# Patient Record
Sex: FEMALE | Race: WHITE | NOT HISPANIC OR LATINO | Employment: OTHER | URBAN - NONMETROPOLITAN AREA
[De-identification: names, ages, dates, MRNs, and addresses within clinical notes are randomized per-mention and may not be internally consistent; named-entity substitution may affect disease eponyms.]

---

## 2017-02-20 DIAGNOSIS — E03.9 HYPOTHYROIDISM: ICD-10-CM

## 2017-02-20 RX ORDER — LEVOTHYROXINE SODIUM 100 UG/1
TABLET ORAL
Qty: 90 TABLET | Refills: 1 | Status: SHIPPED | OUTPATIENT
Start: 2017-02-20 | End: 2017-08-22

## 2017-06-22 DIAGNOSIS — Z12.31 VISIT FOR SCREENING MAMMOGRAM: Primary | ICD-10-CM

## 2017-07-11 ENCOUNTER — TRANSFERRED RECORDS (OUTPATIENT)
Dept: HEALTH INFORMATION MANAGEMENT | Facility: HOSPITAL | Age: 67
End: 2017-07-11

## 2017-08-01 ENCOUNTER — OFFICE VISIT (OUTPATIENT)
Dept: FAMILY MEDICINE | Facility: OTHER | Age: 67
End: 2017-08-01
Attending: FAMILY MEDICINE
Payer: MEDICARE

## 2017-08-01 VITALS
RESPIRATION RATE: 16 BRPM | DIASTOLIC BLOOD PRESSURE: 80 MMHG | WEIGHT: 200 LBS | SYSTOLIC BLOOD PRESSURE: 128 MMHG | HEIGHT: 65 IN | OXYGEN SATURATION: 98 % | HEART RATE: 64 BPM | TEMPERATURE: 97.2 F | BODY MASS INDEX: 33.32 KG/M2

## 2017-08-01 DIAGNOSIS — E11.9 TYPE 2 DIABETES MELLITUS WITHOUT COMPLICATION, WITHOUT LONG-TERM CURRENT USE OF INSULIN (H): Primary | ICD-10-CM

## 2017-08-01 DIAGNOSIS — Z11.59 NEED FOR HEPATITIS C SCREENING TEST: ICD-10-CM

## 2017-08-01 DIAGNOSIS — I10 ESSENTIAL HYPERTENSION: ICD-10-CM

## 2017-08-01 DIAGNOSIS — Z78.0 POSTMENOPAUSAL STATUS: ICD-10-CM

## 2017-08-01 DIAGNOSIS — K59.00 CONSTIPATION, UNSPECIFIED CONSTIPATION TYPE: ICD-10-CM

## 2017-08-01 DIAGNOSIS — E03.9 HYPOTHYROIDISM, UNSPECIFIED TYPE: ICD-10-CM

## 2017-08-01 LAB
ANION GAP SERPL CALCULATED.3IONS-SCNC: 5 MMOL/L (ref 3–14)
BUN SERPL-MCNC: 17 MG/DL (ref 7–30)
CALCIUM SERPL-MCNC: 9.7 MG/DL (ref 8.5–10.1)
CHLORIDE SERPL-SCNC: 104 MMOL/L (ref 94–109)
CHOLEST SERPL-MCNC: 188 MG/DL
CO2 SERPL-SCNC: 29 MMOL/L (ref 20–32)
CREAT SERPL-MCNC: 0.76 MG/DL (ref 0.52–1.04)
CREAT UR-MCNC: 110 MG/DL
EST. AVERAGE GLUCOSE BLD GHB EST-MCNC: 148 MG/DL
GFR SERPL CREATININE-BSD FRML MDRD: 76 ML/MIN/1.7M2
GLUCOSE SERPL-MCNC: 127 MG/DL (ref 70–99)
HBA1C MFR BLD: 6.8 % (ref 4.3–6)
HDLC SERPL-MCNC: 33 MG/DL
LDLC SERPL CALC-MCNC: 108 MG/DL
MICROALBUMIN UR-MCNC: 7 MG/L
MICROALBUMIN/CREAT UR: 6.36 MG/G CR (ref 0–25)
NONHDLC SERPL-MCNC: 155 MG/DL
POTASSIUM SERPL-SCNC: 4.1 MMOL/L (ref 3.4–5.3)
SODIUM SERPL-SCNC: 138 MMOL/L (ref 133–144)
TRIGL SERPL-MCNC: 234 MG/DL
TSH SERPL DL<=0.05 MIU/L-ACNC: 0.3 MU/L (ref 0.4–4)

## 2017-08-01 PROCEDURE — 82043 UR ALBUMIN QUANTITATIVE: CPT | Mod: ZL | Performed by: FAMILY MEDICINE

## 2017-08-01 PROCEDURE — 36415 COLL VENOUS BLD VENIPUNCTURE: CPT | Mod: ZL | Performed by: FAMILY MEDICINE

## 2017-08-01 PROCEDURE — 80048 BASIC METABOLIC PNL TOTAL CA: CPT | Mod: ZL | Performed by: FAMILY MEDICINE

## 2017-08-01 PROCEDURE — G0009 ADMIN PNEUMOCOCCAL VACCINE: HCPCS | Performed by: FAMILY MEDICINE

## 2017-08-01 PROCEDURE — G0472 HEP C SCREEN HIGH RISK/OTHER: HCPCS | Mod: ZL | Performed by: FAMILY MEDICINE

## 2017-08-01 PROCEDURE — 40000788 ZZHCL STATISTIC ESTIMATED AVERAGE GLUCOSE: Mod: ZL | Performed by: FAMILY MEDICINE

## 2017-08-01 PROCEDURE — 80061 LIPID PANEL: CPT | Mod: ZL | Performed by: FAMILY MEDICINE

## 2017-08-01 PROCEDURE — 83036 HEMOGLOBIN GLYCOSYLATED A1C: CPT | Mod: ZL | Performed by: FAMILY MEDICINE

## 2017-08-01 PROCEDURE — 90732 PPSV23 VACC 2 YRS+ SUBQ/IM: CPT | Performed by: FAMILY MEDICINE

## 2017-08-01 PROCEDURE — 99214 OFFICE O/P EST MOD 30 MIN: CPT | Performed by: FAMILY MEDICINE

## 2017-08-01 PROCEDURE — 84443 ASSAY THYROID STIM HORMONE: CPT | Mod: ZL | Performed by: FAMILY MEDICINE

## 2017-08-01 PROCEDURE — 99212 OFFICE O/P EST SF 10 MIN: CPT | Mod: 25

## 2017-08-01 RX ORDER — POLYETHYLENE GLYCOL 3350 17 G/17G
1 POWDER, FOR SOLUTION ORAL DAILY
Qty: 510 G | Refills: 3 | Status: SHIPPED | OUTPATIENT
Start: 2017-08-01 | End: 2018-02-19

## 2017-08-01 ASSESSMENT — ANXIETY QUESTIONNAIRES
6. BECOMING EASILY ANNOYED OR IRRITABLE: MORE THAN HALF THE DAYS
5. BEING SO RESTLESS THAT IT IS HARD TO SIT STILL: NOT AT ALL
3. WORRYING TOO MUCH ABOUT DIFFERENT THINGS: NOT AT ALL
1. FEELING NERVOUS, ANXIOUS, OR ON EDGE: SEVERAL DAYS
4. TROUBLE RELAXING: NOT AT ALL
7. FEELING AFRAID AS IF SOMETHING AWFUL MIGHT HAPPEN: NOT AT ALL
IF YOU CHECKED OFF ANY PROBLEMS ON THIS QUESTIONNAIRE, HOW DIFFICULT HAVE THESE PROBLEMS MADE IT FOR YOU TO DO YOUR WORK, TAKE CARE OF THINGS AT HOME, OR GET ALONG WITH OTHER PEOPLE: NOT DIFFICULT AT ALL
GAD7 TOTAL SCORE: 3
2. NOT BEING ABLE TO STOP OR CONTROL WORRYING: NOT AT ALL

## 2017-08-01 NOTE — MR AVS SNAPSHOT
After Visit Summary   8/1/2017    Kayla Lugo    MRN: [id number]           Patient Information     Date Of Birth          1950        Visit Information        Provider Department      8/1/2017 9:00 AM Natalia Starr MD [facility]        Today's Diagnoses     Type 2 diabetes mellitus without complication, without long-term current use of insulin (H)    -  1    Essential hypertension        Hypothyroidism, unspecified type        Need for hepatitis C screening test        Postmenopausal status        Constipation, unspecified constipation type           Follow-ups after your visit        Follow-up notes from your care team     Return in about 6 months (around 2/1/2018).      Who to contact     If you have questions or need follow up information about today's clinic visit or your schedule please contact [facility] directly at [phone].  Normal or non-critical lab and imaging results will be communicated to you by MyChart, letter or phone within 4 business days after the clinic has received the results. If you do not hear from us within 7 days, please contact the clinic through [employer] or phone. If you have a critical or abnormal lab result, we will notify you by phone as soon as possible.  Submit refill requests through [employer] or call your pharmacy and they will forward the refill request to us. Please allow 3 business days for your refill to be completed.          Additional Information About Your Visit        MyChart Information     [employer] gives you secure access to your electronic health record. If you see a primary care provider, you can also send messages to your care team and make appointments. If you have questions, please call your primary care clinic.  If you do not have a primary care provider, please call [phone] and they will assist you.        Care EveryWhere ID     This is your Care EveryWhere ID. This could be used by other organizations  "to access your [facility] medical records  [id number]        Your Vitals Were     Pulse Temperature Respirations Height Pulse Oximetry BMI (Body Mass Index)    64 97.2  F (36.2  C) (Tympanic) 16 5' 4.75\" (1.645 m) 98% 33.54 kg/m2       Blood Pressure from Last 3 Encounters:   08/01/17 128/80   07/29/16 134/78   12/01/15 135/80    Weight from Last 3 Encounters:   08/01/17 200 lb (90.7 kg)   07/29/16 200 lb (90.7 kg)   12/01/15 211 lb 1.6 oz (95.8 kg)              We Performed the Following     Albumin Random Urine Quantitative     Basic metabolic panel     C FOOT EXAM  NO CHARGE     HEMOGLOBIN A1C     Hepatitis C Screen Reflex to HCV RNA Quant and Genotype     Lipid Profile     TSH     VACCINE ADMINISTRATION, INITIAL          Today's Medication Changes          These changes are accurate as of: 8/1/17 10:24 AM.  If you have any questions, ask your nurse or doctor.               Start taking these medicines.        Dose/Directions    polyethylene glycol powder   Commonly known as:  MIRALAX   Used for:  Constipation, unspecified constipation type   Started by:  Natalia Starr MD        Dose:  1 capful   Take 17 g (1 capful) by mouth daily   Quantity:  510 g   Refills:  3            Where to get your medicines      These medications were sent to [facility] [address] [place] [address]  AT [facility] [address]  [address] [place] [address]     Phone:  [phone]     polyethylene glycol powder                Primary Care Provider Office Phone # Fax #    Natalia Starr [phone]       [facility] [address] [place] [address]        Equal Access to Services     HUMPHREY ANTUNEZ AH: Hadii aad ku hadasho Soomaali, waaxda luqadaha, qaybta kaalmada adeegyada, susana kennedyn trevor renner. So [facility] [phone].    ATENCIÓN: Si habla español, tiene a cordero disposición servicios gratuitos de asistencia lingüística. Llame al " [phone].    We comply with applicable federal civil rights laws and [place] laws. We do not discriminate on the basis of race, color, national origin, age, disability sex, sexual orientation or gender identity.            Thank you!     Thank you for choosing [facility]  for your care. Our goal is always to provide you with excellent care. Hearing back from our patients is one way we can continue to improve our services. Please take a few minutes to complete the written survey that you may receive in the mail after your visit with us. Thank you!             Your Updated Medication List - Protect others around you: Learn how to safely use, store and throw away your medicines at www.disposemymeds.org.          This list is accurate as of: 8/1/17 10:24 AM.  Always use your most recent med list.                   Brand Name Dispense Instructions for use Diagnosis    ACCU-CHEK SMARTVIEW test strip   Generic drug:  blood glucose monitoring     100 each    use to test 3 times daily or as directed    Diabetes mellitus, type 2 (H)       blood glucose monitoring lancets     102 Box    use to test  blood sugar 3 times daily or as directed    Diabetes mellitus, type 2 (H)       cholecalciferol 1000 UNIT tablet    vitamin D     Take 4 tablets by mouth daily.        fish oil-omega-3 fatty acids 1000 MG capsule      Take 3 capsules by mouth daily.        ibuprofen 600 MG tablet    ADVIL/MOTRIN    90 tablet    Take 1 tablet (600 mg) by mouth every 6 hours as needed for moderate pain    Back pain       levothyroxine 100 MCG tablet    SYNTHROID/LEVOTHROID    90 tablet    TAKE 1 TABLET BY MOUTH EVERY DAY    Hypothyroidism       polyethylene glycol powder    MIRALAX    510 g    Take 17 g (1 capful) by mouth daily    Constipation, unspecified constipation type

## 2017-08-01 NOTE — PROGRESS NOTES
SUBJECTIVE:                                                    Kayla Lugo is a 67 year old female who presents to clinic today for the following health issues:    Diabetes Follow-up      Patient is checking blood sugars: not at all    Diabetic concerns: None     Symptoms of hypoglycemia (low blood sugar): no lows     Paresthesias (numbness or burning in feet) or sores: No     Date of last diabetic eye exam: 11/2016    Hypertension Follow-up      Outpatient blood pressures are not being checked.    Low Salt Diet: no added salt    Hypothyroidism Follow-up      Since last visit, patient describes the following symptoms: Weight stable, no hair loss, no skin changes, no constipation, no loose stools    Patient is due for Hepatitis C screening.  She is also due for DEXA scan.  She would like refill of Miralax, originally prescribed by GI.      Problem list and histories reviewed & adjusted, as indicated.  Additional history: as documented    Patient Active Problem List   Diagnosis     Essential hypertension     Hypothyroidism     Esophageal reflux     Osteoarthrosis, unspecified whether generalized or localized, involving lower leg     Eczema     Hearing loss     Advanced care planning/counseling discussion     Skin lesion of face     Basal cell carcinoma of upper lip     S/P excision of skin lesion, follow-up exam     Diabetes mellitus, type 2 (H)     History of labyrinthitis     Past Surgical History:   Procedure Laterality Date     ARTHROSCOPY KNEE  2001    RT      COLONOSCOPY  2004     COLONOSCOPY  6/13/12    Dr. Gorman; 5 year recall given     COLONOSCOPY  07/11/2017     colonoscopy with polypectomy  2007    repeat in five      DILATION AND CURETTAGE  1996     EXCISE LESION LIP Right 9/16/2014    Procedure: EXCISE LESION LIP;  Surgeon: Bri Flores MD;  Location: [facility]     ORTHOPEDIC SURGERY Right 10-5-2015    right shoulder arthoscopy [facility]     NADIRA with BSO  1997       Social History   Substance Use  "Topics     Smoking status: Never Smoker     Smokeless tobacco: Never Used     Alcohol use No     Family History   Problem Relation Age of Onset     Alcohol/Drug Father      alcoholism     C.A.D. Father      (cause of death)      CANCER Mother      basal cell     Ovarian Cancer Mother      Alcohol/Drug Son      alcoholism      C.A.D. Son      DIABETES Brother      DIABETES Maternal Grandmother          Current Outpatient Prescriptions   Medication Sig Dispense Refill     polyethylene glycol (MIRALAX) powder Take 17 g (1 capful) by mouth daily 510 g 3     levothyroxine (SYNTHROID/LEVOTHROID) 100 MCG tablet TAKE 1 TABLET BY MOUTH EVERY DAY 90 tablet 1     ACCU-CHEK SMARTVIEW test strip use to test 3 times daily or as directed (Patient not taking: Reported on 8/1/2017) 100 each 10     blood glucose monitoring (ACCU-CHEK FASTCLIX) lancets use to test  blood sugar 3 times daily or as directed (Patient not taking: Reported on 8/1/2017) 102 Box 10     ibuprofen (ADVIL,MOTRIN) 600 MG tablet Take 1 tablet (600 mg) by mouth every 6 hours as needed for moderate pain (Patient not taking: Reported on 8/1/2017) 90 tablet 5     cholecalciferol (VITAMIN D3) 1000 UNIT tablet Take 4 tablets by mouth daily.       fish oil-omega-3 fatty acids (FISH OIL) 1000 MG capsule Take 3 capsules by mouth daily.       Allergies   Allergen Reactions     Hydrochlorothiazide Rash     Atenolol Other (See Comments)     Bradycardia      Lisinopril Cough         Reviewed and updated as needed this visit by clinical staffTobacco  Allergies  Meds  Problems  Med Hx  Surg Hx  Fam Hx  Soc Hx        Reviewed and updated as needed this visit by Provider         ROS:  Constitutional, HEENT, cardiovascular, pulmonary, gi and gu systems are negative, except as otherwise noted.      OBJECTIVE:   /80 (BP Location: Left arm, Patient Position: Sitting, Cuff Size: Adult Regular)  Pulse 64  Temp 97.2  F (36.2  C) (Tympanic)  Resp 16  Ht 5' 4.75\" (1.645 " m)  Wt 200 lb (90.7 kg)  SpO2 98%  BMI 33.54 kg/m2  Body mass index is 33.54 kg/(m^2).  GENERAL: healthy, alert and no distress  EYES: Eyes grossly normal to inspection, PERRL and conjunctivae and sclerae normal  HENT: ear canals and TM's normal, nose and mouth without ulcers or lesions  NECK: no adenopathy  RESP: lungs clear to auscultation - no rales, rhonchi or wheezes  BREAST: normal without masses, tenderness or nipple discharge and no palpable axillary masses or adenopathy  CV: regular rate and rhythm, normal S1 S2, no S3 or S4, no murmur, click or rub, no peripheral edema and peripheral pulses strong  ABDOMEN: soft, nontender, no hepatosplenomegaly, no masses and bowel sounds normal  MS: no gross musculoskeletal defects noted, no edema  SKIN: no suspicious lesions or rashes  NEURO: Normal strength and tone, mentation intact and speech normal  PSYCH: mentation appears normal, affect normal/bright    Diagnostic Test Results:  none     ASSESSMENT/PLAN:     Diabetes Type II, A1c Controlled, non-insulin dependent   Associated with the following complications    Renal Complications:  None    Ophthalmologic Complications: None    Neurologic Complications: None    Peripheral Vascular Complications:  None    Other: None   Plan:  Labs:  See orders    Hypertension; controlled   Associated with the following complications:    Diabetes   Plan:  Labs:   See orders    Hypothyroidism; controlled/euthyroid   Plan:  Labs:  See orders              ICD-10-CM    1. Type 2 diabetes mellitus without complication, without long-term current use of insulin (H) E11.9 C FOOT EXAM  NO CHARGE     VACCINE ADMINISTRATION, INITIAL     HEMOGLOBIN A1C     Lipid Profile     Albumin Random Urine Quantitative   2. Essential hypertension I10 Basic metabolic panel   3. Hypothyroidism, unspecified type E03.9 TSH   4. Need for hepatitis C screening test Z11.59 Hepatitis C Screen Reflex to HCV RNA Quant and Genotype   5. Postmenopausal status Z78.0  DX Hip/Pelvis/Spine     DX Hip/Pelvis/Spine   6. Constipation, unspecified constipation type K59.00 polyethylene glycol (MIRALAX) powder       FUTURE APPOINTMENTS:       - Follow-up visit in 6 months, sooner as needed.      Natalia Starr MD  [facility]

## 2017-08-01 NOTE — NURSING NOTE
"Chief Complaint   Patient presents with     Physical      Due for breast exam, and questions on her recent colonoscopy     Thyroid Problem     lab results and medication refills     Diabetes     borderline diabetic, due for labwork and foot exam       Initial /80 (BP Location: Left arm, Patient Position: Sitting, Cuff Size: Adult Regular)  Pulse 64  Temp 97.2  F (36.2  C) (Tympanic)  Resp 16  Ht 5' 4.75\" (1.645 m)  Wt 200 lb (90.7 kg)  SpO2 98%  BMI 33.54 kg/m2 Estimated body mass index is 33.54 kg/(m^2) as calculated from the following:    Height as of this encounter: 5' 4.75\" (1.645 m).    Weight as of this encounter: 200 lb (90.7 kg).  Medication Reconciliation: complete     Anamaria Grover      "

## 2017-08-02 LAB — HCV AB SERPL QL IA: NORMAL

## 2017-08-02 ASSESSMENT — PATIENT HEALTH QUESTIONNAIRE - PHQ9: SUM OF ALL RESPONSES TO PHQ QUESTIONS 1-9: 0

## 2017-08-02 ASSESSMENT — ANXIETY QUESTIONNAIRES: GAD7 TOTAL SCORE: 3

## 2017-08-04 DIAGNOSIS — E78.5 HYPERLIPIDEMIA, UNSPECIFIED HYPERLIPIDEMIA TYPE: ICD-10-CM

## 2017-08-04 DIAGNOSIS — E11.9 TYPE 2 DIABETES MELLITUS WITHOUT COMPLICATION, WITHOUT LONG-TERM CURRENT USE OF INSULIN (H): Primary | ICD-10-CM

## 2017-08-04 RX ORDER — ATORVASTATIN CALCIUM 10 MG/1
10 TABLET, FILM COATED ORAL DAILY
Qty: 30 TABLET | Refills: 2 | Status: SHIPPED | OUTPATIENT
Start: 2017-08-04 | End: 2017-11-09

## 2017-08-04 RX ORDER — METFORMIN HCL 500 MG
500 TABLET, EXTENDED RELEASE 24 HR ORAL
Qty: 30 TABLET | Refills: 2 | Status: SHIPPED | OUTPATIENT
Start: 2017-08-04 | End: 2017-11-09

## 2017-08-10 ENCOUNTER — HOSPITAL ENCOUNTER (OUTPATIENT)
Dept: GENERAL RADIOLOGY | Facility: HOSPITAL | Age: 67
Discharge: HOME | End: 2017-08-10
Attending: FAMILY MEDICINE | Admitting: FAMILY MEDICINE
Payer: MEDICARE

## 2017-08-10 PROCEDURE — 77080 DXA BONE DENSITY AXIAL: CPT | Mod: TC

## 2017-08-22 ENCOUNTER — MYC MEDICAL ADVICE (OUTPATIENT)
Dept: FAMILY MEDICINE | Facility: OTHER | Age: 67
End: 2017-08-22

## 2017-08-22 ENCOUNTER — TELEPHONE (OUTPATIENT)
Dept: FAMILY MEDICINE | Facility: OTHER | Age: 67
End: 2017-08-22

## 2017-08-22 DIAGNOSIS — E03.9 HYPOTHYROIDISM, UNSPECIFIED TYPE: ICD-10-CM

## 2017-08-22 RX ORDER — LEVOTHYROXINE SODIUM 100 UG/1
100 TABLET ORAL DAILY
Qty: 90 TABLET | Refills: 0 | Status: SHIPPED | OUTPATIENT
Start: 2017-08-22 | End: 2017-11-17

## 2017-08-22 NOTE — TELEPHONE ENCOUNTER
Reason for call:  Medication    1. Medication Name? Synthroid  2. Is this request for a refill?  Yes  3. What Pharmacy do you use?  [facility]  4. Have you contacted your pharmacy?  Yes  5. If yes, when?  (Please note that the turn-around-time for prescriptions is 72 business hours; I am sending your request at this time. SEND TO  [facility]  )  Description:    Kayla is completely out of this medication  Was an appointment offered for this a call?  No  Preferred method for responding to this message   [phone]  If we cannot reach you directly, may we leave a detailed response at the number you provided? Yes

## 2017-10-18 ENCOUNTER — ALLIED HEALTH/NURSE VISIT (OUTPATIENT)
Dept: FAMILY MEDICINE | Facility: OTHER | Age: 67
End: 2017-10-18
Attending: FAMILY MEDICINE
Payer: MEDICARE

## 2017-10-18 DIAGNOSIS — Z23 NEED FOR PROPHYLACTIC VACCINATION AND INOCULATION AGAINST INFLUENZA: Primary | ICD-10-CM

## 2017-10-18 PROCEDURE — 90662 IIV NO PRSV INCREASED AG IM: CPT

## 2017-10-18 PROCEDURE — G0008 ADMIN INFLUENZA VIRUS VAC: HCPCS

## 2017-10-18 NOTE — PROGRESS NOTES
Injectable Influenza Immunization Documentation    1.  Is the person to be vaccinated sick today?   No    2. Does the person to be vaccinated have an allergy to a component   of the vaccine?   No    3. Has the person to be vaccinated ever had a serious reaction   to influenza vaccine in the past?   No    4. Has the person to be vaccinated ever had Guillain-Barré syndrome?   No    Form completed by Kavya Dyson

## 2017-11-07 ENCOUNTER — TRANSFERRED RECORDS (OUTPATIENT)
Dept: HEALTH INFORMATION MANAGEMENT | Facility: HOSPITAL | Age: 67
End: 2017-11-07

## 2017-11-09 DIAGNOSIS — E11.9 TYPE 2 DIABETES MELLITUS WITHOUT COMPLICATION, WITHOUT LONG-TERM CURRENT USE OF INSULIN (H): ICD-10-CM

## 2017-11-09 DIAGNOSIS — E78.5 HYPERLIPIDEMIA, UNSPECIFIED HYPERLIPIDEMIA TYPE: ICD-10-CM

## 2017-11-10 DIAGNOSIS — E11.9 TYPE 2 DIABETES MELLITUS WITHOUT COMPLICATION, WITHOUT LONG-TERM CURRENT USE OF INSULIN (H): ICD-10-CM

## 2017-11-10 RX ORDER — ATORVASTATIN CALCIUM 10 MG/1
TABLET, FILM COATED ORAL
Qty: 30 TABLET | Refills: 0 | Status: SHIPPED | OUTPATIENT
Start: 2017-11-10 | End: 2017-11-20

## 2017-11-10 RX ORDER — METFORMIN HCL 500 MG
TABLET, EXTENDED RELEASE 24 HR ORAL
Qty: 30 TABLET | Refills: 0 | Status: SHIPPED | OUTPATIENT
Start: 2017-11-10 | End: 2017-11-10

## 2017-11-10 RX ORDER — METFORMIN HCL 500 MG
TABLET, EXTENDED RELEASE 24 HR ORAL
Qty: 30 TABLET | Refills: 0 | Status: SHIPPED | OUTPATIENT
Start: 2017-11-10 | End: 2017-11-20

## 2017-11-13 NOTE — TELEPHONE ENCOUNTER
Metformin      Last Written Prescription Date: 11/10/17  Last Fill Quantity: 30,  # refills: 0   Last Office Visit with G, P or [facility] prescribing provider: 8/1/17

## 2017-11-14 RX ORDER — METFORMIN HCL 500 MG
TABLET, EXTENDED RELEASE 24 HR ORAL
Qty: 90 TABLET | Refills: 1 | Status: SHIPPED | OUTPATIENT
Start: 2017-11-14 | End: 2018-05-21

## 2017-11-17 DIAGNOSIS — E03.9 HYPOTHYROIDISM, UNSPECIFIED TYPE: ICD-10-CM

## 2017-11-17 RX ORDER — LEVOTHYROXINE SODIUM 100 UG/1
TABLET ORAL
Qty: 90 TABLET | Refills: 0 | Status: SHIPPED | OUTPATIENT
Start: 2017-11-17 | End: 2018-02-19

## 2017-11-17 NOTE — TELEPHONE ENCOUNTER
LOV and lab was done 8/2017.    TSH was 0.3 with no recommended change to his dose.  She was to fu in 3 months, and this has been scheduled by the patient.  Synthroid was therefore refilled.  Angle Paiz

## 2017-11-20 ENCOUNTER — OFFICE VISIT (OUTPATIENT)
Dept: FAMILY MEDICINE | Facility: OTHER | Age: 67
End: 2017-11-20
Attending: FAMILY MEDICINE
Payer: MEDICARE

## 2017-11-20 VITALS
TEMPERATURE: 97.9 F | HEART RATE: 70 BPM | HEIGHT: 65 IN | SYSTOLIC BLOOD PRESSURE: 140 MMHG | OXYGEN SATURATION: 98 % | BODY MASS INDEX: 31.99 KG/M2 | DIASTOLIC BLOOD PRESSURE: 86 MMHG | WEIGHT: 192 LBS | RESPIRATION RATE: 15 BRPM

## 2017-11-20 DIAGNOSIS — E11.9 TYPE 2 DIABETES MELLITUS WITHOUT COMPLICATION, WITHOUT LONG-TERM CURRENT USE OF INSULIN (H): Primary | ICD-10-CM

## 2017-11-20 DIAGNOSIS — I10 ESSENTIAL HYPERTENSION: ICD-10-CM

## 2017-11-20 DIAGNOSIS — E78.5 HYPERLIPIDEMIA, UNSPECIFIED HYPERLIPIDEMIA TYPE: ICD-10-CM

## 2017-11-20 LAB
ALT SERPL W P-5'-P-CCNC: 26 U/L (ref 0–50)
ANION GAP SERPL CALCULATED.3IONS-SCNC: 10 MMOL/L (ref 3–14)
BUN SERPL-MCNC: 12 MG/DL (ref 7–30)
CALCIUM SERPL-MCNC: 9.4 MG/DL (ref 8.5–10.1)
CHLORIDE SERPL-SCNC: 103 MMOL/L (ref 94–109)
CHOLEST SERPL-MCNC: 135 MG/DL
CO2 SERPL-SCNC: 26 MMOL/L (ref 20–32)
CREAT SERPL-MCNC: 0.77 MG/DL (ref 0.52–1.04)
EST. AVERAGE GLUCOSE BLD GHB EST-MCNC: 134 MG/DL
GFR SERPL CREATININE-BSD FRML MDRD: 75 ML/MIN/1.7M2
GLUCOSE SERPL-MCNC: 99 MG/DL (ref 70–99)
HBA1C MFR BLD: 6.3 % (ref 4.3–6)
HDLC SERPL-MCNC: 35 MG/DL
LDLC SERPL CALC-MCNC: 56 MG/DL
NONHDLC SERPL-MCNC: 100 MG/DL
POTASSIUM SERPL-SCNC: 4.4 MMOL/L (ref 3.4–5.3)
SODIUM SERPL-SCNC: 139 MMOL/L (ref 133–144)
TRIGL SERPL-MCNC: 220 MG/DL

## 2017-11-20 PROCEDURE — 80048 BASIC METABOLIC PNL TOTAL CA: CPT | Mod: ZL | Performed by: FAMILY MEDICINE

## 2017-11-20 PROCEDURE — 80061 LIPID PANEL: CPT | Mod: ZL | Performed by: FAMILY MEDICINE

## 2017-11-20 PROCEDURE — 99212 OFFICE O/P EST SF 10 MIN: CPT

## 2017-11-20 PROCEDURE — 99214 OFFICE O/P EST MOD 30 MIN: CPT | Performed by: FAMILY MEDICINE

## 2017-11-20 PROCEDURE — 83036 HEMOGLOBIN GLYCOSYLATED A1C: CPT | Mod: ZL | Performed by: FAMILY MEDICINE

## 2017-11-20 PROCEDURE — 40000788 ZZHCL STATISTIC ESTIMATED AVERAGE GLUCOSE: Mod: ZL | Performed by: FAMILY MEDICINE

## 2017-11-20 PROCEDURE — 84460 ALANINE AMINO (ALT) (SGPT): CPT | Mod: ZL | Performed by: FAMILY MEDICINE

## 2017-11-20 PROCEDURE — 36415 COLL VENOUS BLD VENIPUNCTURE: CPT | Mod: ZL | Performed by: FAMILY MEDICINE

## 2017-11-20 RX ORDER — ATORVASTATIN CALCIUM 10 MG/1
10 TABLET, FILM COATED ORAL DAILY
Qty: 90 TABLET | Refills: 3 | Status: SHIPPED | OUTPATIENT
Start: 2017-11-20 | End: 2018-11-12

## 2017-11-20 ASSESSMENT — PATIENT HEALTH QUESTIONNAIRE - PHQ9: SUM OF ALL RESPONSES TO PHQ QUESTIONS 1-9: 1

## 2017-11-20 ASSESSMENT — ANXIETY QUESTIONNAIRES
3. WORRYING TOO MUCH ABOUT DIFFERENT THINGS: NOT AT ALL
5. BEING SO RESTLESS THAT IT IS HARD TO SIT STILL: NOT AT ALL
7. FEELING AFRAID AS IF SOMETHING AWFUL MIGHT HAPPEN: NOT AT ALL
IF YOU CHECKED OFF ANY PROBLEMS ON THIS QUESTIONNAIRE, HOW DIFFICULT HAVE THESE PROBLEMS MADE IT FOR YOU TO DO YOUR WORK, TAKE CARE OF THINGS AT HOME, OR GET ALONG WITH OTHER PEOPLE: NOT DIFFICULT AT ALL
1. FEELING NERVOUS, ANXIOUS, OR ON EDGE: NOT AT ALL
4. TROUBLE RELAXING: NOT AT ALL
GAD7 TOTAL SCORE: 1
6. BECOMING EASILY ANNOYED OR IRRITABLE: SEVERAL DAYS
2. NOT BEING ABLE TO STOP OR CONTROL WORRYING: NOT AT ALL

## 2017-11-20 NOTE — NURSING NOTE
"Chief Complaint   Patient presents with     Recheck Medication     Lipids     Diabetes       Initial /86 (BP Location: Left arm, Patient Position: Sitting, Cuff Size: Adult Regular)  Pulse 70  Temp 97.9  F (36.6  C) (Tympanic)  Resp 15  Ht 5' 5\" (1.651 m)  Wt 192 lb (87.1 kg)  SpO2 98%  BMI 31.95 kg/m2 Estimated body mass index is 31.95 kg/(m^2) as calculated from the following:    Height as of this encounter: 5' 5\" (1.651 m).    Weight as of this encounter: 192 lb (87.1 kg).  Medication Reconciliation: complete    "

## 2017-11-20 NOTE — MR AVS SNAPSHOT
After Visit Summary   11/20/2017    Kayla Lugo    MRN: [id number]           Patient Information     Date Of Birth          1950        Visit Information        Provider Department      11/20/2017 10:15 AM Natalia Starr MD [facility]        Today's Diagnoses     Type 2 diabetes mellitus without complication, without long-term current use of insulin (H)    -  1    Hyperlipidemia, unspecified hyperlipidemia type        Essential hypertension           Follow-ups after your visit        Follow-up notes from your care team     Return in about 6 months (around 5/20/2018).      Who to contact     If you have questions or need follow up information about today's clinic visit or your schedule please contact [facility] directly at [phone].  Normal or non-critical lab and imaging results will be communicated to you by [employer] [employer], letter or phone within 4 business days after the clinic has received the results. If you do not hear from us within 7 days, please contact the clinic through [employer] or phone. If you have a critical or abnormal lab result, we will notify you by phone as soon as possible.  Submit refill requests through [employer] or call your pharmacy and they will forward the refill request to us. Please allow 3 business days for your refill to be completed.          Additional Information About Your Visit        MyChart Information     [employer] gives you secure access to your electronic health record. If you see a primary care provider, you can also send messages to your care team and make appointments. If you have questions, please call your primary care clinic.  If you do not have a primary care provider, please call [phone] and they will assist you.        Care EveryWhere ID     This is your Care EveryWhere ID. This could be used by other organizations to access your [facility] medical records  [id number]        Your Vitals Were     Pulse Temperature  "Respirations Height Pulse Oximetry BMI (Body Mass Index)    70 97.9  F (36.6  C) (Tympanic) 15 5' 5\" (1.651 m) 98% 31.95 kg/m2       Blood Pressure from Last 3 Encounters:   11/20/17 140/86   08/01/17 128/80   07/29/16 134/78    Weight from Last 3 Encounters:   11/20/17 192 lb (87.1 kg)   08/01/17 200 lb (90.7 kg)   07/29/16 200 lb (90.7 kg)              We Performed the Following     ALT     Basic metabolic panel     Hemoglobin A1c     Lipid Profile          Today's Medication Changes          These changes are accurate as of: 11/20/17  1:01 PM.  If you have any questions, ask your nurse or doctor.               These medicines have changed or have updated prescriptions.        Dose/Directions    atorvastatin 10 MG tablet   Commonly known as:  LIPITOR   This may have changed:  See the new instructions.   Used for:  Hyperlipidemia, unspecified hyperlipidemia type   Changed by:  Natalia Starr MD        Dose:  10 mg   Take 1 tablet (10 mg) by mouth daily   Quantity:  90 tablet   Refills:  3       metFORMIN 500 MG 24 hr tablet   Commonly known as:  GLUCOPHAGE-XR   This may have changed:  Another medication with the same name was removed. Continue taking this medication, and follow the directions you see here.   Used for:  Type 2 diabetes mellitus without complication, without long-term current use of insulin (H)   Changed by:  Natalia Starr MD        TAKE 1 TABLET(500 MG) BY MOUTH DAILY WITH DINNER   Quantity:  90 tablet   Refills:  1            Where to get your medicines      These medications were sent to [facility] [place], [place] [address] AT [facility] [address] [address]     Phone:  [phone]     atorvastatin 10 MG tablet                Primary Care Provider Office Phone # Fax #    Natalia Starr [phone] [address] [facility] [address]        Equal Access to Services     ALYSSA ANTUNEZ " AH: Megan mosesnickayde Maryellen, waaxda luqadaha, qaybta kakojo weiss, susana aria marciasowmya fournieryobalwinder renner. So [facility] [phone].    ATENCIÓN: Si habla español, tiene a cordero disposición servicios gratuitos de asistencia lingüística. Llame al [phone].    We comply with applicable federal civil rights laws and [place] laws. We do not discriminate on the basis of race, color, national origin, age, disability, sex, sexual orientation, or gender identity.            Thank you!     Thank you for choosing [facility]  for your care. Our goal is always to provide you with excellent care. Hearing back from our patients is one way we can continue to improve our services. Please take a few minutes to complete the written survey that you may receive in the mail after your visit with us. Thank you!             Your Updated Medication List - Protect others around you: Learn how to safely use, store and throw away your medicines at www.disposemymeds.org.          This list is accurate as of: 11/20/17  1:01 PM.  Always use your most recent med list.                   Brand Name Dispense Instructions for use Diagnosis    ACCU-CHEK SMARTVIEW test strip   Generic drug:  blood glucose monitoring     100 each    use to test 3 times daily or as directed    Diabetes mellitus, type 2 (H)       atorvastatin 10 MG tablet    LIPITOR    90 tablet    Take 1 tablet (10 mg) by mouth daily    Hyperlipidemia, unspecified hyperlipidemia type       blood glucose monitoring lancets     102 Box    use to test  blood sugar 3 times daily or as directed    Diabetes mellitus, type 2 (H)       cholecalciferol 1000 UNIT tablet    vitamin D3     Take 4 tablets by mouth daily.        fish oil-omega-3 fatty acids 1000 MG capsule      Take 3 capsules by mouth daily.        ibuprofen 600 MG tablet    ADVIL/MOTRIN    90 tablet    Take 1 tablet (600 mg) by mouth every 6 hours as needed for moderate pain    Back pain        levothyroxine 100 MCG tablet    SYNTHROID/LEVOTHROID    90 tablet    TAKE 1 TABLET(100 MCG) BY MOUTH DAILY    Hypothyroidism, unspecified type       metFORMIN 500 MG 24 hr tablet    GLUCOPHAGE-XR    90 tablet    TAKE 1 TABLET(500 MG) BY MOUTH DAILY WITH DINNER    Type 2 diabetes mellitus without complication, without long-term current use of insulin (H)       polyethylene glycol powder    MIRALAX    510 g    Take 17 g (1 capful) by mouth daily    Constipation, unspecified constipation type

## 2017-11-20 NOTE — PROGRESS NOTES
SUBJECTIVE:   Kayla Lugo is a 67 year old female who presents to clinic today for the following health issues:      Diabetes Follow-up      Patient is checking blood sugars: rarely.  Results range from 85 to 129  Morning readings    Diabetic concerns: None     Symptoms of hypoglycemia (low blood sugar): none     Paresthesias (numbness or burning in feet) or sores: No     Date of last diabetic eye exam: November 2017    Hyperlipidemia Follow-Up      Rate your low fat/cholesterol diet?: poor    Taking statin?  Yes, no muscle aches from statin    Other lipid medications/supplements?:  Fish oil/Omega 3, dose 1000 without side effects    Hypertension Follow-up      Outpatient blood pressures are not being checked.    Low Salt Diet: no added salt        Amount of exercise or physical activity: None    Problems taking medications regularly: No    Medication side effects: none  Diet: regular (no restrictions)        Problem list and histories reviewed & adjusted, as indicated.  Additional history: as documented    Patient Active Problem List   Diagnosis     Essential hypertension     Hypothyroidism     Esophageal reflux     Osteoarthrosis, unspecified whether generalized or localized, involving lower leg     Eczema     Hearing loss     Advanced care planning/counseling discussion     Skin lesion of face     Basal cell carcinoma of upper lip     S/P excision of skin lesion, follow-up exam     Diabetes mellitus, type 2 (H)     History of labyrinthitis     Hyperlipidemia, unspecified hyperlipidemia type     Past Surgical History:   Procedure Laterality Date     ARTHROSCOPY KNEE  2001    RT      COLONOSCOPY  2004     COLONOSCOPY  6/13/12    Dr. Gorman; 5 year recall given     COLONOSCOPY  07/11/2017     colonoscopy with polypectomy  2007    repeat in five      DILATION AND CURETTAGE  1996     EXCISE LESION LIP Right 9/16/2014    Procedure: EXCISE LESION LIP;  Surgeon: Bri Flores MD;  Location: [facility]      ORTHOPEDIC SURGERY Right 10-5-2015    right shoulder arthoscopy R[facility]     [facility] with BSO  1997       Social History   Substance Use Topics     Smoking status: Never Smoker     Smokeless tobacco: Never Used     Alcohol use No     Family History   Problem Relation Age of Onset     Alcohol/Drug Father      alcoholism     C.A.D. Father      (cause of death)      CANCER Mother      basal cell     Ovarian Cancer Mother      Alcohol/Drug Son      alcoholism      C.A.D. Son      DIABETES Brother      DIABETES Maternal Grandmother          Current Outpatient Prescriptions   Medication Sig Dispense Refill     atorvastatin (LIPITOR) 10 MG tablet Take 1 tablet (10 mg) by mouth daily 90 tablet 3     levothyroxine (SYNTHROID/LEVOTHROID) 100 MCG tablet TAKE 1 TABLET(100 MCG) BY MOUTH DAILY 90 tablet 0     metFORMIN (GLUCOPHAGE-XR) 500 MG 24 hr tablet TAKE 1 TABLET(500 MG) BY MOUTH DAILY WITH DINNER 90 tablet 1     polyethylene glycol (MIRALAX) powder Take 17 g (1 capful) by mouth daily 510 g 3     ACCU-CHEK SMARTVIEW test strip use to test 3 times daily or as directed 100 each 10     blood glucose monitoring (ACCU-CHEK FASTCLIX) lancets use to test  blood sugar 3 times daily or as directed 102 Box 10     ibuprofen (ADVIL,MOTRIN) 600 MG tablet Take 1 tablet (600 mg) by mouth every 6 hours as needed for moderate pain 90 tablet 5     cholecalciferol (VITAMIN D3) 1000 UNIT tablet Take 4 tablets by mouth daily.       fish oil-omega-3 fatty acids (FISH OIL) 1000 MG capsule Take 3 capsules by mouth daily.       [DISCONTINUED] atorvastatin (LIPITOR) 10 MG tablet TAKE 1 TABLET(10 MG) BY MOUTH DAILY 30 tablet 0     [DISCONTINUED] metFORMIN (GLUCOPHAGE-XR) 500 MG 24 hr tablet TAKE 1 TABLET(500 MG) BY MOUTH DAILY WITH DINNER 30 tablet 0     Allergies   Allergen Reactions     Hydrochlorothiazide Rash     Atenolol Other (See Comments)     Bradycardia      Lisinopril Cough         Reviewed and updated as needed this visit by clinical staffTobacco   "Allergies  Meds  Problems  Med Hx  Surg Hx  Fam Hx  Soc Hx        Reviewed and updated as needed this visit by Provider         ROS:  Constitutional, HEENT, cardiovascular, pulmonary, gi and gu systems are negative, except as otherwise noted.      OBJECTIVE:   /86 (BP Location: Left arm, Patient Position: Sitting, Cuff Size: Adult Regular)  Pulse 70  Temp 97.9  F (36.6  C) (Tympanic)  Resp 15  Ht 5' 5\" (1.651 m)  Wt 192 lb (87.1 kg)  SpO2 98%  BMI 31.95 kg/m2  Body mass index is 31.95 kg/(m^2).  GENERAL: healthy, alert and no distress  EYES: Eyes grossly normal to inspection, PERRL and conjunctivae and sclerae normal  HENT: ear canals and TM's normal, nose and mouth without ulcers or lesions  RESP: lungs clear to auscultation - no rales, rhonchi or wheezes  CV: regular rate and rhythm, normal S1 S2, no S3 or S4, no murmur, click or rub, no peripheral edema and peripheral pulses strong  PSYCH: mentation appears normal, affect normal/bright    Diagnostic Test Results:  none     ASSESSMENT/PLAN:     Diabetes Type II, A1c Controlled, non-insulin dependent   Associated with the following complications    Renal Complications:  None    Ophthalmologic Complications: None    Neurologic Complications: None    Peripheral Vascular Complications:  None    Other: None   Plan:  Labs:  See orders    Hyperlipidemia; controlled   Plan:  Labs:   See orders    Hypertension; controlled   Associated with the following complications:    Diabetes   Plan:    Labs:   See orders        ICD-10-CM    1. Type 2 diabetes mellitus without complication, without long-term current use of insulin (H) E11.9 Hemoglobin A1c   2. Hyperlipidemia, unspecified hyperlipidemia type E78.5 ALT     Lipid Profile     atorvastatin (LIPITOR) 10 MG tablet   3. Essential hypertension I10 Basic metabolic panel       FUTURE APPOINTMENTS:       - Follow-up visit in 3-6 months, depending on lab results.      Natalia Starr MD  [facility]" [facility]

## 2017-11-21 ASSESSMENT — ANXIETY QUESTIONNAIRES: GAD7 TOTAL SCORE: 1

## 2017-12-07 ENCOUNTER — MEDICAL CORRESPONDENCE (OUTPATIENT)
Dept: HEALTH INFORMATION MANAGEMENT | Facility: HOSPITAL | Age: 67
End: 2017-12-07

## 2018-02-19 DIAGNOSIS — E03.9 HYPOTHYROIDISM, UNSPECIFIED TYPE: ICD-10-CM

## 2018-02-19 DIAGNOSIS — K59.00 CONSTIPATION, UNSPECIFIED CONSTIPATION TYPE: ICD-10-CM

## 2018-02-19 NOTE — TELEPHONE ENCOUNTER
Miralax  Last Written Prescription Date: 8/1/17  Last Fill Quantity: 510g # of Refills: 3  Last Office Visit: 11/20/17    Levothyroxine  Last Written Prescription Date: 11/17/17  Last Fill Quantity: 90 # of Refills: 0  Last Office Visit: 11/2017

## 2018-02-21 ENCOUNTER — TELEPHONE (OUTPATIENT)
Dept: FAMILY MEDICINE | Facility: OTHER | Age: 68
End: 2018-02-21

## 2018-02-21 RX ORDER — LEVOTHYROXINE SODIUM 100 UG/1
TABLET ORAL
Qty: 90 TABLET | Refills: 0 | Status: SHIPPED | OUTPATIENT
Start: 2018-02-21 | End: 2018-05-21

## 2018-02-21 RX ORDER — POLYETHYLENE GLYCOL 3350 17 G/17G
POWDER, FOR SOLUTION ORAL
Qty: 510 G | Refills: 0 | Status: SHIPPED | OUTPATIENT
Start: 2018-02-21 | End: 2018-08-17

## 2018-05-21 ENCOUNTER — OFFICE VISIT (OUTPATIENT)
Dept: FAMILY MEDICINE | Facility: OTHER | Age: 68
End: 2018-05-21
Attending: FAMILY MEDICINE
Payer: MEDICARE

## 2018-05-21 VITALS
RESPIRATION RATE: 16 BRPM | HEIGHT: 65 IN | BODY MASS INDEX: 33.99 KG/M2 | DIASTOLIC BLOOD PRESSURE: 84 MMHG | TEMPERATURE: 98.5 F | HEART RATE: 66 BPM | WEIGHT: 204 LBS | OXYGEN SATURATION: 97 % | SYSTOLIC BLOOD PRESSURE: 146 MMHG

## 2018-05-21 DIAGNOSIS — Z12.31 VISIT FOR SCREENING MAMMOGRAM: Primary | ICD-10-CM

## 2018-05-21 DIAGNOSIS — E03.9 HYPOTHYROIDISM, UNSPECIFIED TYPE: ICD-10-CM

## 2018-05-21 DIAGNOSIS — E11.9 TYPE 2 DIABETES MELLITUS WITHOUT COMPLICATION, WITHOUT LONG-TERM CURRENT USE OF INSULIN (H): Primary | ICD-10-CM

## 2018-05-21 DIAGNOSIS — E78.5 HYPERLIPIDEMIA, UNSPECIFIED HYPERLIPIDEMIA TYPE: ICD-10-CM

## 2018-05-21 DIAGNOSIS — E11.9 TYPE 2 DIABETES MELLITUS WITHOUT COMPLICATION, WITHOUT LONG-TERM CURRENT USE OF INSULIN (H): ICD-10-CM

## 2018-05-21 LAB
ALT SERPL W P-5'-P-CCNC: 29 U/L (ref 0–50)
ANION GAP SERPL CALCULATED.3IONS-SCNC: 10 MMOL/L (ref 3–14)
BUN SERPL-MCNC: 15 MG/DL (ref 7–30)
CALCIUM SERPL-MCNC: 9.4 MG/DL (ref 8.5–10.1)
CHLORIDE SERPL-SCNC: 103 MMOL/L (ref 94–109)
CHOLEST SERPL-MCNC: 134 MG/DL
CO2 SERPL-SCNC: 27 MMOL/L (ref 20–32)
CREAT SERPL-MCNC: 0.86 MG/DL (ref 0.52–1.04)
EST. AVERAGE GLUCOSE BLD GHB EST-MCNC: 157 MG/DL
GFR SERPL CREATININE-BSD FRML MDRD: 66 ML/MIN/1.7M2
GLUCOSE SERPL-MCNC: 126 MG/DL (ref 70–99)
HBA1C MFR BLD: 7.1 % (ref 0–5.6)
HDLC SERPL-MCNC: 34 MG/DL
LDLC SERPL CALC-MCNC: 61 MG/DL
NONHDLC SERPL-MCNC: 100 MG/DL
POTASSIUM SERPL-SCNC: 4.3 MMOL/L (ref 3.4–5.3)
SODIUM SERPL-SCNC: 140 MMOL/L (ref 133–144)
TRIGL SERPL-MCNC: 196 MG/DL
TSH SERPL DL<=0.005 MIU/L-ACNC: 0.56 MU/L (ref 0.4–4)

## 2018-05-21 PROCEDURE — 99214 OFFICE O/P EST MOD 30 MIN: CPT | Performed by: FAMILY MEDICINE

## 2018-05-21 PROCEDURE — 83036 HEMOGLOBIN GLYCOSYLATED A1C: CPT | Mod: ZL | Performed by: FAMILY MEDICINE

## 2018-05-21 PROCEDURE — 36415 COLL VENOUS BLD VENIPUNCTURE: CPT | Mod: ZL | Performed by: FAMILY MEDICINE

## 2018-05-21 PROCEDURE — 80048 BASIC METABOLIC PNL TOTAL CA: CPT | Mod: ZL | Performed by: FAMILY MEDICINE

## 2018-05-21 PROCEDURE — 80061 LIPID PANEL: CPT | Mod: ZL | Performed by: FAMILY MEDICINE

## 2018-05-21 PROCEDURE — 40000788 ZZHCL STATISTIC ESTIMATED AVERAGE GLUCOSE: Mod: ZL | Performed by: FAMILY MEDICINE

## 2018-05-21 PROCEDURE — G0463 HOSPITAL OUTPT CLINIC VISIT: HCPCS

## 2018-05-21 PROCEDURE — 84443 ASSAY THYROID STIM HORMONE: CPT | Mod: ZL | Performed by: FAMILY MEDICINE

## 2018-05-21 PROCEDURE — 84460 ALANINE AMINO (ALT) (SGPT): CPT | Mod: ZL | Performed by: FAMILY MEDICINE

## 2018-05-21 RX ORDER — METFORMIN HCL 500 MG
500 TABLET, EXTENDED RELEASE 24 HR ORAL 2 TIMES DAILY WITH MEALS
Qty: 180 TABLET | Refills: 3 | Status: SHIPPED | OUTPATIENT
Start: 2018-05-21 | End: 2018-08-13

## 2018-05-21 RX ORDER — METFORMIN HCL 500 MG
500 TABLET, EXTENDED RELEASE 24 HR ORAL
Qty: 90 TABLET | Refills: 3 | Status: SHIPPED | OUTPATIENT
Start: 2018-05-21 | End: 2018-05-21

## 2018-05-21 RX ORDER — LEVOTHYROXINE SODIUM 100 UG/1
100 TABLET ORAL DAILY
Qty: 90 TABLET | Refills: 3 | Status: SHIPPED | OUTPATIENT
Start: 2018-05-21 | End: 2019-05-13

## 2018-05-21 ASSESSMENT — ANXIETY QUESTIONNAIRES
3. WORRYING TOO MUCH ABOUT DIFFERENT THINGS: SEVERAL DAYS
1. FEELING NERVOUS, ANXIOUS, OR ON EDGE: SEVERAL DAYS
5. BEING SO RESTLESS THAT IT IS HARD TO SIT STILL: NOT AT ALL
6. BECOMING EASILY ANNOYED OR IRRITABLE: SEVERAL DAYS
7. FEELING AFRAID AS IF SOMETHING AWFUL MIGHT HAPPEN: NOT AT ALL
GAD7 TOTAL SCORE: 5
2. NOT BEING ABLE TO STOP OR CONTROL WORRYING: SEVERAL DAYS
4. TROUBLE RELAXING: SEVERAL DAYS

## 2018-05-21 ASSESSMENT — PAIN SCALES - GENERAL: PAINLEVEL: NO PAIN (0)

## 2018-05-21 NOTE — MR AVS SNAPSHOT
After Visit Summary   5/21/2018    Kayla Lugo    MRN: [id number]           Patient Information     Date Of Birth          1950        Visit Information        Provider Department      5/21/2018 9:45 AM Natalia Starr MD [facility]        Today's Diagnoses     Type 2 diabetes mellitus without complication, without long-term current use of insulin (H)    -  1    Hyperlipidemia, unspecified hyperlipidemia type        Hypothyroidism, unspecified type           Follow-ups after your visit        Follow-up notes from your care team     Return in about 6 months (around 11/21/2018).      Your next 10 appointments already scheduled     Aug 08, 2018  9:00 AM CDT   (Arrive by 8:45 AM)   MA SCREENING DIGITAL BILATERAL with [facility]   [facility] ([facility] )    [address] [address]   [phone]           Do not use any powder, lotion or deodorant under your arms or on your breast. If you do, we will ask you to remove it before your exam.  Wear comfortable, two-piece clothing.  If you have any allergies, tell your care team.  Bring any previous mammograms from other facilities or have them mailed to the breast center.            Aug 10, 2018  9:00 AM CDT   (Arrive by 8:45 AM)   Office Visit with Natalia Starr MD   [facility] ([facility] )    [address] [address]   [phone]           Bring a current list of meds and any records pertaining to this visit.  For Physicals, please bring immunization records and any forms needing to be filled out.  Please arrive 15 minutes early to complete paperwork and register.              Future tests that were ordered for you today     Open Future Orders        Priority Expected Expires Ordered    MA Screening Digital Bilateral ([facility]) Routine  5/21/2019 5/21/2018            Who to contact     If  "you have questions or need follow up information about today's clinic visit or your schedule please contact [facility] directly at [phone].  Normal or non-critical lab and imaging results will be communicated to you by MyChart, letter or phone within 4 business days after the clinic has received the results. If you do not hear from us within 7 days, please contact the clinic through [employer] or phone. If you have a critical or abnormal lab result, we will notify you by phone as soon as possible.  Submit refill requests through [employer] or call your pharmacy and they will forward the refill request to us. Please allow 3 business days for your refill to be completed.          Additional Information About Your Visit        [employer] Information     [employer] gives you secure access to your electronic health record. If you see a primary care provider, you can also send messages to your care team and make appointments. If you have questions, please call your primary care clinic.  If you do not have a primary care provider, please call [phone] and they will assist you.        Care EveryWhere ID     This is your Care EveryWhere ID. This could be used by other organizations to access your [facility] medical records  [id number]        Your Vitals Were     Pulse Temperature Respirations Height Pulse Oximetry BMI (Body Mass Index)    66 98.5  F (36.9  C) (Tympanic) 16 5' 5\" (1.651 m) 97% 33.95 kg/m2       Blood Pressure from Last 3 Encounters:   05/21/18 146/84   11/20/17 140/86   08/01/17 128/80    Weight from Last 3 Encounters:   05/21/18 204 lb (92.5 kg)   11/20/17 192 lb (87.1 kg)   08/01/17 200 lb (90.7 kg)              We Performed the Following     ALT     Basic metabolic panel     Hemoglobin A1c     Lipid Profile     TSH          Today's Medication Changes          These changes are accurate as of 5/21/18  1:33 PM.  If you have any questions, ask your nurse or doctor.               These medicines " have changed or have updated prescriptions.        Dose/Directions    blood glucose monitoring lancets   This may have changed:  Another medication with the same name was removed. Continue taking this medication, and follow the directions you see here.   Used for:  Type 2 diabetes mellitus without complication, without long-term current use of insulin (H)   Changed by:  Natalia Starr MD        Use to test blood sugar one time daily or as directed.   Quantity:  102 each   Refills:  6       levothyroxine 100 MCG tablet   Commonly known as:  SYNTHROID/LEVOTHROID   This may have changed:  See the new instructions.   Used for:  Hypothyroidism, unspecified type   Changed by:  Natalia Strar MD        Dose:  100 mcg   Take 1 tablet (100 mcg) by mouth daily   Quantity:  90 tablet   Refills:  3       metFORMIN 500 MG 24 hr tablet   Commonly known as:  GLUCOPHAGE-XR   This may have changed:  See the new instructions.   Used for:  Type 2 diabetes mellitus without complication, without long-term current use of insulin (H)   Changed by:  Natalia Starr MD        Dose:  500 mg   Take 1 tablet (500 mg) by mouth daily (with dinner)   Quantity:  90 tablet   Refills:  3         Stop taking these medicines if you haven't already. Please contact your care team if you have questions.     alcohol swab prep pads   Stopped by:  Natalia Starr MD           fish oil-omega-3 fatty acids 1000 MG capsule   Stopped by:  Natalia Starr MD                Where to get your medicines      These medications were sent to [facility] [id number] [place] [address] [address] [facility] [address] [place] [address]     Phone:  [phone]     levothyroxine 100 MCG tablet    metFORMIN 500 MG 24 hr tablet                Primary Care Provider Office Phone # Fax #    Natalia Starr [phone] [address] [address] [address]  Access to Services     [facility]: Hadii aad ku hadnickayde Soaustyn, waaxda luqadaha, qaybta kaalmasusana oconnell. So [facility] [phone].    ATENCIÓN: Si habla español, tiene a cordero disposición servicios gratuitos de asistencia lingüística. Llame al [phone].    We comply with applicable federal civil rights laws and [place] laws. We do not discriminate on the basis of race, color, national origin, age, disability, sex, sexual orientation, or gender identity.            Thank you!     Thank you for choosing [facility]  for your care. Our goal is always to provide you with excellent care. Hearing back from our patients is one way we can continue to improve our services. Please take a few minutes to complete the written survey that you may receive in the mail after your visit with us. Thank you!             Your Updated Medication List - Protect others around you: Learn how to safely use, store and throw away your medicines at www.disposemymeds.org.          This list is accurate as of 5/21/18  1:33 PM.  Always use your most recent med list.                   Brand Name Dispense Instructions for use Diagnosis    * ACCU-CHEK SMARTVIEW test strip   Generic drug:  blood glucose monitoring     100 each    use to test 3 times daily or as directed    Diabetes mellitus, type 2 (H)       * blood glucose monitoring test strip    ACCU-CHEK SMARTVIEW    100 strip    Use to test blood sugar one time daily.    Type 2 diabetes mellitus without complication, without long-term current use of insulin (H)       atorvastatin 10 MG tablet    LIPITOR    90 tablet    Take 1 tablet (10 mg) by mouth daily    Hyperlipidemia, unspecified hyperlipidemia type       blood glucose calibration solution     1 Bottle    Use to calibrate blood glucose monitor as directed.    Type 2 diabetes mellitus without complication, without long-term current use of insulin (H)       blood glucose lancing  device     1 each    Lancing device to be used with lancets.    Type 2 diabetes mellitus without complication, without long-term current use of insulin (H)       blood glucose monitoring lancets     102 each    Use to test blood sugar one time daily or as directed.    Type 2 diabetes mellitus without complication, without long-term current use of insulin (H)       blood glucose monitoring meter device kit     1 kit    Use to test blood sugar one time daily.    Type 2 diabetes mellitus without complication, without long-term current use of insulin (H)       cholecalciferol 1000 UNIT tablet    vitamin D3     Take 4 tablets by mouth daily.        ibuprofen 600 MG tablet    ADVIL/MOTRIN    90 tablet    Take 1 tablet (600 mg) by mouth every 6 hours as needed for moderate pain    Back pain       levothyroxine 100 MCG tablet    SYNTHROID/LEVOTHROID    90 tablet    Take 1 tablet (100 mcg) by mouth daily    Hypothyroidism, unspecified type       metFORMIN 500 MG 24 hr tablet    GLUCOPHAGE-XR    90 tablet    Take 1 tablet (500 mg) by mouth daily (with dinner)    Type 2 diabetes mellitus without complication, without long-term current use of insulin (H)       polyethylene glycol powder    MIRALAX/GLYCOLAX    510 g    TAKE 17 GRAMS BY MOUTH DAILY    Constipation, unspecified constipation type       * Notice:  This list has 2 medication(s) that are the same as other medications prescribed for you. Read the directions carefully, and ask your doctor or other care provider to review them with you.

## 2018-05-21 NOTE — PROGRESS NOTES
SUBJECTIVE:   Kayla Lugo is a 68 year old female who presents to clinic today for the following health issues:      Diabetes Follow-up    Patient is checking blood sugars: once daily.  Results are as follows:         Variety of times    Diabetic concerns: None     Symptoms of hypoglycemia (low blood sugar): none     Paresthesias (numbness or burning in feet) or sores: No     Date of last diabetic eye exam: less than one year    Hyperlipidemia Follow-Up      Rate your low fat/cholesterol diet?: fair    Taking statin?  Yes, no muscle aches from statin    Other lipid medications/supplements?:  none    Hypertension Follow-up      Outpatient blood pressures are not being checked.    Low Salt Diet: no added salt    BP Readings from Last 2 Encounters:   05/21/18 146/84   11/20/17 140/86     Hemoglobin A1C (%)   Date Value   11/20/2017 6.3 (H)   08/01/2017 6.8 (H)     LDL Cholesterol Calculated (mg/dL)   Date Value   11/20/2017 56   08/01/2017 108 (H)       Amount of exercise or physical activity: not that much    Problems taking medications regularly: No    Medication side effects: none    Diet: low salt and low fat/cholesterol        Problem list and histories reviewed & adjusted, as indicated.  Additional history: as documented    Patient Active Problem List   Diagnosis     Essential hypertension     Hypothyroidism     Esophageal reflux     Osteoarthrosis, unspecified whether generalized or localized, involving lower leg     Eczema     Hearing loss     Advanced care planning/counseling discussion     Skin lesion of face     Basal cell carcinoma of upper lip     S/P excision of skin lesion, follow-up exam     Diabetes mellitus, type 2 (H)     History of labyrinthitis     Hyperlipidemia, unspecified hyperlipidemia type     Past Surgical History:   Procedure Laterality Date     ARTHROSCOPY KNEE  2001    RT      COLONOSCOPY  2004     COLONOSCOPY  6/13/12    Dr. Gorman; 5 year recall given     COLONOSCOPY  07/11/2017      colonoscopy with polypectomy  2007    repeat in five      DILATION AND CURETTAGE  1996     EXCISE LESION LIP Right 9/16/2014    Procedure: EXCISE LESION LIP;  Surgeon: Bri Flores MD;  Location: [facility]     ORTHOPEDIC SURGERY Right 10-5-2015    right shoulder arthoscopy RCR     [facility] with BSO  1997       Social History   Substance Use Topics     Smoking status: Never Smoker     Smokeless tobacco: Never Used     Alcohol use No     Family History   Problem Relation Age of Onset     Alcohol/Drug Father      alcoholism     C.A.D. Father      (cause of death)      CANCER Mother      basal cell     Ovarian Cancer Mother      Alcohol/Drug Son      alcoholism      C.A.D. Son      DIABETES Brother      DIABETES Maternal Grandmother          Current Outpatient Prescriptions   Medication Sig Dispense Refill     atorvastatin (LIPITOR) 10 MG tablet Take 1 tablet (10 mg) by mouth daily 90 tablet 3     cholecalciferol (VITAMIN D3) 1000 UNIT tablet Take 4 tablets by mouth daily.       levothyroxine (SYNTHROID/LEVOTHROID) 100 MCG tablet Take 1 tablet (100 mcg) by mouth daily 90 tablet 3     metFORMIN (GLUCOPHAGE-XR) 500 MG 24 hr tablet Take 1 tablet (500 mg) by mouth daily (with dinner) 90 tablet 3     ACCU-CHEK SMARTVIEW test strip use to test 3 times daily or as directed 100 each 10     blood glucose (ACCU-CHEK FASTCLIX) lancing device Lancing device to be used with lancets. 1 each 0     blood glucose calibration (ACCU-CHEK SMARTVIEW CONTROL) solution Use to calibrate blood glucose monitor as directed. 1 Bottle 3     blood glucose monitoring (ACCU-CHEK FASTCLIX) lancets Use to test blood sugar one time daily or as directed. 102 each 6     blood glucose monitoring (ACCU-CHEK DESTINI SMARTVIEW) meter device kit Use to test blood sugar one time daily. 1 kit 0     blood glucose monitoring (ACCU-CHEK SMARTVIEW) test strip Use to test blood sugar one time daily. 100 strip 6     ibuprofen (ADVIL,MOTRIN) 600 MG tablet Take 1 tablet  "(600 mg) by mouth every 6 hours as needed for moderate pain (Patient not taking: Reported on 5/21/2018) 90 tablet 5     polyethylene glycol (MIRALAX/GLYCOLAX) powder TAKE 17 GRAMS BY MOUTH DAILY (Patient not taking: Reported on 5/21/2018) 510 g 0     [DISCONTINUED] levothyroxine (SYNTHROID/LEVOTHROID) 100 MCG tablet TAKE 1 TABLET(100 MCG) BY MOUTH DAILY 90 tablet 0     [DISCONTINUED] metFORMIN (GLUCOPHAGE-XR) 500 MG 24 hr tablet TAKE 1 TABLET(500 MG) BY MOUTH DAILY WITH DINNER 90 tablet 1     Allergies   Allergen Reactions     Hydrochlorothiazide Rash     Atenolol Other (See Comments)     Bradycardia      Lisinopril Cough       Reviewed and updated as needed this visit by clinical staff  Tobacco  Allergies  Meds  Problems  Med Hx  Surg Hx  Fam Hx  Soc Hx        Reviewed and updated as needed this visit by Provider         ROS:  Constitutional, HEENT, cardiovascular, pulmonary, gi and gu systems are negative, except as otherwise noted.    OBJECTIVE:     /84 (BP Location: Left arm, Patient Position: Sitting, Cuff Size: Adult Large)  Pulse 66  Temp 98.5  F (36.9  C) (Tympanic)  Resp 16  Ht 5' 5\" (1.651 m)  Wt 204 lb (92.5 kg)  SpO2 97%  BMI 33.95 kg/m2  Body mass index is 33.95 kg/(m^2).  GENERAL: healthy, alert and no distress  PSYCH: mentation appears normal, affect normal/bright    Diagnostic Test Results:  none     ASSESSMENT/PLAN:     Diabetes Type II, A1c Controlled, non-insulin dependent   Associated with the following complications    Renal Complications:  None    Ophthalmologic Complications: None    Neurologic Complications: None    Peripheral Vascular Complications:  None    Other: None   Plan:  Labs:  See orders      Hyperlipidemia; controlled   Plan:  Labs:   See orders    Hypothyroidism; controlled/euthyroid   Plan:  Labs:  TSH          ICD-10-CM    1. Type 2 diabetes mellitus without complication, without long-term current use of insulin (H) E11.9 metFORMIN (GLUCOPHAGE-XR) 500 MG 24 " hr tablet     Basic metabolic panel     Hemoglobin A1c   2. Hyperlipidemia, unspecified hyperlipidemia type E78.5 ALT     Lipid Profile   3. Hypothyroidism, unspecified type E03.9 levothyroxine (SYNTHROID/LEVOTHROID) 100 MCG tablet     TSH       FUTURE APPOINTMENTS:       - Follow-up visit in 6 months, sooner as needed.      Natalia Starr MD  [facility]

## 2018-05-21 NOTE — NURSING NOTE
"Chief Complaint   Patient presents with     Diabetes     Hypertension     Lipids       Initial /84 (BP Location: Left arm, Patient Position: Sitting, Cuff Size: Adult Large)  Pulse 66  Temp 98.5  F (36.9  C) (Tympanic)  Resp 16  Ht 5' 5\" (1.651 m)  Wt 204 lb (92.5 kg)  SpO2 97%  BMI 33.95 kg/m2 Estimated body mass index is 33.95 kg/(m^2) as calculated from the following:    Height as of this encounter: 5' 5\" (1.651 m).    Weight as of this encounter: 204 lb (92.5 kg).  Medication Reconciliation: complete    Anamaria Gorver LPN      "

## 2018-05-22 ASSESSMENT — PATIENT HEALTH QUESTIONNAIRE - PHQ9: SUM OF ALL RESPONSES TO PHQ QUESTIONS 1-9: 0

## 2018-05-22 ASSESSMENT — ANXIETY QUESTIONNAIRES: GAD7 TOTAL SCORE: 5

## 2018-06-08 ENCOUNTER — OFFICE VISIT (OUTPATIENT)
Dept: FAMILY MEDICINE | Facility: OTHER | Age: 68
End: 2018-06-08
Attending: NURSE PRACTITIONER
Payer: MEDICARE

## 2018-06-08 VITALS
TEMPERATURE: 98.4 F | OXYGEN SATURATION: 97 % | DIASTOLIC BLOOD PRESSURE: 76 MMHG | BODY MASS INDEX: 34.12 KG/M2 | WEIGHT: 204.8 LBS | HEART RATE: 68 BPM | HEIGHT: 65 IN | SYSTOLIC BLOOD PRESSURE: 138 MMHG | RESPIRATION RATE: 16 BRPM

## 2018-06-08 DIAGNOSIS — H65.192 OTHER ACUTE NONSUPPURATIVE OTITIS MEDIA OF LEFT EAR, RECURRENCE NOT SPECIFIED: Primary | ICD-10-CM

## 2018-06-08 PROCEDURE — G0463 HOSPITAL OUTPT CLINIC VISIT: HCPCS

## 2018-06-08 PROCEDURE — 99213 OFFICE O/P EST LOW 20 MIN: CPT | Performed by: NURSE PRACTITIONER

## 2018-06-08 RX ORDER — NEOMYCIN SULFATE, POLYMYXIN B SULFATE AND HYDROCORTISONE 10; 3.5; 1 MG/ML; MG/ML; [USP'U]/ML
3 SUSPENSION/ DROPS AURICULAR (OTIC) 4 TIMES DAILY
Qty: 5 ML | Refills: 0 | Status: SHIPPED | OUTPATIENT
Start: 2018-06-08 | End: 2018-06-15

## 2018-06-08 ASSESSMENT — ANXIETY QUESTIONNAIRES
6. BECOMING EASILY ANNOYED OR IRRITABLE: NOT AT ALL
2. NOT BEING ABLE TO STOP OR CONTROL WORRYING: NOT AT ALL
IF YOU CHECKED OFF ANY PROBLEMS ON THIS QUESTIONNAIRE, HOW DIFFICULT HAVE THESE PROBLEMS MADE IT FOR YOU TO DO YOUR WORK, TAKE CARE OF THINGS AT HOME, OR GET ALONG WITH OTHER PEOPLE: NOT DIFFICULT AT ALL
4. TROUBLE RELAXING: SEVERAL DAYS
5. BEING SO RESTLESS THAT IT IS HARD TO SIT STILL: NOT AT ALL
7. FEELING AFRAID AS IF SOMETHING AWFUL MIGHT HAPPEN: NOT AT ALL
GAD7 TOTAL SCORE: 3
3. WORRYING TOO MUCH ABOUT DIFFERENT THINGS: SEVERAL DAYS
1. FEELING NERVOUS, ANXIOUS, OR ON EDGE: SEVERAL DAYS

## 2018-06-08 ASSESSMENT — PAIN SCALES - GENERAL: PAINLEVEL: NO PAIN (1)

## 2018-06-08 NOTE — NURSING NOTE
"Chief Complaint   Patient presents with     Ear Problem     left       Initial /76 (BP Location: Right arm, Patient Position: Chair, Cuff Size: Adult Large)  Pulse 68  Temp 98.4  F (36.9  C) (Tympanic)  Resp 16  Ht 5' 5\" (1.651 m)  Wt 204 lb 12.8 oz (92.9 kg)  SpO2 97%  BMI 34.08 kg/m2 Estimated body mass index is 34.08 kg/(m^2) as calculated from the following:    Height as of this encounter: 5' 5\" (1.651 m).    Weight as of this encounter: 204 lb 12.8 oz (92.9 kg).  Medication Reconciliation: complete    Pamela M. Lechevalier, LPN    "

## 2018-06-08 NOTE — PROGRESS NOTES
SUBJECTIVE:   Kayla Lugo is a 68 year old female who presents to clinic today for the following health issues:  Left Ear Pain    Concern - Left Ear pain  Onset: end of may 5/29/18    Description:   pain    Intensity: mild    Progression of Symptoms:  improving and intermittent    Accompanying Signs & Symptoms:  Was dizzy took vertigo medication got better    Previous history of similar problem:   No     Precipitating factors:   Worsened by: unknown    Alleviating factors:  Improved by: unknown     Therapies Tried and outcome: nothing     Problem list and histories reviewed & adjusted, as indicated.  Additional history: as documented    Patient Active Problem List   Diagnosis     Essential hypertension     Hypothyroidism     Esophageal reflux     Osteoarthrosis, unspecified whether generalized or localized, involving lower leg     Eczema     Hearing loss     Advanced care planning/counseling discussion     Skin lesion of face     Basal cell carcinoma of upper lip     S/P excision of skin lesion, follow-up exam     Diabetes mellitus, type 2 (H)     History of labyrinthitis     Hyperlipidemia, unspecified hyperlipidemia type     Past Surgical History:   Procedure Laterality Date     ARTHROSCOPY KNEE  2001    RT      COLONOSCOPY  2004     COLONOSCOPY  6/13/12    Dr. Gorman; 5 year recall given     COLONOSCOPY  07/11/2017     colonoscopy with polypectomy  2007    repeat in five      DILATION AND CURETTAGE  1996     EXCISE LESION LIP Right 9/16/2014    Procedure: EXCISE LESION LIP;  Surgeon: Bri Flores MD;  Location: [facility]     ORTHOPEDIC SURGERY Right 10-5-2015    right shoulder arthoscopy RCR     NADIRA with BSO  1997       Social History   Substance Use Topics     Smoking status: Never Smoker     Smokeless tobacco: Never Used     Alcohol use No     Family History   Problem Relation Age of Onset     Alcohol/Drug Father      alcoholism     C.A.D. Father      (cause of death)      CANCER Mother      basal  cell     Ovarian Cancer Mother      Alcohol/Drug Son      alcoholism      C.A.D. Son      DIABETES Brother      DIABETES Maternal Grandmother          Current Outpatient Prescriptions   Medication Sig Dispense Refill     atorvastatin (LIPITOR) 10 MG tablet Take 1 tablet (10 mg) by mouth daily 90 tablet 3     blood glucose calibration (ACCU-CHEK SMARTVIEW CONTROL) solution Use to calibrate blood glucose monitor as directed. 1 Bottle 3     blood glucose monitoring (ACCU-CHEK FASTCLIX) lancets Use to test blood sugar one time daily or as directed. 102 each 6     blood glucose monitoring (ACCU-CHEK DESTINI SMARTVIEW) meter device kit Use to test blood sugar one time daily. 1 kit 0     blood glucose monitoring (ACCU-CHEK SMARTVIEW) test strip Use to test blood sugar one time daily. 100 strip 6     cholecalciferol (VITAMIN D3) 1000 UNIT tablet Take 4 tablets by mouth daily.       ibuprofen (ADVIL,MOTRIN) 600 MG tablet Take 1 tablet (600 mg) by mouth every 6 hours as needed for moderate pain 90 tablet 5     levothyroxine (SYNTHROID/LEVOTHROID) 100 MCG tablet Take 1 tablet (100 mcg) by mouth daily 90 tablet 3     metFORMIN (GLUCOPHAGE-XR) 500 MG 24 hr tablet Take 1 tablet (500 mg) by mouth 2 times daily (with meals) 180 tablet 3     polyethylene glycol (MIRALAX/GLYCOLAX) powder TAKE 17 GRAMS BY MOUTH DAILY 510 g 0     Allergies   Allergen Reactions     Hydrochlorothiazide Rash     Atenolol Other (See Comments)     Bradycardia      Lisinopril Cough     Recent Labs   Lab Test  05/21/18   1010  11/20/17   1111  08/01/17   0936   05/23/15   1040   A1C  7.1*  6.3*  6.8*   < >   --    LDL  61  56  108*   < >   --    HDL  34*  35*  33*   < >   --    TRIG  196*  220*  234*   < >   --    ALT  29  26   --    --   24   CR  0.86  0.77  0.76   < >  0.68   GFRESTIMATED  66  75  76   < >  87   GFRESTBLACK  80  >90  >90   GFR Calc     < >  >90   GFR Calc     POTASSIUM  4.3  4.4  4.1   < >  4.0   TSH  0.56   --   " 0.30*   < >  0.30*    < > = values in this interval not displayed.      BP Readings from Last 3 Encounters:   06/08/18 138/76   05/21/18 146/84   11/20/17 140/86    Wt Readings from Last 3 Encounters:   06/08/18 204 lb 12.8 oz (92.9 kg)   05/21/18 204 lb (92.5 kg)   11/20/17 192 lb (87.1 kg)                    Reviewed and updated as needed this visit by clinical staff  Tobacco  Allergies       Reviewed and updated as needed this visit by Provider         ROS:  Constitutional, HEENT, cardiovascular, pulmonary, gi and gu systems are negative, except as otherwise noted.    OBJECTIVE:     /76 (BP Location: Right arm, Patient Position: Chair, Cuff Size: Adult Large)  Pulse 68  Temp 98.4  F (36.9  C) (Tympanic)  Resp 16  Ht 5' 5\" (1.651 m)  Wt 204 lb 12.8 oz (92.9 kg)  SpO2 97%  BMI 34.08 kg/m2  Body mass index is 34.08 kg/(m^2).  GENERAL: healthy, alert and no distress  HENT: normal cephalic/atraumatic, right ear: normal: no effusions, no erythema, normal landmarks, left ear: erythematous, TM dull and retracted, nose and mouth without ulcers or lesions, oropharynx clear and oral mucous membranes moist  NECK: no adenopathy, no asymmetry, masses, or scars and thyroid normal to palpation  RESP: lungs clear to auscultation - no rales, rhonchi or wheezes  CV: regular rate and rhythm, normal S1 S2, no S3 or S4, no murmur, click or rub, no peripheral edema and peripheral pulses strong  MS: no gross musculoskeletal defects noted, no edema  NEURO: Normal strength and tone, mentation intact and speech normal  PSYCH: mentation appears normal, affect normal/bright      ASSESSMENT/PLAN:       1. Other acute nonsuppurative otitis media of left ear, recurrence not specified  symptomtaic  - neomycin-polymyxin-hydrocortisone (CORTISPORIN) 3.5-86157-5 otic suspension; Place 3 drops Into the left ear 4 times daily for 7 days  Dispense: 5 mL; Refill: 0    FUTURE APPOINTMENTS:       - Follow-up visit if no improvement or any " worsening     Heaven Jacobo, NP  [facility]

## 2018-06-08 NOTE — PATIENT INSTRUCTIONS
ASSESSMENT/PLAN:       1. Other acute nonsuppurative otitis media of left ear, recurrence not specified  symptomtaic  - neomycin-polymyxin-hydrocortisone (CORTISPORIN) 3.5-89155-4 otic suspension; Place 3 drops Into the left ear 4 times daily for 7 days  Dispense: 5 mL; Refill: 0    FUTURE APPOINTMENTS:       - Follow-up visit if no improvement or any worsening     Heaven Jacobo, NP  [facility]

## 2018-06-09 ASSESSMENT — ANXIETY QUESTIONNAIRES: GAD7 TOTAL SCORE: 3

## 2018-06-09 ASSESSMENT — PATIENT HEALTH QUESTIONNAIRE - PHQ9: SUM OF ALL RESPONSES TO PHQ QUESTIONS 1-9: 0

## 2018-08-08 ENCOUNTER — RADIANT APPOINTMENT (OUTPATIENT)
Dept: MAMMOGRAPHY | Facility: OTHER | Age: 68
End: 2018-08-08
Attending: FAMILY MEDICINE
Payer: MEDICARE

## 2018-08-08 DIAGNOSIS — Z12.31 VISIT FOR SCREENING MAMMOGRAM: ICD-10-CM

## 2018-08-08 PROCEDURE — 77067 SCR MAMMO BI INCL CAD: CPT | Mod: TC

## 2018-08-10 ENCOUNTER — OFFICE VISIT (OUTPATIENT)
Dept: FAMILY MEDICINE | Facility: OTHER | Age: 68
End: 2018-08-10
Attending: FAMILY MEDICINE
Payer: MEDICARE

## 2018-08-10 VITALS
HEIGHT: 65 IN | OXYGEN SATURATION: 98 % | WEIGHT: 198.2 LBS | DIASTOLIC BLOOD PRESSURE: 74 MMHG | BODY MASS INDEX: 33.02 KG/M2 | HEART RATE: 66 BPM | RESPIRATION RATE: 18 BRPM | SYSTOLIC BLOOD PRESSURE: 134 MMHG | TEMPERATURE: 96.3 F

## 2018-08-10 DIAGNOSIS — I10 ESSENTIAL HYPERTENSION: ICD-10-CM

## 2018-08-10 DIAGNOSIS — C44.01 BASAL CELL CARCINOMA OF UPPER LIP: ICD-10-CM

## 2018-08-10 DIAGNOSIS — E11.9 TYPE 2 DIABETES MELLITUS WITHOUT COMPLICATION, WITHOUT LONG-TERM CURRENT USE OF INSULIN (H): Primary | ICD-10-CM

## 2018-08-10 DIAGNOSIS — E78.5 HYPERLIPIDEMIA, UNSPECIFIED HYPERLIPIDEMIA TYPE: ICD-10-CM

## 2018-08-10 DIAGNOSIS — E03.9 HYPOTHYROIDISM, UNSPECIFIED TYPE: ICD-10-CM

## 2018-08-10 LAB
ALT SERPL W P-5'-P-CCNC: 35 U/L (ref 0–50)
ANION GAP SERPL CALCULATED.3IONS-SCNC: 8 MMOL/L (ref 3–14)
BUN SERPL-MCNC: 14 MG/DL (ref 7–30)
CALCIUM SERPL-MCNC: 9.6 MG/DL (ref 8.5–10.1)
CHLORIDE SERPL-SCNC: 104 MMOL/L (ref 94–109)
CHOLEST SERPL-MCNC: 128 MG/DL
CO2 SERPL-SCNC: 28 MMOL/L (ref 20–32)
CREAT SERPL-MCNC: 0.82 MG/DL (ref 0.52–1.04)
CREAT UR-MCNC: 170 MG/DL
EST. AVERAGE GLUCOSE BLD GHB EST-MCNC: 154 MG/DL
GFR SERPL CREATININE-BSD FRML MDRD: 70 ML/MIN/1.7M2
GLUCOSE SERPL-MCNC: 117 MG/DL (ref 70–99)
HBA1C MFR BLD: 7 % (ref 0–5.6)
HDLC SERPL-MCNC: 34 MG/DL
LDLC SERPL CALC-MCNC: 68 MG/DL
MICROALBUMIN UR-MCNC: 10 MG/L
MICROALBUMIN/CREAT UR: 6 MG/G CR (ref 0–25)
NONHDLC SERPL-MCNC: 94 MG/DL
POTASSIUM SERPL-SCNC: 4 MMOL/L (ref 3.4–5.3)
SODIUM SERPL-SCNC: 140 MMOL/L (ref 133–144)
TRIGL SERPL-MCNC: 131 MG/DL

## 2018-08-10 PROCEDURE — 40000788 ZZHCL STATISTIC ESTIMATED AVERAGE GLUCOSE: Mod: ZL | Performed by: FAMILY MEDICINE

## 2018-08-10 PROCEDURE — 80061 LIPID PANEL: CPT | Mod: ZL | Performed by: FAMILY MEDICINE

## 2018-08-10 PROCEDURE — 82043 UR ALBUMIN QUANTITATIVE: CPT | Mod: ZL | Performed by: FAMILY MEDICINE

## 2018-08-10 PROCEDURE — G0463 HOSPITAL OUTPT CLINIC VISIT: HCPCS

## 2018-08-10 PROCEDURE — 83036 HEMOGLOBIN GLYCOSYLATED A1C: CPT | Mod: ZL | Performed by: FAMILY MEDICINE

## 2018-08-10 PROCEDURE — 36415 COLL VENOUS BLD VENIPUNCTURE: CPT | Mod: ZL | Performed by: FAMILY MEDICINE

## 2018-08-10 PROCEDURE — 99214 OFFICE O/P EST MOD 30 MIN: CPT | Performed by: FAMILY MEDICINE

## 2018-08-10 PROCEDURE — 84460 ALANINE AMINO (ALT) (SGPT): CPT | Mod: ZL | Performed by: FAMILY MEDICINE

## 2018-08-10 PROCEDURE — 80048 BASIC METABOLIC PNL TOTAL CA: CPT | Mod: ZL | Performed by: FAMILY MEDICINE

## 2018-08-10 ASSESSMENT — ANXIETY QUESTIONNAIRES
5. BEING SO RESTLESS THAT IT IS HARD TO SIT STILL: NOT AT ALL
6. BECOMING EASILY ANNOYED OR IRRITABLE: SEVERAL DAYS
GAD7 TOTAL SCORE: 5
2. NOT BEING ABLE TO STOP OR CONTROL WORRYING: NOT AT ALL
3. WORRYING TOO MUCH ABOUT DIFFERENT THINGS: SEVERAL DAYS
7. FEELING AFRAID AS IF SOMETHING AWFUL MIGHT HAPPEN: SEVERAL DAYS
1. FEELING NERVOUS, ANXIOUS, OR ON EDGE: SEVERAL DAYS
IF YOU CHECKED OFF ANY PROBLEMS ON THIS QUESTIONNAIRE, HOW DIFFICULT HAVE THESE PROBLEMS MADE IT FOR YOU TO DO YOUR WORK, TAKE CARE OF THINGS AT HOME, OR GET ALONG WITH OTHER PEOPLE: NOT DIFFICULT AT ALL

## 2018-08-10 ASSESSMENT — PAIN SCALES - GENERAL: PAINLEVEL: NO PAIN (0)

## 2018-08-10 ASSESSMENT — PATIENT HEALTH QUESTIONNAIRE - PHQ9: 5. POOR APPETITE OR OVEREATING: SEVERAL DAYS

## 2018-08-10 NOTE — NURSING NOTE
"Chief Complaint   Patient presents with     Recheck Medication       Initial /74 (BP Location: Left arm, Patient Position: Sitting, Cuff Size: Adult Regular)  Pulse 66  Temp 96.3  F (35.7  C) (Tympanic)  Resp 18  Ht 5' 5\" (1.651 m)  Wt 198 lb 3.2 oz (89.9 kg)  SpO2 98%  BMI 32.98 kg/m2 Estimated body mass index is 32.98 kg/(m^2) as calculated from the following:    Height as of this encounter: 5' 5\" (1.651 m).    Weight as of this encounter: 198 lb 3.2 oz (89.9 kg).  Medication Reconciliation: complete    Chloe Dinero MA  "

## 2018-08-10 NOTE — PROGRESS NOTES
SUBJECTIVE:                                                    Kayla Lugo is a 68 year old female who presents to clinic today for the following health issues:      Diabetes Follow-up      Patient is checking blood sugars: up to TID    Diabetic concerns: other - am BG readings elevated slightly     Symptoms of hypoglycemia (low blood sugar): no lows     Paresthesias (numbness or burning in feet) or sores: No     Date of last diabetic eye exam: 11/2017    BP Readings from Last 2 Encounters:   08/10/18 134/74   06/08/18 138/76     Hemoglobin A1C (%)   Date Value   05/21/2018 7.1 (H)   11/20/2017 6.3 (H)     LDL Cholesterol Calculated (mg/dL)   Date Value   05/21/2018 61   11/20/2017 56       Diabetes Management Resources       Hyperlipidemia Follow-Up      Rate your low fat/cholesterol diet?: not monitoring fat    Taking statin?  Yes, no muscle aches from statin    Other lipid medications/supplements?:  none    Hypertension Follow-up      Outpatient blood pressures are not being checked.    Low Salt Diet: not monitoring salt    Hypothyroidism Follow-up      Since last visit, patient describes the following symptoms: constipation      Amount of exercise or physical activity: 4-5 days/week for an average of 30-45 minutes    Problems taking medications regularly: No    Medication side effects: none    Diet: regular (no restrictions)        Problem list and histories reviewed & adjusted, as indicated.  Additional history: as documented    Patient Active Problem List   Diagnosis     Essential hypertension     Hypothyroidism     Esophageal reflux     Osteoarthrosis, unspecified whether generalized or localized, involving lower leg     Eczema     Hearing loss     Advanced care planning/counseling discussion     Skin lesion of face     Basal cell carcinoma of upper lip     S/P excision of skin lesion, follow-up exam     Diabetes mellitus, type 2 (H)     History of labyrinthitis     Hyperlipidemia, unspecified  hyperlipidemia type     Past Surgical History:   Procedure Laterality Date     ARTHROSCOPY KNEE  2001    RT      COLONOSCOPY  2004     COLONOSCOPY  6/13/12    Dr. Gorman; 5 year recall given     COLONOSCOPY  07/11/2017     colonoscopy with polypectomy  2007    repeat in five      DILATION AND CURETTAGE  1996     EXCISE LESION LIP Right 9/16/2014    Procedure: EXCISE LESION LIP;  Surgeon: Bri Floers MD;  Location: [facility]     ORTHOPEDIC SURGERY Right 10-5-2015    right shoulder arthoscopy RCR     NADIRA with BSO  1997       Social History   Substance Use Topics     Smoking status: Never Smoker     Smokeless tobacco: Never Used     Alcohol use No     Family History   Problem Relation Age of Onset     Alcohol/Drug Father      alcoholism     C.A.D. Father      (cause of death)      Cancer Mother      basal cell     Ovarian Cancer Mother      Alcohol/Drug Son      alcoholism      C.A.D. Son      Diabetes Brother      Diabetes Maternal Grandmother          Current Outpatient Prescriptions   Medication Sig Dispense Refill     atorvastatin (LIPITOR) 10 MG tablet Take 1 tablet (10 mg) by mouth daily 90 tablet 3     blood glucose calibration (ACCU-CHEK SMARTVIEW CONTROL) solution Use to calibrate blood glucose monitor as directed. 1 Bottle 3     blood glucose monitoring (ACCU-CHEK FASTCLIX) lancets Use to test blood sugar one time daily or as directed. 102 each 6     blood glucose monitoring (ACCU-CHEK DESTINI SMARTVIEW) meter device kit Use to test blood sugar one time daily. 1 kit 0     blood glucose monitoring (ACCU-CHEK SMARTVIEW) test strip Use to test blood sugar one time daily. 100 strip 6     cholecalciferol (VITAMIN D3) 1000 UNIT tablet Take 4 tablets by mouth daily.       ibuprofen (ADVIL,MOTRIN) 600 MG tablet Take 1 tablet (600 mg) by mouth every 6 hours as needed for moderate pain 90 tablet 5     levothyroxine (SYNTHROID/LEVOTHROID) 100 MCG tablet Take 1 tablet (100 mcg) by mouth daily 90 tablet 3      "metFORMIN (GLUCOPHAGE-XR) 500 MG 24 hr tablet Take 1 tablet (500 mg) by mouth 2 times daily (with meals) 180 tablet 3     polyethylene glycol (MIRALAX/GLYCOLAX) powder TAKE 17 GRAMS BY MOUTH DAILY 510 g 0     Allergies   Allergen Reactions     Hydrochlorothiazide Rash     Atenolol Other (See Comments)     Bradycardia      Lisinopril Cough       ROS:  Constitutional, HEENT, cardiovascular, pulmonary, gi and gu systems are negative, except as otherwise noted.    OBJECTIVE:     /74 (BP Location: Left arm, Patient Position: Sitting, Cuff Size: Adult Regular)  Pulse 66  Temp 96.3  F (35.7  C) (Tympanic)  Resp 18  Ht 5' 5\" (1.651 m)  Wt 198 lb 3.2 oz (89.9 kg)  SpO2 98%  BMI 32.98 kg/m2  Body mass index is 32.98 kg/(m^2).  GENERAL: healthy, alert and no distress  RESP: lungs clear to auscultation - no rales, rhonchi or wheezes  CV: regular rates and rhythm, normal S1 S2, no S3 or S4 and no murmur, click or rub  PSYCH: mentation appears normal, affect normal/bright  Diabetic foot exam: normal DP and PT pulses, no trophic changes or ulcerative lesions and normal monofilament exam    Diagnostic Test Results:  none     ASSESSMENT/PLAN:     Diabetes Type II, A1c Controlled, non-insulin dependent   Associated with the following complications    Renal Complications:  None    Ophthalmologic Complications: None    Neurologic Complications: None    Peripheral Vascular Complications:  None    Other: None   Plan:  Labs:  See orders      Hyperlipidemia; controlled   Plan:  Labs:   Lipid and ALT    Hypertension; controlled   Associated with the following complications:    Diabetes   Plan:  Labs:   BMP    Hypothyroidism; controlled/euthyroid   Plan:  Labs:  None due        ICD-10-CM    1. Type 2 diabetes mellitus without complication, without long-term current use of insulin (H) E11.9 Albumin Random Urine Quantitative with Creat Ratio     Hemoglobin A1c     Estimated Average Glucose   2. Hyperlipidemia, unspecified " hyperlipidemia type E78.5 ALT     Lipid Profile   3. Essential hypertension I10 Basic metabolic panel   4. Hypothyroidism, unspecified type E03.9    5. Basal cell carcinoma of upper lip C44.01 DERMATOLOGY REFERRAL       FUTURE APPOINTMENTS:       - Follow-up visit in 3-6 months, sooner as needed.      Natalia Starr MD  [facility] (formerly [facility])

## 2018-08-10 NOTE — MR AVS SNAPSHOT
After Visit Summary   8/10/2018    Kayla Lugo    MRN: [id number]           Patient Information     Date Of Birth          1950        Visit Information        Provider Department      8/10/2018 9:00 AM Natalia Starr MD [facility]        Today's Diagnoses     Type 2 diabetes mellitus without complication, without long-term current use of insulin (H)    -  1    Hyperlipidemia, unspecified hyperlipidemia type        Essential hypertension        Hypothyroidism, unspecified type        Basal cell carcinoma of upper lip           Follow-ups after your visit        Additional Services     DERMATOLOGY REFERRAL       Your provider has referred you to: [facility] if can be seen sooner than December    Please be aware that coverage of these services is subject to the terms and limitations of your health insurance plan.  Call member services at your health plan with any benefit or coverage questions.      Please bring the following with you to your appointment:    (1) Any X-Rays, CTs or MRIs which have been performed.  Contact the facility where they were done to arrange for  prior to your scheduled appointment.    (2) List of current medications  (3) This referral request   (4) Any documents/labs given to you for this referral                  Follow-up notes from your care team     Return in about 6 months (around 2/10/2019).      Who to contact     If you have questions or need follow up information about today's clinic visit or your schedule please contact [facility] directly at [phone].  Normal or non-critical lab and imaging results will be communicated to you by MyChart, letter or phone within 4 business days after the clinic has received the results. If you do not hear from us within 7 days, please contact the clinic through MyChart or phone. If you have a critical or abnormal lab result, we will notify you by phone as soon as possible.  Submit  "refill requests through [employer] or call your pharmacy and they will forward the refill request to us. Please allow 3 business days for your refill to be completed.          Additional Information About Your Visit        [employer] Information     [employer] gives you secure access to your electronic health record. If you see a primary care provider, you can also send messages to your care team and make appointments. If you have questions, please call your primary care clinic.  If you do not have a primary care provider, please call [phone] and they will assist you.        Care EveryWhere ID     This is your Care EveryWhere ID. This could be used by other organizations to access your [facility] medical records  [id number]        Your Vitals Were     Pulse Temperature Respirations Height Pulse Oximetry BMI (Body Mass Index)    66 96.3  F (35.7  C) (Tympanic) 18 5' 5\" (1.651 m) 98% 32.98 kg/m2       Blood Pressure from Last 3 Encounters:   08/10/18 134/74   06/08/18 138/76   05/21/18 146/84    Weight from Last 3 Encounters:   08/10/18 198 lb 3.2 oz (89.9 kg)   06/08/18 204 lb 12.8 oz (92.9 kg)   05/21/18 204 lb (92.5 kg)              We Performed the Following     Albumin Random Urine Quantitative with Creat Ratio     ALT     Basic metabolic panel     DERMATOLOGY REFERRAL     Estimated Average Glucose     Hemoglobin A1c     Lipid Profile        Primary Care Provider Office Phone # Fax #    Natalia FERCHO Starr [phone] [facility] [id number]        Equal Access to Services     HUMPHREY ANTUNEZ : Hadii aad ku hadasho Soomaali, waaxda luqadaha, qaybta kaalmada adeegyada, susana renner. So [facility] [phone].    ATENCIÓN: Si habla español, tiene a cordero disposición servicios gratuitos de asistencia lingüística. Llame al [phone].    We comply with applicable federal civil rights laws and [place] laws. We do not discriminate on the basis of race, " color, national origin, age, disability, sex, sexual orientation, or gender identity.            Thank you!     Thank you for choosing [facility]  for your care. Our goal is always to provide you with excellent care. Hearing back from our patients is one way we can continue to improve our services. Please take a few minutes to complete the written survey that you may receive in the mail after your visit with us. Thank you!             Your Updated Medication List - Protect others around you: Learn how to safely use, store and throw away your medicines at www.disposemymeds.org.          This list is accurate as of 8/10/18 11:59 PM.  Always use your most recent med list.                   Brand Name Dispense Instructions for use Diagnosis    atorvastatin 10 MG tablet    LIPITOR    90 tablet    Take 1 tablet (10 mg) by mouth daily    Hyperlipidemia, unspecified hyperlipidemia type       blood glucose calibration solution     1 Bottle    Use to calibrate blood glucose monitor as directed.    Type 2 diabetes mellitus without complication, without long-term current use of insulin (H)       blood glucose monitoring lancets     102 each    Use to test blood sugar one time daily or as directed.    Type 2 diabetes mellitus without complication, without long-term current use of insulin (H)       blood glucose monitoring meter device kit     1 kit    Use to test blood sugar one time daily.    Type 2 diabetes mellitus without complication, without long-term current use of insulin (H)       blood glucose monitoring test strip    ACCU-CHEK SMARTVIEW    100 strip    Use to test blood sugar one time daily.    Type 2 diabetes mellitus without complication, without long-term current use of insulin (H)       cholecalciferol 1000 UNIT tablet    vitamin D3     Take 4 tablets by mouth daily.        ibuprofen 600 MG tablet    ADVIL/MOTRIN    90 tablet    Take 1 tablet (600 mg) by mouth every 6 hours as needed for moderate  pain    Back pain       levothyroxine 100 MCG tablet    SYNTHROID/LEVOTHROID    90 tablet    Take 1 tablet (100 mcg) by mouth daily    Hypothyroidism, unspecified type       polyethylene glycol powder    MIRALAX/GLYCOLAX    510 g    TAKE 17 GRAMS BY MOUTH DAILY    Constipation, unspecified constipation type

## 2018-08-11 ASSESSMENT — PATIENT HEALTH QUESTIONNAIRE - PHQ9: SUM OF ALL RESPONSES TO PHQ QUESTIONS 1-9: 0

## 2018-08-11 ASSESSMENT — ANXIETY QUESTIONNAIRES: GAD7 TOTAL SCORE: 5

## 2018-08-13 DIAGNOSIS — E11.9 TYPE 2 DIABETES MELLITUS WITHOUT COMPLICATION, WITHOUT LONG-TERM CURRENT USE OF INSULIN (H): ICD-10-CM

## 2018-08-13 RX ORDER — METFORMIN HCL 500 MG
TABLET, EXTENDED RELEASE 24 HR ORAL
Qty: 270 TABLET | Refills: 3 | Status: SHIPPED | OUTPATIENT
Start: 2018-08-13 | End: 2019-08-06

## 2018-08-17 DIAGNOSIS — K59.00 CONSTIPATION, UNSPECIFIED CONSTIPATION TYPE: ICD-10-CM

## 2018-08-17 RX ORDER — POLYETHYLENE GLYCOL 3350 17 G/17G
POWDER, FOR SOLUTION ORAL
Qty: 510 G | Refills: 2 | Status: SHIPPED | OUTPATIENT
Start: 2018-08-17 | End: 2021-06-29

## 2018-08-17 NOTE — TELEPHONE ENCOUNTER
Miralax  Last Written Prescription Date: 2/21/18  Last Fill Quantity: 510g # of Refills: 0  Last Office Visit: 8/10/18

## 2018-08-20 ENCOUNTER — OFFICE VISIT (OUTPATIENT)
Dept: DERMATOLOGY | Facility: OTHER | Age: 68
End: 2018-08-20
Attending: FAMILY MEDICINE
Payer: MEDICARE

## 2018-08-20 VITALS
SYSTOLIC BLOOD PRESSURE: 126 MMHG | HEART RATE: 70 BPM | TEMPERATURE: 97.8 F | DIASTOLIC BLOOD PRESSURE: 80 MMHG | HEIGHT: 65 IN | OXYGEN SATURATION: 98 % | WEIGHT: 198 LBS | BODY MASS INDEX: 32.99 KG/M2

## 2018-08-20 DIAGNOSIS — C44.01 BASAL CELL CARCINOMA OF UPPER LIP: ICD-10-CM

## 2018-08-20 PROCEDURE — 99202 OFFICE O/P NEW SF 15 MIN: CPT | Performed by: DERMATOLOGY

## 2018-08-20 PROCEDURE — G0463 HOSPITAL OUTPT CLINIC VISIT: HCPCS

## 2018-08-20 RX ORDER — ADAPALENE 0.1 G/100G
CREAM TOPICAL AT BEDTIME
COMMUNITY
End: 2018-11-20

## 2018-08-20 RX ORDER — TRIAMCINOLONE ACETONIDE 1 MG/G
OINTMENT TOPICAL 2 TIMES DAILY
COMMUNITY
End: 2023-01-16

## 2018-08-20 RX ORDER — FLUOCINONIDE 0.5 MG/G
OINTMENT TOPICAL 2 TIMES DAILY
COMMUNITY
End: 2022-09-01

## 2018-08-20 ASSESSMENT — PAIN SCALES - GENERAL: PAINLEVEL: NO PAIN (0)

## 2018-08-20 NOTE — CONSULTS
Consult Date:  2018      SUBJECTIVE:  Soni comes in for the first time for a general skin check.  She had a basal cell carcinoma removed from the upper lip some years back in [place].  She has been seeing Oralia Pascual PA-C, at the [facility] in [place].  She would like a general upper body skin exam  today.      OBJECTIVE:  Shows a pleasant and healthy lady in no distress.  I checked her scalp, her face, her neck, her upper chest, her back, her arms.  She did not feel there was anything below the waist so we did not check there.  We did not check the breasts.      Findings included a scar on her right upper lip that is very presentable, flat and does not deform her nose.  She had some scattered nevi, a few minor seborrheic keratoses on her back, as were present some cherry angiomata.  I found no actinic keratoses.  I found no lesions that suggested any of the common  types of skin cancer.        ASSESSMENT:  A past history of skin cancer of the basal cell type, otherwise healthy skin in a 68-year-old woman.      PLAN:  Reassured.  Return again in 1 year for a skin check.  Call should she notice any new lesions prior to the yearly visit date.         JELANI CALL MD             D: 2018   T: 2018   MT: COLE      Name:     SONI LOCKWOOD   MRN:      [id number]        Account:       [id number]   :      1950           Consult Date:  2018      Document: [id number]       cc: Natalia Starr MD

## 2018-08-20 NOTE — NURSING NOTE
"Chief Complaint   Patient presents with     Skin Check     NPT  History of BCC upper lip       Initial /80  Pulse 70  Temp 97.8  F (36.6  C) (Tympanic)  Ht 1.651 m (5' 5\")  Wt 89.8 kg (198 lb)  SpO2 98%  BMI 32.95 kg/m2 Estimated body mass index is 32.95 kg/(m^2) as calculated from the following:    Height as of this encounter: 1.651 m (5' 5\").    Weight as of this encounter: 89.8 kg (198 lb).  Medication Reconciliation: complete    Nery Watson LPN    "

## 2018-08-20 NOTE — MR AVS SNAPSHOT
After Visit Summary   8/20/2018    Kayla Lugo    MRN: [id number]           Patient Information     Date Of Birth          1950        Visit Information        Provider Department      8/20/2018 3:45 PM JELANI Amezquita MD [facility]        Today's Diagnoses     Basal cell carcinoma of upper lip           Follow-ups after your visit        Your next 10 appointments already scheduled     Nov 15, 2018 11:00 AM CST   (Arrive by 10:45 AM)   SHORT with Natalia Starr MD   [facility] ([facility] )    [address]  [facility] [address]   [phone]              Who to contact     If you have questions or need follow up information about today's clinic visit or your schedule please contact [facility] directly at [phone].  Normal or non-critical lab and imaging results will be communicated to you by MyChart, letter or phone within 4 business days after the clinic has received the results. If you do not hear from us within 7 days, please contact the clinic through MyChart or phone. If you have a critical or abnormal lab result, we will notify you by phone as soon as possible.  Submit refill requests through Relayware or call your pharmacy and they will forward the refill request to us. Please allow 3 business days for your refill to be completed.          Additional Information About Your Visit        MyChart Information     Relayware gives you secure access to your electronic health record. If you see a primary care provider, you can also send messages to your care team and make appointments. If you have questions, please call your primary care clinic.  If you do not have a primary care provider, please call [phone] and they will assist you.        Care EveryWhere ID     This is your Care EveryWhere ID. This could be used by other organizations to access your [facility] medical records  [id number]        Your  "Vitals Were     Pulse Temperature Height Pulse Oximetry BMI (Body Mass Index)       70 97.8  F (36.6  C) (Tympanic) 1.651 m (5' 5\") 98% 32.95 kg/m2        Blood Pressure from Last 3 Encounters:   08/20/18 126/80   08/10/18 134/74   06/08/18 138/76    Weight from Last 3 Encounters:   08/20/18 89.8 kg (198 lb)   08/10/18 89.9 kg (198 lb 3.2 oz)   06/08/18 92.9 kg (204 lb 12.8 oz)              Today, you had the following     No orders found for display       Primary Care Provider Office Phone # Fax #    Natalia Starr [phone] [address] [facility] [address]        Equal Access to Services     HUMPHREY ANTUNEZ : Megan davis Soaustyn, waaxda luqadaha, qaybta kaalmalandry weiss, susana lee . So [facility] [phone].    ATENCIÓN: Si habla español, tiene a cordero disposición servicios gratuitos de asistencia lingüística. Shaggy al [phone].    We comply with applicable federal civil rights laws and [place] laws. We do not discriminate on the basis of race, color, national origin, age, disability, sex, sexual orientation, or gender identity.            Thank you!     Thank you for choosing [facility]  for your care. Our goal is always to provide you with excellent care. Hearing back from our patients is one way we can continue to improve our services. Please take a few minutes to complete the written survey that you may receive in the mail after your visit with us. Thank you!             Your Updated Medication List - Protect others around you: Learn how to safely use, store and throw away your medicines at www.disposemymeds.org.          This list is accurate as of 8/20/18  5:01 PM.  Always use your most recent med list.                   Brand Name Dispense Instructions for use Diagnosis    adapalene 0.1 % cream    DIFFERIN     Apply topically At Bedtime        atorvastatin 10 MG tablet    LIPITOR    90 tablet    Take 1 tablet " (10 mg) by mouth daily    Hyperlipidemia, unspecified hyperlipidemia type       blood glucose calibration solution     1 Bottle    Use to calibrate blood glucose monitor as directed.    Type 2 diabetes mellitus without complication, without long-term current use of insulin (H)       blood glucose monitoring lancets     102 each    Use to test blood sugar one time daily or as directed.    Type 2 diabetes mellitus without complication, without long-term current use of insulin (H)       blood glucose monitoring meter device kit     1 kit    Use to test blood sugar one time daily.    Type 2 diabetes mellitus without complication, without long-term current use of insulin (H)       blood glucose monitoring test strip    ACCU-CHEK SMARTVIEW    100 strip    Use to test blood sugar one time daily.    Type 2 diabetes mellitus without complication, without long-term current use of insulin (H)       cholecalciferol 1000 UNIT tablet    vitamin D3     Take 4 tablets by mouth daily.        fluocinonide 0.05 % ointment    LIDEX     Apply topically 2 times daily        ibuprofen 600 MG tablet    ADVIL/MOTRIN    90 tablet    Take 1 tablet (600 mg) by mouth every 6 hours as needed for moderate pain    Back pain       levothyroxine 100 MCG tablet    SYNTHROID/LEVOTHROID    90 tablet    Take 1 tablet (100 mcg) by mouth daily    Hypothyroidism, unspecified type       metFORMIN 500 MG 24 hr tablet    GLUCOPHAGE-XR    270 tablet    Take 500 mg PO in the morning and 1000 mg PO at night    Type 2 diabetes mellitus without complication, without long-term current use of insulin (H)       polyethylene glycol powder    MIRALAX/GLYCOLAX    510 g    TAKE 17 GRAMS BY MOUTH DAILY    Constipation, unspecified constipation type       triamcinolone 0.1 % ointment    KENALOG     Apply topically 2 times daily

## 2018-08-20 NOTE — LETTER
8/20/2018       RE: Kayla Lugo  [address]  [facility] [address]     Dear Colleague,    Thank you for referring your patient, Kayla Lugo, to the [facility] [place] [facility]. Please see a copy of my visit note below.    dictated    Again, thank you for allowing me to participate in the care of your patient.      Sincerely,    JELANI Amezquita MD

## 2018-10-09 ENCOUNTER — ALLIED HEALTH/NURSE VISIT (OUTPATIENT)
Dept: FAMILY MEDICINE | Facility: OTHER | Age: 68
End: 2018-10-09
Attending: FAMILY MEDICINE
Payer: MEDICARE

## 2018-10-09 DIAGNOSIS — Z23 NEED FOR PROPHYLACTIC VACCINATION AND INOCULATION AGAINST INFLUENZA: Primary | ICD-10-CM

## 2018-10-09 PROCEDURE — G0008 ADMIN INFLUENZA VIRUS VAC: HCPCS

## 2018-10-09 PROCEDURE — 90662 IIV NO PRSV INCREASED AG IM: CPT

## 2018-10-09 NOTE — MR AVS SNAPSHOT
After Visit Summary   10/9/2018    Kayla Lugo    MRN: [id number]           Patient Information     Date Of Birth          1950        Visit Information        Provider Department      10/9/2018 9:15 AM [facility] NURSE [facility]        Today's Diagnoses     Need for prophylactic vaccination and inoculation against influenza    -  1       Follow-ups after your visit        Your next 10 appointments already scheduled     Oct 09, 2018  9:15 AM CDT   (Arrive by 9:00 AM)   Nurse Only with [facility] NURSE   [facility] ([facility] )    [address]  [place] [address]   [phone]            Nov 15, 2018 11:00 AM CST   (Arrive by 10:45 AM)   SHORT with Natalia Starr MD   [facility] ([facility] )    [address]  [place] [address]   [phone]              Who to contact     If you have questions or need follow up information about today's clinic visit or your schedule please contact [facility] directly at [phone].  Normal or non-critical lab and imaging results will be communicated to you by Zenefitshart, letter or phone within 4 business days after the clinic has received the results. If you do not hear from us within 7 days, please contact the clinic through Zenefitshart or phone. If you have a critical or abnormal lab result, we will notify you by phone as soon as possible.  Submit refill requests through Assistera or call your pharmacy and they will forward the refill request to us. Please allow 3 business days for your refill to be completed.          Additional Information About Your Visit        MyChart Information     [employer] gives you secure access to your electronic health record. If you see a primary care provider, you can also send messages to your care team and make appointments. If you have questions, please call your  primary care clinic.  If you do not have a primary care provider, please call [phone] and they will assist you.        Care EveryWhere ID     This is your Care EveryWhere ID. This could be used by other organizations to access your [place] [facility] medical records  [id number]         Blood Pressure from Last 3 Encounters:   08/20/18 126/80   08/10/18 134/74   06/08/18 138/76    Weight from Last 3 Encounters:   08/20/18 198 lb (89.8 kg)   08/10/18 198 lb 3.2 oz (89.9 kg)   06/08/18 204 lb 12.8 oz (92.9 kg)              We Performed the Following     ADMIN INFLUENZA (For MEDICARE Patients ONLY) []     HC FLU VACCINE, INCREASED ANTIGEN, PRESV FREE        Primary Care Provider Office Phone # Fax #    Natalia Starr [phone] [address] [facility] [address]        Equal Access to Services     ALYSSA ANTUNEZ : Hadii noe ku hadasho Soomaali, waaxda luqadaha, qaybta kaalmada adeegyada, susana lee . So [facility] [phone].    ATENCIÓN: Si habla español, tiene a cordero disposición servicios gratuitos de asistencia lingüística. Llame al [phone].    We comply with applicable federal civil rights laws and [place] laws. We do not discriminate on the basis of race, color, national origin, age, disability, sex, sexual orientation, or gender identity.            Thank you!     Thank you for choosing [facility]  for your care. Our goal is always to provide you with excellent care. Hearing back from our patients is one way we can continue to improve our services. Please take a few minutes to complete the written survey that you may receive in the mail after your visit with us. Thank you!             Your Updated Medication List - Protect others around you: Learn how to safely use, store and throw away your medicines at www.disposemymeds.org.          This list is accurate as of 10/9/18  9:12 AM.  Always use your most recent med list.                    Brand Name Dispense Instructions for use Diagnosis    adapalene 0.1 % cream    DIFFERIN     Apply topically At Bedtime        atorvastatin 10 MG tablet    LIPITOR    90 tablet    Take 1 tablet (10 mg) by mouth daily    Hyperlipidemia, unspecified hyperlipidemia type       blood glucose calibration solution     1 Bottle    Use to calibrate blood glucose monitor as directed.    Type 2 diabetes mellitus without complication, without long-term current use of insulin (H)       blood glucose monitoring lancets     102 each    Use to test blood sugar one time daily or as directed.    Type 2 diabetes mellitus without complication, without long-term current use of insulin (H)       blood glucose monitoring meter device kit     1 kit    Use to test blood sugar one time daily.    Type 2 diabetes mellitus without complication, without long-term current use of insulin (H)       blood glucose monitoring test strip    ACCU-CHEK SMARTVIEW    100 strip    Use to test blood sugar one time daily.    Type 2 diabetes mellitus without complication, without long-term current use of insulin (H)       cholecalciferol 1000 UNIT tablet    vitamin D3     Take 4 tablets by mouth daily.        fluocinonide 0.05 % ointment    LIDEX     Apply topically 2 times daily        ibuprofen 600 MG tablet    ADVIL/MOTRIN    90 tablet    Take 1 tablet (600 mg) by mouth every 6 hours as needed for moderate pain    Back pain       levothyroxine 100 MCG tablet    SYNTHROID/LEVOTHROID    90 tablet    Take 1 tablet (100 mcg) by mouth daily    Hypothyroidism, unspecified type       metFORMIN 500 MG 24 hr tablet    GLUCOPHAGE-XR    270 tablet    Take 500 mg PO in the morning and 1000 mg PO at night    Type 2 diabetes mellitus without complication, without long-term current use of insulin (H)       polyethylene glycol powder    MIRALAX/GLYCOLAX    510 g    TAKE 17 GRAMS BY MOUTH DAILY    Constipation, unspecified constipation type        triamcinolone 0.1 % ointment    KENALOG     Apply topically 2 times daily

## 2018-10-09 NOTE — PROGRESS NOTES
Injectable Influenza Immunization Documentation    1.  Is the person to be vaccinated sick today?   No    2. Does the person to be vaccinated have an allergy to a component   of the vaccine?   No  Egg Allergy Algorithm Link    3. Has the person to be vaccinated ever had a serious reaction   to influenza vaccine in the past?   No    4. Has the person to be vaccinated ever had Guillain-Barré syndrome?   No    Form completed by Pamela M Lechevalier LPN             Injectable Influenza Immunization Documentation    1.  Is the person to be vaccinated sick today?   No    2. Does the person to be vaccinated have an allergy to a component   of the vaccine?   No  Egg Allergy Algorithm Link    3. Has the person to be vaccinated ever had a serious reaction   to influenza vaccine in the past?   No    4. Has the person to be vaccinated ever had Guillain-Barré syndrome?   No    Form completed by Pamela M Lechevalier LPN

## 2018-11-08 ENCOUNTER — TRANSFERRED RECORDS (OUTPATIENT)
Dept: HEALTH INFORMATION MANAGEMENT | Facility: CLINIC | Age: 68
End: 2018-11-08

## 2018-11-12 DIAGNOSIS — E78.5 HYPERLIPIDEMIA, UNSPECIFIED HYPERLIPIDEMIA TYPE: ICD-10-CM

## 2018-11-12 RX ORDER — ATORVASTATIN CALCIUM 10 MG/1
TABLET, FILM COATED ORAL
Qty: 90 TABLET | Refills: 3 | Status: SHIPPED | OUTPATIENT
Start: 2018-11-12 | End: 2019-11-01

## 2018-11-16 NOTE — PROGRESS NOTES
SUBJECTIVE:                                                    Kayla Lugo is a 68 year old female who presents to clinic today for the following health issues:      Diabetes Follow-up      Patient is checking blood sugars: rarely.  Results range in the 130's    Diabetic concerns: None     Symptoms of hypoglycemia (low blood sugar): none     Paresthesias (numbness or burning in feet) or sores: No     Date of last diabetic eye exam: 11/09/18    Diabetes Management Resources    Hyperlipidemia Follow-Up      Rate your low fat/cholesterol diet?: not monitoring fat    Taking statin?  Yes, no muscle aches from statin    Other lipid medications/supplements?:  none    Hypertension Follow-up      Outpatient blood pressures are not being checked.    Low Salt Diet: no added salt    BP Readings from Last 2 Encounters:   11/20/18 128/78   08/20/18 126/80     Hemoglobin A1C (%)   Date Value   08/10/2018 7.0 (H)   05/21/2018 7.1 (H)     LDL Cholesterol Calculated (mg/dL)   Date Value   08/10/2018 68   05/21/2018 61     Hypothyroidism Follow-up      Since last visit, patient describes the following symptoms: Weight stable, no hair loss, no skin changes, no constipation, no loose stools.     Patient reports feeling her thyroid when she lays back.      Amount of exercise or physical activity: None    Problems taking medications regularly: No    Medication side effects: none    Diet: low salt        Musculoskeletal problem/pain      Duration: Left knee pain    Description  Location: 1 month    Intensity:  8-9/10    Accompanying signs and symptoms: swelling    History  Previous similar problem: no   Previous evaluation:  none    Precipitating or alleviating factors:  Trauma or overuse: no   Aggravating factors include: climbing stairs and laying down and sitting    Therapies tried and outcome: Ibuprofen      Patient also needs a refill of Differin.      Problem list and histories reviewed & adjusted, as indicated.  Additional  history: as documented    Patient Active Problem List   Diagnosis     Essential hypertension     Hypothyroidism     Esophageal reflux     Osteoarthrosis, unspecified whether generalized or localized, involving lower leg     Eczema     Hearing loss     Advanced care planning/counseling discussion     Skin lesion of face     Basal cell carcinoma of upper lip     S/P excision of skin lesion, follow-up exam     Diabetes mellitus, type 2 (H)     History of labyrinthitis     Hyperlipidemia, unspecified hyperlipidemia type     Past Surgical History:   Procedure Laterality Date     ARTHROSCOPY KNEE  2001    RT      COLONOSCOPY  2004     COLONOSCOPY  6/13/12    Dr. Gorman; 5 year recall given     COLONOSCOPY  07/11/2017     colonoscopy with polypectomy  2007    repeat in five      DILATION AND CURETTAGE  1996     EXCISE LESION LIP Right 9/16/2014    Procedure: EXCISE LESION LIP;  Surgeon: Bri Flores MD;  Location: [facility] OR     ORTHOPEDIC SURGERY Right 10-5-2015    right shoulder arthoscopy [facility]     NADIRA with BSO  1997       Social History   Substance Use Topics     Smoking status: Never Smoker     Smokeless tobacco: Never Used     Alcohol use No     Family History   Problem Relation Age of Onset     Alcohol/Drug Father      alcoholism     C.A.D. Father      (cause of death)      Cancer Mother      basal cell     Ovarian Cancer Mother      Alcohol/Drug Son      alcoholism      C.A.D. Son      Diabetes Brother      Diabetes Maternal Grandmother          Current Outpatient Prescriptions   Medication Sig Dispense Refill     adapalene (DIFFERIN) 0.1 % cream Apply topically At Bedtime 45 g 1     atorvastatin (LIPITOR) 10 MG tablet TAKE 1 TABLET(10 MG) BY MOUTH DAILY 90 tablet 3     blood glucose calibration (ACCU-CHEK SMARTVIEW CONTROL) solution Use to calibrate blood glucose monitor as directed. 1 Bottle 3     blood glucose monitoring (ACCU-CHEK FASTCLIX) lancets Use to test blood sugar one time daily or as directed. 102  "each 6     blood glucose monitoring (ACCU-CHEK DESTINI SMARTVIEW) meter device kit Use to test blood sugar one time daily. 1 kit 0     blood glucose monitoring (ACCU-CHEK SMARTVIEW) test strip Use to test blood sugar one time daily. 100 strip 6     cholecalciferol (VITAMIN D3) 1000 UNIT tablet Take 4 tablets by mouth daily.       fluocinonide (LIDEX) 0.05 % ointment Apply topically 2 times daily       ibuprofen (ADVIL/MOTRIN) 600 MG tablet Take 1 tablet (600 mg) by mouth every 6 hours as needed for moderate pain 90 tablet 1     levothyroxine (SYNTHROID/LEVOTHROID) 100 MCG tablet Take 1 tablet (100 mcg) by mouth daily 90 tablet 3     metFORMIN (GLUCOPHAGE-XR) 500 MG 24 hr tablet Take 500 mg PO in the morning and 1000 mg PO at night 270 tablet 3     polyethylene glycol (MIRALAX/GLYCOLAX) powder TAKE 17 GRAMS BY MOUTH DAILY 510 g 2     triamcinolone (KENALOG) 0.1 % ointment Apply topically 2 times daily       Allergies   Allergen Reactions     Hydrochlorothiazide Rash     Atenolol Other (See Comments)     Bradycardia      Lisinopril Cough       ROS:  Constitutional, HEENT, cardiovascular, pulmonary, gi and gu systems are negative, except as otherwise noted.    OBJECTIVE:     /78 (BP Location: Right arm, Patient Position: Sitting, Cuff Size: Adult Large)  Pulse 72  Temp 98.2  F (36.8  C) (Tympanic)  Resp 14  Ht 5' 5\" (1.651 m)  Wt 200 lb (90.7 kg)  SpO2 98%  BMI 33.28 kg/m2  Body mass index is 33.28 kg/(m^2).  GENERAL: healthy, alert and no distress  MS: pain in area of pes anserine bursa  PSYCH: mentation appears normal, affect normal/bright    Diagnostic Test Results:  Results for orders placed or performed in visit on 11/20/18 (from the past 24 hour(s))   XR Knee Left 3 Views    Narrative    PROCEDURE:  XR KNEE LT 3 VW    HISTORY: ; Acute pain of left knee    COMPARISON:  None.    TECHNIQUE:  3 views of the left knee were obtained.    FINDINGS:  There is severe osteoarthritic changes at the  patellofemoral " articulation. Mild degenerative changes are seen at the  medial lateral femoral tibial articulations. The some calcification  seen overlying the knee joint possibly intra-articular loose bodies.  Is a small knee effusion. The distal femur proximal tibia and fibula  are intact.       Impression    IMPRESSION: Severe osteoarthritic changes of the patellofemoral  articulation with possible intra-articular loose bodies      BHARTI CONTRERAS MD       ASSESSMENT/PLAN:     Diabetes Type II, A1c Controlled, non-insulin dependent   Associated with the following complications    Renal Complications:  None    Ophthalmologic Complications: None    Neurologic Complications: None    Peripheral Vascular Complications:  None    Other: None   Plan:  Labs:  See orders      Hyperlipidemia; controlled   Plan:  Labs:   Lipid and ALT    Hypertension; controlled   Associated with the following complications:    Diabetes   Plan:  Labs:   BMP    Hypothyroidism; controlled/euthyroid   Plan:  Labs:  TSH and Free T4            ICD-10-CM    1. Type 2 diabetes mellitus without complication, without long-term current use of insulin (H) E11.9 Hemoglobin A1c   2. Hyperlipidemia, unspecified hyperlipidemia type E78.5 ALT     Lipid Profile   3. Essential hypertension I10 Basic metabolic panel   4. Hypothyroidism, unspecified type E03.9 TSH with free T4 reflex   5. Acute pain of left knee M25.562 ibuprofen (ADVIL/MOTRIN) 600 MG tablet     XR Knee Left 3 Views   6. Skin texture changes R23.4 adapalene (DIFFERIN) 0.1 % cream       FUTURE APPOINTMENTS:       - Follow-up visit in 3-6 months, depending on labs.      Natalia Starr MD  [facility]

## 2018-11-20 ENCOUNTER — OFFICE VISIT (OUTPATIENT)
Dept: FAMILY MEDICINE | Facility: OTHER | Age: 68
End: 2018-11-20
Attending: FAMILY MEDICINE
Payer: MEDICARE

## 2018-11-20 ENCOUNTER — RADIANT APPOINTMENT (OUTPATIENT)
Dept: GENERAL RADIOLOGY | Facility: OTHER | Age: 68
End: 2018-11-20
Attending: FAMILY MEDICINE
Payer: MEDICARE

## 2018-11-20 VITALS
OXYGEN SATURATION: 98 % | WEIGHT: 200 LBS | RESPIRATION RATE: 14 BRPM | SYSTOLIC BLOOD PRESSURE: 128 MMHG | HEIGHT: 65 IN | TEMPERATURE: 98.2 F | DIASTOLIC BLOOD PRESSURE: 78 MMHG | BODY MASS INDEX: 33.32 KG/M2 | HEART RATE: 72 BPM

## 2018-11-20 DIAGNOSIS — E03.9 HYPOTHYROIDISM, UNSPECIFIED TYPE: ICD-10-CM

## 2018-11-20 DIAGNOSIS — M25.562 ACUTE PAIN OF LEFT KNEE: ICD-10-CM

## 2018-11-20 DIAGNOSIS — E11.9 TYPE 2 DIABETES MELLITUS WITHOUT COMPLICATION, WITHOUT LONG-TERM CURRENT USE OF INSULIN (H): Primary | ICD-10-CM

## 2018-11-20 DIAGNOSIS — I10 ESSENTIAL HYPERTENSION: ICD-10-CM

## 2018-11-20 DIAGNOSIS — E78.5 HYPERLIPIDEMIA, UNSPECIFIED HYPERLIPIDEMIA TYPE: ICD-10-CM

## 2018-11-20 DIAGNOSIS — R23.4 SKIN TEXTURE CHANGES: ICD-10-CM

## 2018-11-20 LAB
ALT SERPL W P-5'-P-CCNC: 25 U/L (ref 0–50)
ANION GAP SERPL CALCULATED.3IONS-SCNC: 5 MMOL/L (ref 3–14)
BUN SERPL-MCNC: 12 MG/DL (ref 7–30)
CALCIUM SERPL-MCNC: 9.1 MG/DL (ref 8.5–10.1)
CHLORIDE SERPL-SCNC: 105 MMOL/L (ref 94–109)
CHOLEST SERPL-MCNC: 131 MG/DL
CO2 SERPL-SCNC: 30 MMOL/L (ref 20–32)
CREAT SERPL-MCNC: 0.78 MG/DL (ref 0.52–1.04)
EST. AVERAGE GLUCOSE BLD GHB EST-MCNC: 143 MG/DL
GFR SERPL CREATININE-BSD FRML MDRD: 73 ML/MIN/1.7M2
GLUCOSE SERPL-MCNC: 116 MG/DL (ref 70–99)
HBA1C MFR BLD: 6.6 % (ref 0–5.6)
HDLC SERPL-MCNC: 39 MG/DL
LDLC SERPL CALC-MCNC: 62 MG/DL
NONHDLC SERPL-MCNC: 92 MG/DL
POTASSIUM SERPL-SCNC: 4.2 MMOL/L (ref 3.4–5.3)
SODIUM SERPL-SCNC: 140 MMOL/L (ref 133–144)
TRIGL SERPL-MCNC: 151 MG/DL
TSH SERPL DL<=0.005 MIU/L-ACNC: 0.85 MU/L (ref 0.4–4)

## 2018-11-20 PROCEDURE — G0463 HOSPITAL OUTPT CLINIC VISIT: HCPCS

## 2018-11-20 PROCEDURE — 36415 COLL VENOUS BLD VENIPUNCTURE: CPT | Mod: ZL | Performed by: FAMILY MEDICINE

## 2018-11-20 PROCEDURE — 99214 OFFICE O/P EST MOD 30 MIN: CPT | Performed by: FAMILY MEDICINE

## 2018-11-20 PROCEDURE — 84460 ALANINE AMINO (ALT) (SGPT): CPT | Mod: ZL | Performed by: FAMILY MEDICINE

## 2018-11-20 PROCEDURE — 84443 ASSAY THYROID STIM HORMONE: CPT | Mod: ZL | Performed by: FAMILY MEDICINE

## 2018-11-20 PROCEDURE — 83036 HEMOGLOBIN GLYCOSYLATED A1C: CPT | Mod: ZL | Performed by: FAMILY MEDICINE

## 2018-11-20 PROCEDURE — 73562 X-RAY EXAM OF KNEE 3: CPT | Mod: TC,LT,FY

## 2018-11-20 PROCEDURE — 80048 BASIC METABOLIC PNL TOTAL CA: CPT | Mod: ZL | Performed by: FAMILY MEDICINE

## 2018-11-20 PROCEDURE — 40000788 ZZHCL STATISTIC ESTIMATED AVERAGE GLUCOSE: Mod: ZL | Performed by: FAMILY MEDICINE

## 2018-11-20 PROCEDURE — 80061 LIPID PANEL: CPT | Mod: ZL | Performed by: FAMILY MEDICINE

## 2018-11-20 RX ORDER — ADAPALENE 0.1 G/100G
CREAM TOPICAL AT BEDTIME
Qty: 45 G | Refills: 1 | Status: SHIPPED | OUTPATIENT
Start: 2018-11-20 | End: 2019-11-25

## 2018-11-20 RX ORDER — IBUPROFEN 600 MG/1
600 TABLET, FILM COATED ORAL EVERY 6 HOURS PRN
Qty: 90 TABLET | Refills: 1 | Status: SHIPPED | OUTPATIENT
Start: 2018-11-20 | End: 2020-12-01

## 2018-11-20 ASSESSMENT — ANXIETY QUESTIONNAIRES
2. NOT BEING ABLE TO STOP OR CONTROL WORRYING: SEVERAL DAYS
3. WORRYING TOO MUCH ABOUT DIFFERENT THINGS: SEVERAL DAYS
7. FEELING AFRAID AS IF SOMETHING AWFUL MIGHT HAPPEN: NOT AT ALL
5. BEING SO RESTLESS THAT IT IS HARD TO SIT STILL: NOT AT ALL
GAD7 TOTAL SCORE: 6
6. BECOMING EASILY ANNOYED OR IRRITABLE: MORE THAN HALF THE DAYS
1. FEELING NERVOUS, ANXIOUS, OR ON EDGE: MORE THAN HALF THE DAYS

## 2018-11-20 ASSESSMENT — PATIENT HEALTH QUESTIONNAIRE - PHQ9
SUM OF ALL RESPONSES TO PHQ QUESTIONS 1-9: 0
5. POOR APPETITE OR OVEREATING: NOT AT ALL

## 2018-11-20 ASSESSMENT — PAIN SCALES - GENERAL: PAINLEVEL: EXTREME PAIN (8)

## 2018-11-20 NOTE — NURSING NOTE
"Chief Complaint   Patient presents with     Lipids     Diabetes     Thyroid Problem     Knee Pain       Initial /78 (BP Location: Right arm, Patient Position: Sitting, Cuff Size: Adult Large)  Pulse 72  Temp 98.2  F (36.8  C) (Tympanic)  Resp 14  Ht 5' 5\" (1.651 m)  Wt 200 lb (90.7 kg)  SpO2 98%  BMI 33.28 kg/m2 Estimated body mass index is 33.28 kg/(m^2) as calculated from the following:    Height as of this encounter: 5' 5\" (1.651 m).    Weight as of this encounter: 200 lb (90.7 kg).  Medication Reconciliation: complete    Flor John LPN    "

## 2018-11-20 NOTE — MR AVS SNAPSHOT
After Visit Summary   11/20/2018    Kayla Lugo    MRN: [id number]           Patient Information     Date Of Birth          1950        Visit Information        Provider Department      11/20/2018 9:30 AM Natalia Starr MD [facility]        Today's Diagnoses     Type 2 diabetes mellitus without complication, without long-term current use of insulin (H)    -  1    Hyperlipidemia, unspecified hyperlipidemia type        Essential hypertension        Hypothyroidism, unspecified type        Acute pain of left knee        Skin texture changes           Follow-ups after your visit        Follow-up notes from your care team     Return in about 6 months (around 5/20/2019).      Who to contact     If you have questions or need follow up information about today's clinic visit or your schedule please contact [facility] directly at [phone].  Normal or non-critical lab and imaging results will be communicated to you by [employer], letter or phone within 4 business days after the clinic has received the results. If you do not hear from us within 7 days, please contact the clinic through [employer] or phone. If you have a critical or abnormal lab result, we will notify you by phone as soon as possible.  Submit refill requests through [employer] or call your pharmacy and they will forward the refill request to us. Please allow 3 business days for your refill to be completed.          Additional Information About Your Visit        MyChart Information     [employer] gives you secure access to your electronic health record. If you see a primary care provider, you can also send messages to your care team and make appointments. If you have questions, please call your primary care clinic.  If you do not have a primary care provider, please call [phone] and they will assist you.        Care EveryWhere ID     This is your Care EveryWhere ID. This could be used by other  "organizations to access your [facility] medical records  [id number]        Your Vitals Were     Pulse Temperature Respirations Height Pulse Oximetry BMI (Body Mass Index)    72 98.2  F (36.8  C) (Tympanic) 14 5' 5\" (1.651 m) 98% 33.28 kg/m2       Blood Pressure from Last 3 Encounters:   11/20/18 128/78   08/20/18 126/80   08/10/18 134/74    Weight from Last 3 Encounters:   11/20/18 200 lb (90.7 kg)   08/20/18 198 lb (89.8 kg)   08/10/18 198 lb 3.2 oz (89.9 kg)              We Performed the Following     ALT     Basic metabolic panel     Hemoglobin A1c     Lipid Profile     TSH with free T4 reflex     XR Knee Left 3 Views          Where to get your medicines      These medications were sent to [facility] [id number] [place] [address]  AT [facility] [address]  [address] [facility] [address]     Phone:  [phone]     adapalene 0.1 % cream    ibuprofen 600 MG tablet          Primary Care Provider Office Phone # Fax #    Natalia Starr [phone] [address] [facility] [address]        Equal Access to Services     ALYSSA ANTUNEZ AH: Hadii aad ku hadasho Soomaali, waaxda luqadaha, qaybta kaalmada adeegyada, waxay idiin haylenardn trevor renner. So [facility] [phone].    ATENCIÓN: Si habla español, tiene a cordero disposición servicios gratuitos de asistencia lingüística. Llame al [phone].    We comply with applicable federal civil rights laws and [place] laws. We do not discriminate on the basis of race, color, national origin, age, disability, sex, sexual orientation, or gender identity.            Thank you!     Thank you for choosing [facility]  for your care. Our goal is always to provide you with excellent care. Hearing back from our patients is one way we can continue to improve our services. Please take a few minutes to complete the written survey that you may receive in the mail after your visit with us. " Thank you!             Your Updated Medication List - Protect others around you: Learn how to safely use, store and throw away your medicines at www.disposemymeds.org.          This list is accurate as of 11/20/18 11:12 AM.  Always use your most recent med list.                   Brand Name Dispense Instructions for use Diagnosis    adapalene 0.1 % cream    DIFFERIN    45 g    Apply topically At Bedtime    Skin texture changes       atorvastatin 10 MG tablet    LIPITOR    90 tablet    TAKE 1 TABLET(10 MG) BY MOUTH DAILY    Hyperlipidemia, unspecified hyperlipidemia type       blood glucose calibration solution     1 Bottle    Use to calibrate blood glucose monitor as directed.    Type 2 diabetes mellitus without complication, without long-term current use of insulin (H)       blood glucose monitoring lancets     102 each    Use to test blood sugar one time daily or as directed.    Type 2 diabetes mellitus without complication, without long-term current use of insulin (H)       blood glucose monitoring meter device kit     1 kit    Use to test blood sugar one time daily.    Type 2 diabetes mellitus without complication, without long-term current use of insulin (H)       blood glucose monitoring test strip    ACCU-CHEK SMARTVIEW    100 strip    Use to test blood sugar one time daily.    Type 2 diabetes mellitus without complication, without long-term current use of insulin (H)       cholecalciferol 1000 UNIT tablet    vitamin D3     Take 4 tablets by mouth daily.        fluocinonide 0.05 % ointment    LIDEX     Apply topically 2 times daily        ibuprofen 600 MG tablet    ADVIL/MOTRIN    90 tablet    Take 1 tablet (600 mg) by mouth every 6 hours as needed for moderate pain    Acute pain of left knee       levothyroxine 100 MCG tablet    SYNTHROID/LEVOTHROID    90 tablet    Take 1 tablet (100 mcg) by mouth daily    Hypothyroidism, unspecified type       metFORMIN 500 MG 24 hr tablet    GLUCOPHAGE-XR    270 tablet     Take 500 mg PO in the morning and 1000 mg PO at night    Type 2 diabetes mellitus without complication, without long-term current use of insulin (H)       polyethylene glycol powder    MIRALAX/GLYCOLAX    510 g    TAKE 17 GRAMS BY MOUTH DAILY    Constipation, unspecified constipation type       triamcinolone 0.1 % ointment    KENALOG     Apply topically 2 times daily

## 2018-11-21 ENCOUNTER — TELEPHONE (OUTPATIENT)
Dept: FAMILY MEDICINE | Facility: OTHER | Age: 68
End: 2018-11-21

## 2018-11-21 ASSESSMENT — ANXIETY QUESTIONNAIRES: GAD7 TOTAL SCORE: 6

## 2018-11-21 NOTE — TELEPHONE ENCOUNTER
"DENIAL - 11/21/2018 - Received PA request thru CMM for adapalene creamen.  Patient's plan is not EPA enabled so could not be submitted thru CMM.  Called [facility] at [phone] and spoke with Neil to complete the PA request over telephone.  Spoke with clinical pharmacist and received DENIAL.  Denial reason:  Medication is approved for acne.  \"Skin texture changes\" diagnosis is not a medically-accepted condition.  Pharmacy advised.  Documentation will be scanned to Epic.  Eliz Fan, [facility] Specialist.  "

## 2019-05-13 DIAGNOSIS — E03.9 HYPOTHYROIDISM, UNSPECIFIED TYPE: ICD-10-CM

## 2019-05-13 RX ORDER — LEVOTHYROXINE SODIUM 100 UG/1
TABLET ORAL
Qty: 90 TABLET | Refills: 1 | Status: SHIPPED | OUTPATIENT
Start: 2019-05-13 | End: 2019-11-01

## 2019-05-20 NOTE — PROGRESS NOTES
SUBJECTIVE:   Kayla Lugo is a 69 year old female who presents to clinic today for the following   health issues:      Diabetes Follow-up      How often are you checking your blood sugar? One time daily    What time of day are you checking your blood sugars (select all that apply)?  Before meals    Have you had any blood sugars above 200?  No    Have you had any blood sugars below 70?  No    What symptoms do you notice when your blood sugar is low?  None    What concerns do you have today about your diabetes? None     Do you have any of these symptoms? (Select all that apply)  No numbness or tingling in feet.  No redness, sores or blisters on feet.  No complaints of excessive thirst.  No reports of blurry vision.  No significant changes to weight.     Have you had a diabetic eye exam in the last 12 months? Yes- Date of last eye exam: 11/18    BP Readings from Last 2 Encounters:   05/21/19 124/70   11/20/18 128/78     Hemoglobin A1C (%)   Date Value   11/20/2018 6.6 (H)   08/10/2018 7.0 (H)     LDL Cholesterol Calculated (mg/dL)   Date Value   11/20/2018 62   08/10/2018 68       Diabetes Management Resources      Hyperlipidemia Follow-Up      Are you having any of the following symptoms? (Select all that apply)  No complaints of shortness of breath, chest pain or pressure.  No increased sweating or nausea with activity.  No left-sided neck or arm pain.  No complaints of pain in calves when walking 1-2 blocks.    Are you regularly taking any medication or supplement to lower your cholesterol?   Yes- lipitor    Are you having muscle aches or other side effects that you think could be caused by your cholesterol lowering medication?  No       Hypertension Follow-up      Do you check your blood pressure regularly outside of the clinic? No     Are you following a low salt diet? Yes    Are your blood pressures ever more than 140 on the top number (systolic) OR more   than 90 on the bottom number (diastolic), for  example 140/90? No       Hypothyroidism Follow-up      Since last visit, patient describes the following symptoms: constipation      Amount of exercise or physical activity: 2-3 days/week for an average of 30-45 minutes    Problems taking medications regularly: No    Medication side effects: none    Diet: low salt        Additional history: as documented    Reviewed  and updated as needed this visit by clinical staff  Tobacco  Allergies  Meds  Med Hx  Surg Hx  Fam Hx  Soc Hx        Reviewed and updated as needed this visit by Provider         Patient Active Problem List   Diagnosis     Essential hypertension     Hypothyroidism     Esophageal reflux     Osteoarthrosis, unspecified whether generalized or localized, involving lower leg     Eczema     Hearing loss     Advanced care planning/counseling discussion     Skin lesion of face     Basal cell carcinoma of upper lip     S/P excision of skin lesion, follow-up exam     Diabetes mellitus, type 2 (H)     History of labyrinthitis     Hyperlipidemia, unspecified hyperlipidemia type     Past Surgical History:   Procedure Laterality Date     ARTHROSCOPY KNEE  2001    RT      COLONOSCOPY  2004     COLONOSCOPY  6/13/12    Dr. Gorman; 5 year recall given     COLONOSCOPY  07/11/2017     colonoscopy with polypectomy  2007    repeat in five      DILATION AND CURETTAGE  1996     EXCISE LESION LIP Right 9/16/2014    Procedure: EXCISE LESION LIP;  Surgeon: Bri Flores MD;  Location: [facility]     ORTHOPEDIC SURGERY Right 10-5-2015    right shoulder arthoscopy [facility]     NADIRA with BSO  1997       Social History     Tobacco Use     Smoking status: Never Smoker     Smokeless tobacco: Never Used   Substance Use Topics     Alcohol use: No     Alcohol/week: 0.0 oz     Family History   Problem Relation Age of Onset     Alcohol/Drug Father         alcoholism     C.A.D. Father         (cause of death)      Cancer Mother         basal cell     Ovarian Cancer Mother       Alcohol/Drug Son         alcoholism      C.A.D. Son      Diabetes Brother      Diabetes Maternal Grandmother          Current Outpatient Medications   Medication Sig Dispense Refill     adapalene (DIFFERIN) 0.1 % cream Apply topically At Bedtime 45 g 1     atorvastatin (LIPITOR) 10 MG tablet TAKE 1 TABLET(10 MG) BY MOUTH DAILY 90 tablet 3     blood glucose calibration (ACCU-CHEK SMARTVIEW CONTROL) solution Use to calibrate blood glucose monitor as directed. 1 Bottle 3     blood glucose monitoring (ACCU-CHEK FASTCLIX) lancets Use to test blood sugar one time daily or as directed. 102 each 6     blood glucose monitoring (ACCU-CHEK DESTINI SMARTVIEW) meter device kit Use to test blood sugar one time daily. 1 kit 0     blood glucose monitoring (ACCU-CHEK SMARTVIEW) test strip Use to test blood sugar one time daily. 100 strip 6     cholecalciferol (VITAMIN D3) 1000 UNIT tablet Take 4 tablets by mouth daily.       fluocinonide (LIDEX) 0.05 % ointment Apply topically 2 times daily       ibuprofen (ADVIL/MOTRIN) 600 MG tablet Take 1 tablet (600 mg) by mouth every 6 hours as needed for moderate pain 90 tablet 1     levothyroxine (SYNTHROID/LEVOTHROID) 100 MCG tablet TAKE 1 TABLET(100 MCG) BY MOUTH DAILY 90 tablet 1     metFORMIN (GLUCOPHAGE-XR) 500 MG 24 hr tablet Take 500 mg PO in the morning and 1000 mg PO at night 270 tablet 3     polyethylene glycol (MIRALAX/GLYCOLAX) powder TAKE 17 GRAMS BY MOUTH DAILY 510 g 2     triamcinolone (KENALOG) 0.1 % ointment Apply topically 2 times daily       Allergies   Allergen Reactions     Hydrochlorothiazide Rash     Atenolol Other (See Comments)     Bradycardia      Lisinopril Cough       ROS:  Constitutional, HEENT, cardiovascular, pulmonary, gi and gu systems are negative, except as otherwise noted.    OBJECTIVE:                                                    /70 (BP Location: Right arm, Patient Position: Sitting, Cuff Size: Adult Regular)   Pulse 72   Temp 98.5  F (36.9  C)  "(Tympanic)   Resp 16   Ht 1.651 m (5' 5\")   Wt 87.7 kg (193 lb 6.4 oz)   SpO2 98%   BMI 32.18 kg/m    Body mass index is 32.18 kg/m .  GENERAL APPEARANCE: healthy, alert and no distress  PSYCH: mentation appears normal and affect normal/bright    Diagnostic test results:  Labs reviewed in Epic     ASSESSMENT/PLAN:                                                    1. Type 2 diabetes mellitus without complication, without long-term current use of insulin (H)  Labs updated, no changes to current medications.  Follow-up in August for general exam, sooner as needed.  - Hemoglobin A1c    2. Hyperlipidemia, unspecified hyperlipidemia type  As above.  - ALT  - Lipid Profile    3. Essential hypertension  As above.  - Basic metabolic panel          Natalia Starr MD  [facility]      "

## 2019-05-21 ENCOUNTER — OFFICE VISIT (OUTPATIENT)
Dept: FAMILY MEDICINE | Facility: OTHER | Age: 69
End: 2019-05-21
Attending: FAMILY MEDICINE
Payer: MEDICARE

## 2019-05-21 VITALS
TEMPERATURE: 98.5 F | HEART RATE: 72 BPM | WEIGHT: 193.4 LBS | RESPIRATION RATE: 16 BRPM | SYSTOLIC BLOOD PRESSURE: 124 MMHG | OXYGEN SATURATION: 98 % | DIASTOLIC BLOOD PRESSURE: 70 MMHG | HEIGHT: 65 IN | BODY MASS INDEX: 32.22 KG/M2

## 2019-05-21 DIAGNOSIS — E78.5 HYPERLIPIDEMIA, UNSPECIFIED HYPERLIPIDEMIA TYPE: ICD-10-CM

## 2019-05-21 DIAGNOSIS — I10 ESSENTIAL HYPERTENSION: ICD-10-CM

## 2019-05-21 DIAGNOSIS — E11.9 TYPE 2 DIABETES MELLITUS WITHOUT COMPLICATION, WITHOUT LONG-TERM CURRENT USE OF INSULIN (H): Primary | ICD-10-CM

## 2019-05-21 LAB
ALT SERPL W P-5'-P-CCNC: 27 U/L (ref 0–50)
ANION GAP SERPL CALCULATED.3IONS-SCNC: 7 MMOL/L (ref 3–14)
BUN SERPL-MCNC: 10 MG/DL (ref 7–30)
CALCIUM SERPL-MCNC: 9.8 MG/DL (ref 8.5–10.1)
CHLORIDE SERPL-SCNC: 105 MMOL/L (ref 94–109)
CHOLEST SERPL-MCNC: 143 MG/DL
CO2 SERPL-SCNC: 28 MMOL/L (ref 20–32)
CREAT SERPL-MCNC: 0.87 MG/DL (ref 0.52–1.04)
EST. AVERAGE GLUCOSE BLD GHB EST-MCNC: 131 MG/DL
GFR SERPL CREATININE-BSD FRML MDRD: 68 ML/MIN/{1.73_M2}
GLUCOSE SERPL-MCNC: 103 MG/DL (ref 70–99)
HBA1C MFR BLD: 6.2 % (ref 0–5.6)
HDLC SERPL-MCNC: 37 MG/DL
LDLC SERPL CALC-MCNC: 81 MG/DL
NONHDLC SERPL-MCNC: 106 MG/DL
POTASSIUM SERPL-SCNC: 4.5 MMOL/L (ref 3.4–5.3)
SODIUM SERPL-SCNC: 140 MMOL/L (ref 133–144)
TRIGL SERPL-MCNC: 124 MG/DL

## 2019-05-21 PROCEDURE — 80048 BASIC METABOLIC PNL TOTAL CA: CPT | Mod: ZL | Performed by: FAMILY MEDICINE

## 2019-05-21 PROCEDURE — 84460 ALANINE AMINO (ALT) (SGPT): CPT | Mod: ZL | Performed by: FAMILY MEDICINE

## 2019-05-21 PROCEDURE — 80061 LIPID PANEL: CPT | Mod: ZL | Performed by: FAMILY MEDICINE

## 2019-05-21 PROCEDURE — 36415 COLL VENOUS BLD VENIPUNCTURE: CPT | Mod: ZL | Performed by: FAMILY MEDICINE

## 2019-05-21 PROCEDURE — 83036 HEMOGLOBIN GLYCOSYLATED A1C: CPT | Mod: ZL | Performed by: FAMILY MEDICINE

## 2019-05-21 PROCEDURE — 99214 OFFICE O/P EST MOD 30 MIN: CPT | Performed by: FAMILY MEDICINE

## 2019-05-21 PROCEDURE — G0463 HOSPITAL OUTPT CLINIC VISIT: HCPCS

## 2019-05-21 PROCEDURE — 40000788 ZZHCL STATISTIC ESTIMATED AVERAGE GLUCOSE: Mod: ZL | Performed by: FAMILY MEDICINE

## 2019-05-21 ASSESSMENT — ANXIETY QUESTIONNAIRES
5. BEING SO RESTLESS THAT IT IS HARD TO SIT STILL: NOT AT ALL
1. FEELING NERVOUS, ANXIOUS, OR ON EDGE: NOT AT ALL
6. BECOMING EASILY ANNOYED OR IRRITABLE: NOT AT ALL
2. NOT BEING ABLE TO STOP OR CONTROL WORRYING: NOT AT ALL
7. FEELING AFRAID AS IF SOMETHING AWFUL MIGHT HAPPEN: NOT AT ALL
IF YOU CHECKED OFF ANY PROBLEMS ON THIS QUESTIONNAIRE, HOW DIFFICULT HAVE THESE PROBLEMS MADE IT FOR YOU TO DO YOUR WORK, TAKE CARE OF THINGS AT HOME, OR GET ALONG WITH OTHER PEOPLE: NOT DIFFICULT AT ALL
3. WORRYING TOO MUCH ABOUT DIFFERENT THINGS: NOT AT ALL
GAD7 TOTAL SCORE: 0

## 2019-05-21 ASSESSMENT — PATIENT HEALTH QUESTIONNAIRE - PHQ9
5. POOR APPETITE OR OVEREATING: NOT AT ALL
SUM OF ALL RESPONSES TO PHQ QUESTIONS 1-9: 0

## 2019-05-21 ASSESSMENT — MIFFLIN-ST. JEOR: SCORE: 1408.14

## 2019-05-21 ASSESSMENT — PAIN SCALES - GENERAL: PAINLEVEL: NO PAIN (0)

## 2019-05-21 NOTE — NURSING NOTE
"Chief Complaint   Patient presents with     Diabetes     Thyroid Problem     Hypertension       Initial /70 (BP Location: Right arm, Patient Position: Sitting, Cuff Size: Adult Regular)   Pulse 72   Temp 98.5  F (36.9  C) (Tympanic)   Resp 16   Ht 1.651 m (5' 5\")   Wt 87.7 kg (193 lb 6.4 oz)   SpO2 98%   BMI 32.18 kg/m   Estimated body mass index is 32.18 kg/m  as calculated from the following:    Height as of this encounter: 1.651 m (5' 5\").    Weight as of this encounter: 87.7 kg (193 lb 6.4 oz).  Medication Reconciliation: complete    Chloe Dinero MA  "

## 2019-05-22 ASSESSMENT — ANXIETY QUESTIONNAIRES: GAD7 TOTAL SCORE: 0

## 2019-08-06 DIAGNOSIS — E11.9 TYPE 2 DIABETES MELLITUS WITHOUT COMPLICATION, WITHOUT LONG-TERM CURRENT USE OF INSULIN (H): ICD-10-CM

## 2019-08-06 RX ORDER — METFORMIN HCL 500 MG
TABLET, EXTENDED RELEASE 24 HR ORAL
Qty: 270 TABLET | Refills: 0 | Status: SHIPPED | OUTPATIENT
Start: 2019-08-06 | End: 2019-11-07

## 2019-08-20 ENCOUNTER — OFFICE VISIT (OUTPATIENT)
Dept: DERMATOLOGY | Facility: OTHER | Age: 69
End: 2019-08-20
Attending: FAMILY MEDICINE
Payer: MEDICARE

## 2019-08-20 VITALS
WEIGHT: 186 LBS | HEART RATE: 74 BPM | SYSTOLIC BLOOD PRESSURE: 140 MMHG | OXYGEN SATURATION: 98 % | BODY MASS INDEX: 30.99 KG/M2 | HEIGHT: 65 IN | TEMPERATURE: 98.4 F | DIASTOLIC BLOOD PRESSURE: 70 MMHG

## 2019-08-20 DIAGNOSIS — Z12.83 SCREENING FOR SKIN CANCER: ICD-10-CM

## 2019-08-20 DIAGNOSIS — Z85.828 HISTORY OF NONMELANOMA SKIN CANCER: Primary | ICD-10-CM

## 2019-08-20 DIAGNOSIS — D22.9 MULTIPLE BENIGN NEVI: ICD-10-CM

## 2019-08-20 PROCEDURE — 99213 OFFICE O/P EST LOW 20 MIN: CPT | Performed by: DERMATOLOGY

## 2019-08-20 PROCEDURE — G0463 HOSPITAL OUTPT CLINIC VISIT: HCPCS

## 2019-08-20 ASSESSMENT — MIFFLIN-ST. JEOR: SCORE: 1374.57

## 2019-08-20 ASSESSMENT — PAIN SCALES - GENERAL: PAINLEVEL: NO PAIN (0)

## 2019-08-20 NOTE — PROGRESS NOTES
Visit Date:   2019      SUBJECTIVE:  Soni returns for followup.  She had a basal cell on her upper lip in , some 5 years ago.  There has been no recurrence there.  She comes in for a skin exam.      OBJECTIVE:  A healthy lady in no distress.  We checked her face, her back, her upper chest, her neck.  Face was checked very carefully.  She did show a tiny nevus-like lesion at the antrum of the right nostril that measures roughly 1 mm.  She was not aware of it.  It is near where her  basal cell was before treatment..  I do not think it is a recurrence of basal cell because of its size and dome shape.  The basal cell site is well healed and without significant hypertrophic scarring.      We did not see anything on her chest, back, or face that was concerning, no actinic keratoses, no evidence of any other skin cancer.      ASSESSMENT:  No worrisome lesions in 5 years post basal cell removal of the upper lip area.        PLAN:  Return 1 year.        MEDICATIONS AND ALLERGIES:  Reviewed.      Reassured.         JELANI CALL MD             D: 2019   T: 2019   MT: NATALIE      Name:     SONI LOCKWOOD   MRN:      [id number]        Account:      [id number]   :      1950           Visit Date:   2019      Document: [id number]

## 2019-08-20 NOTE — NURSING NOTE
"Chief Complaint   Patient presents with     Derm Problem     Hx of BCC upper lip.       Initial BP (!) 140/70 (BP Location: Right arm, Patient Position: Chair, Cuff Size: Adult Regular)   Pulse 74   Temp 98.4  F (36.9  C) (Tympanic)   Ht 1.651 m (5' 5\")   Wt 84.4 kg (186 lb)   SpO2 98%   BMI 30.95 kg/m   Estimated body mass index is 30.95 kg/m  as calculated from the following:    Height as of this encounter: 1.651 m (5' 5\").    Weight as of this encounter: 84.4 kg (186 lb).  Medication Reconciliation: complete   EMMY MARIE      "

## 2019-08-20 NOTE — LETTER
2019       RE: Soni Lugo  [address]  [facility] [address]     Dear Colleague,    Thank you for referring your patient, Soni Lugo, to the [facility]. Please see a copy of my visit note below.    Visit Date:   2019      SUBJECTIVE:  Soni returns for followup.  She had a basal cell on her upper lip in , some 5 years ago.  There has been no recurrence there.  She comes in for a skin exam.      OBJECTIVE:  A healthy lady in no distress.  We checked her face, her back, her upper chest, her neck.  Face was checked very carefully.  She did show a tiny nevus-like lesion at the antrum of the right nostril that measures roughly 1 mm.  She was not aware of it.  It is near where her  basal cell was before treatment..  I do not think it is a recurrence of basal cell because of its size and dome shape.  The basal cell site is well healed and without significant hypertrophic scarring.      We did not see anything on her chest, back, or face that was concerning, no actinic keratoses, no evidence of any other skin cancer.      ASSESSMENT:  No worrisome lesions in 5 years post basal cell removal of the upper lip area.        PLAN:  Return 1 year.        MEDICATIONS AND ALLERGIES:  Reviewed.      Reassured.         JELANI CALL MD             D: 2019   T: 2019   MT: NATALIE      Name:     SONI LUGO   MRN:      [id number]        Account:      [id number]   :      1950           Visit Date:   2019      Document: [id number]        Again, thank you for allowing me to participate in the care of your patient.      Sincerely,    JELANI Call MD

## 2019-08-20 NOTE — PATIENT INSTRUCTIONS
Adapalene (Differin) is now over the counter and you can use it for anti-aging uses. Apply nightly. This product is like Retin A (tretinoin) but is slightly weaker but should be helpful. Also continue the sunscreen as often as possible.

## 2019-08-26 NOTE — PROGRESS NOTES
Subjective     Kayla Lugo is a 69 year old female who presents to clinic today for the following health issues:    HPI   Diabetes Follow-up      How often are you checking your blood sugar? A few times a week    What time of day are you checking your blood sugars (select all that apply)?  Before meals    Have you had any blood sugars above 200?  No    Have you had any blood sugars below 70?  No    What symptoms do you notice when your blood sugar is low?  None    What concerns do you have today about your diabetes? None     Do you have any of these symptoms? (Select all that apply)  No numbness or tingling in feet.  No redness, sores or blisters on feet.  No complaints of excessive thirst.  No reports of blurry vision.  No significant changes to weight.     Have you had a diabetic eye exam in the last 12 months? Yes- Date of last eye exam: 11/19    BP Readings from Last 2 Encounters:   08/27/19 (!) 142/72   08/20/19 (!) 140/70     Hemoglobin A1C (%)   Date Value   05/21/2019 6.2 (H)   11/20/2018 6.6 (H)     LDL Cholesterol Calculated (mg/dL)   Date Value   05/21/2019 81   11/20/2018 62       Diabetes Management Resources  Hyperlipidemia Follow-Up      Are you having any of the following symptoms? (Select all that apply)  No complaints of shortness of breath, chest pain or pressure.  No increased sweating or nausea with activity.  No left-sided neck or arm pain.  No complaints of pain in calves when walking 1-2 blocks.    Are you regularly taking any medication or supplement to lower your cholesterol?   Yes- lipitor    Are you having muscle aches or other side effects that you think could be caused by your cholesterol lowering medication?  Yes- ankles at night on ocassion      Hypertension Follow-up      Do you check your blood pressure regularly outside of the clinic? No     Are you following a low salt diet? Yes    Are your blood pressures ever more than 140 on the top number (systolic) OR more   than 90 on the  bottom number (diastolic), for example 140/90? No  Hypothyroidism Follow-up      Since last visit, patient describes the following symptoms: Weight stable, no hair loss, no skin changes, no constipation, no loose stools        How many servings of fruits and vegetables do you eat daily?  0-1    On average, how many sweetened beverages do you drink each day (soda, juice, sweet tea, etc)?   0    How many days per week do you miss taking your medication? 0          Patient Active Problem List   Diagnosis     Essential hypertension     Hypothyroidism     Esophageal reflux     Osteoarthrosis, unspecified whether generalized or localized, involving lower leg     Eczema     Hearing loss     Advanced care planning/counseling discussion     Skin lesion of face     Basal cell carcinoma of upper lip     S/P excision of skin lesion, follow-up exam     Diabetes mellitus, type 2 (H)     History of labyrinthitis     Hyperlipidemia, unspecified hyperlipidemia type     Past Surgical History:   Procedure Laterality Date     ARTHROSCOPY KNEE  2001    RT      COLONOSCOPY  2004     COLONOSCOPY  6/13/12    Dr. Gorman; 5 year recall given     COLONOSCOPY  07/11/2017     colonoscopy with polypectomy  2007    repeat in five      DILATION AND CURETTAGE  1996     EXCISE LESION LIP Right 9/16/2014    Procedure: EXCISE LESION LIP;  Surgeon: Bri Flores MD;  Location: [facility]     ORTHOPEDIC SURGERY Right 10-5-2015    right shoulder arthoscopy [facility]     NADIRA with BSO  1997       Social History     Tobacco Use     Smoking status: Never Smoker     Smokeless tobacco: Never Used   Substance Use Topics     Alcohol use: No     Alcohol/week: 0.0 oz     Family History   Problem Relation Age of Onset     Alcohol/Drug Father         alcoholism     C.A.D. Father         (cause of death)      Cancer Mother         basal cell     Ovarian Cancer Mother      Alcohol/Drug Son         alcoholism      C.A.D. Son      Diabetes Brother      Diabetes Maternal  Grandmother          Current Outpatient Medications   Medication Sig Dispense Refill     adapalene (DIFFERIN) 0.1 % cream Apply topically At Bedtime 45 g 1     atorvastatin (LIPITOR) 10 MG tablet TAKE 1 TABLET(10 MG) BY MOUTH DAILY 90 tablet 3     blood glucose calibration (ACCU-CHEK SMARTVIEW CONTROL) solution Use to calibrate blood glucose monitor as directed. 1 Bottle 3     blood glucose monitoring (ACCU-CHEK FASTCLIX) lancets Use to test blood sugar one time daily or as directed. 102 each 6     blood glucose monitoring (ACCU-CHEK DESTINI SMARTVIEW) meter device kit Use to test blood sugar one time daily. 1 kit 0     blood glucose monitoring (ACCU-CHEK SMARTVIEW) test strip Use to test blood sugar one time daily. 100 strip 6     cholecalciferol (VITAMIN D3) 1000 UNIT tablet Take 4 tablets by mouth daily.       fluocinonide (LIDEX) 0.05 % ointment Apply topically 2 times daily       ibuprofen (ADVIL/MOTRIN) 600 MG tablet Take 1 tablet (600 mg) by mouth every 6 hours as needed for moderate pain 90 tablet 1     levothyroxine (SYNTHROID/LEVOTHROID) 100 MCG tablet TAKE 1 TABLET(100 MCG) BY MOUTH DAILY 90 tablet 1     metFORMIN (GLUCOPHAGE-XR) 500 MG 24 hr tablet TAKE 1 TABLET BY MOUTH EVERY MORNING AND 2 TABLETS EVERY NIGHT 270 tablet 0     polyethylene glycol (MIRALAX/GLYCOLAX) powder TAKE 17 GRAMS BY MOUTH DAILY 510 g 2     triamcinolone (KENALOG) 0.1 % ointment Apply topically 2 times daily       Allergies   Allergen Reactions     Hydrochlorothiazide Rash     Atenolol Other (See Comments)     Bradycardia      Lisinopril Cough         Reviewed and updated as needed this visit by Provider  Tobacco  Allergies  Meds  Problems  Med Hx  Surg Hx  Fam Hx         Review of Systems   ROS COMP: Constitutional, HEENT, cardiovascular, pulmonary, gi and gu systems are negative, except as otherwise noted.      Objective    BP (!) 142/72 (BP Location: Right arm, Patient Position: Sitting, Cuff Size: Adult Regular)   Pulse 71   " Temp 97.8  F (36.6  C) (Tympanic)   Resp 16   Ht 1.651 m (5' 5\")   Wt 84.4 kg (186 lb)   SpO2 98%   BMI 30.95 kg/m    Body mass index is 30.95 kg/m .  Physical Exam   GENERAL: healthy, alert and no distress  EYES: Eyes grossly normal to inspection, PERRL and conjunctivae and sclerae normal  HENT: ear canals and TM's normal, nose and mouth without ulcers or lesions  NECK: no adenopathy  RESP: lungs clear to auscultation - no rales, rhonchi or wheezes  CV: regular rates and rhythm, normal S1 S2, no S3 or S4 and no murmur, click or rub  PSYCH: mentation appears normal, affect normal/bright    Diagnostic Test Results:  none         Assessment & Plan     1. Type 2 diabetes mellitus without complication, without long-term current use of insulin (H)  Future lab orders placed, follow-up appointment due in November.   - Albumin Random Urine Quantitative with Creat Ratio; Future  - Hemoglobin A1c; Future    2. Hyperlipidemia, unspecified hyperlipidemia type  As above  - ALT; Future  - Lipid Profile; Future    3. Essential hypertension  As above  - Basic metabolic panel; Future    4. Hypothyroidism, unspecified type  As above  - TSH; Future     BMI:   Estimated body mass index is 30.95 kg/m  as calculated from the following:    Height as of this encounter: 1.651 m (5' 5\").    Weight as of this encounter: 84.4 kg (186 lb).           Return in about 3 months (around 11/27/2019) for Diabetic Follow-up.    Natalia Starr MD  [facility]      "

## 2019-08-27 ENCOUNTER — OFFICE VISIT (OUTPATIENT)
Dept: FAMILY MEDICINE | Facility: OTHER | Age: 69
End: 2019-08-27
Attending: FAMILY MEDICINE
Payer: MEDICARE

## 2019-08-27 VITALS
HEIGHT: 65 IN | DIASTOLIC BLOOD PRESSURE: 72 MMHG | RESPIRATION RATE: 16 BRPM | TEMPERATURE: 97.8 F | BODY MASS INDEX: 30.99 KG/M2 | OXYGEN SATURATION: 98 % | HEART RATE: 71 BPM | SYSTOLIC BLOOD PRESSURE: 142 MMHG | WEIGHT: 186 LBS

## 2019-08-27 DIAGNOSIS — E11.9 TYPE 2 DIABETES MELLITUS WITHOUT COMPLICATION, WITHOUT LONG-TERM CURRENT USE OF INSULIN (H): Primary | ICD-10-CM

## 2019-08-27 DIAGNOSIS — E78.5 HYPERLIPIDEMIA, UNSPECIFIED HYPERLIPIDEMIA TYPE: ICD-10-CM

## 2019-08-27 DIAGNOSIS — I10 ESSENTIAL HYPERTENSION: ICD-10-CM

## 2019-08-27 DIAGNOSIS — E03.9 HYPOTHYROIDISM, UNSPECIFIED TYPE: ICD-10-CM

## 2019-08-27 PROCEDURE — G0463 HOSPITAL OUTPT CLINIC VISIT: HCPCS

## 2019-08-27 PROCEDURE — 99214 OFFICE O/P EST MOD 30 MIN: CPT | Performed by: FAMILY MEDICINE

## 2019-08-27 ASSESSMENT — PAIN SCALES - GENERAL: PAINLEVEL: MILD PAIN (2)

## 2019-08-27 ASSESSMENT — MIFFLIN-ST. JEOR: SCORE: 1374.57

## 2019-08-27 NOTE — NURSING NOTE
"Chief Complaint   Patient presents with     Diabetes     Lipids     Hypertension     Thyroid Problem       Initial BP (!) 142/72 (BP Location: Right arm, Patient Position: Sitting, Cuff Size: Adult Regular)   Pulse 71   Temp 97.8  F (36.6  C) (Tympanic)   Resp 16   Ht 1.651 m (5' 5\")   Wt 84.4 kg (186 lb)   SpO2 98%   BMI 30.95 kg/m   Estimated body mass index is 30.95 kg/m  as calculated from the following:    Height as of this encounter: 1.651 m (5' 5\").    Weight as of this encounter: 84.4 kg (186 lb).  Medication Reconciliation: complete  "

## 2019-10-14 ENCOUNTER — TELEPHONE (OUTPATIENT)
Dept: FAMILY MEDICINE | Facility: OTHER | Age: 69
End: 2019-10-14

## 2019-10-14 DIAGNOSIS — E11.9 TYPE 2 DIABETES MELLITUS WITHOUT COMPLICATION, WITHOUT LONG-TERM CURRENT USE OF INSULIN (H): ICD-10-CM

## 2019-10-14 RX ORDER — LANCETS
EACH MISCELLANEOUS
Qty: 102 EACH | Refills: 1 | Status: SHIPPED | OUTPATIENT
Start: 2019-10-14 | End: 2019-11-25

## 2019-10-14 NOTE — TELEPHONE ENCOUNTER
9:45 AM    Reason for Call: Phone Call    Description: Pt is requesting that her strips and the needle that goes in the pen, pt needs to get these refilled.    Was an appointment offered for this call? No  If yes : Appointment type              Date    Preferred method for responding to this message: Telephone Call  What is your phone number ?  [phone] Kayla    If we cannot reach you directly, may we leave a detailed response at the number you provided? Yes    Can this message wait until your PCP/provider returns, if available today? Not applicable, PCP is in     [facility] [place] [place]

## 2019-10-17 ENCOUNTER — IMMUNIZATION (OUTPATIENT)
Dept: FAMILY MEDICINE | Facility: OTHER | Age: 69
End: 2019-10-17
Attending: FAMILY MEDICINE
Payer: MEDICARE

## 2019-10-17 DIAGNOSIS — Z23 NEED FOR PROPHYLACTIC VACCINATION AND INOCULATION AGAINST INFLUENZA: Primary | ICD-10-CM

## 2019-10-17 PROCEDURE — 90662 IIV NO PRSV INCREASED AG IM: CPT

## 2019-10-17 PROCEDURE — G0008 ADMIN INFLUENZA VIRUS VAC: HCPCS

## 2019-11-08 ENCOUNTER — TRANSFERRED RECORDS (OUTPATIENT)
Dept: HEALTH INFORMATION MANAGEMENT | Facility: CLINIC | Age: 69
End: 2019-11-08

## 2019-11-20 NOTE — PROGRESS NOTES
Subjective     Kayla Lugo is a 69 year old female who presents to clinic today for the following health issues:    HPI   Diabetes Follow-up    How often are you checking your blood sugar? One time daily  What time of day are you checking your blood sugars (select all that apply)?  Before and after meals  Have you had any blood sugars above 200?  No  Have you had any blood sugars below 70?  Yes once    What symptoms do you notice when your blood sugar is low?  Shaky    What concerns do you have today about your diabetes? None     Do you have any of these symptoms? (Select all that apply)  No numbness or tingling in feet.  No redness, sores or blisters on feet.  No complaints of excessive thirst.  No reports of blurry vision.  No significant changes to weight.     Have you had a diabetic eye exam in the last 12 months? Yes- Date of last eye exam: November 2019    BP Readings from Last 2 Encounters:   11/25/19 (!) 150/82   08/27/19 (!) 142/72     Hemoglobin A1C (%)   Date Value   05/21/2019 6.2 (H)   11/20/2018 6.6 (H)     LDL Cholesterol Calculated (mg/dL)   Date Value   05/21/2019 81   11/20/2018 62       Diabetes Management Resources    Hyperlipidemia Follow-Up      Are you having any of the following symptoms? (Select all that apply)  No complaints of shortness of breath, chest pain or pressure.  No increased sweating or nausea with activity.  No left-sided neck or arm pain.  No complaints of pain in calves when walking 1-2 blocks.    Are you regularly taking any medication or supplement to lower your cholesterol?   Yes- atorvastatin    Are you having muscle aches or other side effects that you think could be caused by your cholesterol lowering medication?  No    Hypertension Follow-up      Do you check your blood pressure regularly outside of the clinic? No     Are you following a low salt diet? Yes    Are your blood pressures ever more than 140 on the top number (systolic) OR more   than 90 on the bottom  number (diastolic), for example 140/90? Yes    Hypothyroidism Follow-up      Since last visit, patient describes the following symptoms: Weight stable, no hair loss, no skin changes, no constipation, no loose stools      How many servings of fruits and vegetables do you eat daily?  0-1    On average, how many sweetened beverages do you drink each day (soda, juice, sweet tea, etc)?   0    How many days per week do you miss taking your medication? 0      URINARY TRACT SYMPTOMS      Duration: 1 month    Description  odor    Intensity:  mild    Accompanying signs and symptoms:  Fever/chills: no   Flank pain no   Nausea and vomiting: no   Vaginal symptoms: itching  Abdominal/Pelvic Pain: no     History  History of frequent UTI's: no   History of kidney stones: no   Sexually Active: no   Possibility of pregnancy: No    Precipitating or alleviating factors: None    Therapies tried and outcome: none   Outcome: N/A      Patient Active Problem List   Diagnosis     Essential hypertension     Hypothyroidism     Esophageal reflux     Osteoarthrosis, unspecified whether generalized or localized, involving lower leg     Eczema     Hearing loss     Advanced care planning/counseling discussion     Skin lesion of face     Basal cell carcinoma of upper lip     S/P excision of skin lesion, follow-up exam     Diabetes mellitus, type 2 (H)     History of labyrinthitis     Hyperlipidemia, unspecified hyperlipidemia type     Past Surgical History:   Procedure Laterality Date     ARTHROSCOPY KNEE  2001    RT      COLONOSCOPY  2004     COLONOSCOPY  6/13/12    Dr. Gorman; 5 year recall given     COLONOSCOPY  07/11/2017     colonoscopy with polypectomy  2007    repeat in five      DILATION AND CURETTAGE  1996     EXCISE LESION LIP Right 9/16/2014    Procedure: EXCISE LESION LIP;  Surgeon: Bri Flores MD;  Location: [facility]     ORTHOPEDIC SURGERY Right 10-5-2015    right shoulder arthoscopy RCR     NADIRA with BSO  1997       Social  History     Tobacco Use     Smoking status: Never Smoker     Smokeless tobacco: Never Used   Substance Use Topics     Alcohol use: No     Alcohol/week: 0.0 standard drinks     Family History   Problem Relation Age of Onset     Alcohol/Drug Father         alcoholism     C.A.D. Father         (cause of death)      Cancer Mother         basal cell     Ovarian Cancer Mother      Alcohol/Drug Son         alcoholism      C.A.D. Son      Diabetes Brother      Diabetes Maternal Grandmother          Current Outpatient Medications   Medication Sig Dispense Refill     amLODIPine (NORVASC) 2.5 MG tablet Take 1 tablet (2.5 mg) by mouth daily 30 tablet 2     atorvastatin (LIPITOR) 10 MG tablet TAKE 1 TABLET BY MOUTH EVERY DAY 30 tablet 0     blood glucose (ACCU-CHEK SMARTVIEW) test strip Use to test blood sugar one time daily. 100 strip 1     blood glucose calibration (ACCU-CHEK SMARTVIEW CONTROL) solution Use to calibrate blood glucose monitor as directed. 1 Bottle 3     blood glucose monitoring (ACCU-CHEK FASTCLIX) lancets USE TO TEST BLOOD SUGARS ONCE DAILY 102 each 0     blood glucose monitoring (ACCU-CHEK DESTINI SMARTVIEW) meter device kit Use to test blood sugar one time daily. 1 kit 0     cholecalciferol (VITAMIN D3) 1000 UNIT tablet Take 4 tablets by mouth daily.       fluocinonide (LIDEX) 0.05 % ointment Apply topically 2 times daily       ibuprofen (ADVIL/MOTRIN) 600 MG tablet Take 1 tablet (600 mg) by mouth every 6 hours as needed for moderate pain 90 tablet 1     levothyroxine (SYNTHROID/LEVOTHROID) 100 MCG tablet TAKE 1 TABLET(100 MCG) BY MOUTH DAILY 30 tablet 0     metFORMIN (GLUCOPHAGE-XR) 500 MG 24 hr tablet TAKE 1 TABLET BY MOUTH EVERY MORNING AND 2 TABLETS EVERY NIGHT 90 tablet 0     polyethylene glycol (MIRALAX/GLYCOLAX) powder TAKE 17 GRAMS BY MOUTH DAILY 510 g 2     triamcinolone (KENALOG) 0.1 % ointment Apply topically 2 times daily       Allergies   Allergen Reactions     Hydrochlorothiazide Rash     Atenolol  "Other (See Comments)     Bradycardia      Lisinopril Cough         Reviewed and updated as needed this visit by Provider         Review of Systems   ROS COMP: Constitutional, HEENT, cardiovascular, pulmonary, gi and gu systems are negative, except as otherwise noted.      Objective    BP (!) 150/82 (BP Location: Right arm, Patient Position: Sitting, Cuff Size: Adult Regular)   Pulse 69   Temp 97.9  F (36.6  C) (Tympanic)   Wt 83.1 kg (183 lb 3.2 oz)   SpO2 97%   BMI 30.49 kg/m    Body mass index is 30.49 kg/m .  Physical Exam   GENERAL: healthy, alert and no distress  PSYCH: mentation appears normal, affect normal/bright    Diagnostic Test Results:  See orders        Assessment & Plan     1. Type 2 diabetes mellitus without complication, without long-term current use of insulin (H)  Labs updated, will adjust medications as needed.  - Hemoglobin A1c  - Albumin Random Urine Quantitative with Creat Ratio    2. Hyperlipidemia, unspecified hyperlipidemia type  As above.  - Lipid Profile  - ALT    3. Essential hypertension  Will retry low dose amlodipine for her elevated BP.  Follow-up in a week or two for nurse only BP check  - amLODIPine (NORVASC) 2.5 MG tablet; Take 1 tablet (2.5 mg) by mouth daily  Dispense: 30 tablet; Refill: 2  - Basic metabolic panel    4. Hypothyroidism, unspecified type  Labs updated  - TSH    5. Vaginal discharge  Will check labs as listed  - *UA reflex to Microscopic and Culture - [facility]  - Wet prep     BMI:   Estimated body mass index is 30.49 kg/m  as calculated from the following:    Height as of 8/27/19: 1.651 m (5' 5\").    Weight as of this encounter: 83.1 kg (183 lb 3.2 oz).         Return in about 6 months (around 5/25/2020) for Diabetic Follow-up.    Natalia Starr MD  [facility]      "

## 2019-11-25 ENCOUNTER — OFFICE VISIT (OUTPATIENT)
Dept: FAMILY MEDICINE | Facility: OTHER | Age: 69
End: 2019-11-25
Attending: FAMILY MEDICINE
Payer: MEDICARE

## 2019-11-25 VITALS
OXYGEN SATURATION: 97 % | WEIGHT: 183.2 LBS | DIASTOLIC BLOOD PRESSURE: 82 MMHG | TEMPERATURE: 97.9 F | SYSTOLIC BLOOD PRESSURE: 150 MMHG | BODY MASS INDEX: 30.49 KG/M2 | HEART RATE: 69 BPM

## 2019-11-25 DIAGNOSIS — N89.8 VAGINAL DISCHARGE: ICD-10-CM

## 2019-11-25 DIAGNOSIS — I10 ESSENTIAL HYPERTENSION: ICD-10-CM

## 2019-11-25 DIAGNOSIS — E11.9 TYPE 2 DIABETES MELLITUS WITHOUT COMPLICATION, WITHOUT LONG-TERM CURRENT USE OF INSULIN (H): Primary | ICD-10-CM

## 2019-11-25 DIAGNOSIS — E78.5 HYPERLIPIDEMIA, UNSPECIFIED HYPERLIPIDEMIA TYPE: ICD-10-CM

## 2019-11-25 DIAGNOSIS — E03.9 HYPOTHYROIDISM, UNSPECIFIED TYPE: ICD-10-CM

## 2019-11-25 LAB
ALBUMIN UR-MCNC: NEGATIVE MG/DL
ALT SERPL W P-5'-P-CCNC: 23 U/L (ref 0–50)
ANION GAP SERPL CALCULATED.3IONS-SCNC: 7 MMOL/L (ref 3–14)
APPEARANCE UR: CLEAR
BILIRUB UR QL STRIP: NEGATIVE
BUN SERPL-MCNC: 14 MG/DL (ref 7–30)
CALCIUM SERPL-MCNC: 10 MG/DL (ref 8.5–10.1)
CHLORIDE SERPL-SCNC: 103 MMOL/L (ref 94–109)
CHOLEST SERPL-MCNC: 138 MG/DL
CO2 SERPL-SCNC: 26 MMOL/L (ref 20–32)
COLOR UR AUTO: YELLOW
CREAT SERPL-MCNC: 0.8 MG/DL (ref 0.52–1.04)
CREAT UR-MCNC: 15 MG/DL
EST. AVERAGE GLUCOSE BLD GHB EST-MCNC: 114 MG/DL
GFR SERPL CREATININE-BSD FRML MDRD: 76 ML/MIN/{1.73_M2}
GLUCOSE SERPL-MCNC: 93 MG/DL (ref 70–99)
GLUCOSE UR STRIP-MCNC: NEGATIVE MG/DL
HBA1C MFR BLD: 5.6 % (ref 0–5.6)
HDLC SERPL-MCNC: 43 MG/DL
HGB UR QL STRIP: NEGATIVE
KETONES UR STRIP-MCNC: NEGATIVE MG/DL
LDLC SERPL CALC-MCNC: 66 MG/DL
LEUKOCYTE ESTERASE UR QL STRIP: NEGATIVE
MICROALBUMIN UR-MCNC: <5 MG/L
MICROALBUMIN/CREAT UR: NORMAL MG/G CR (ref 0–25)
NITRATE UR QL: NEGATIVE
NONHDLC SERPL-MCNC: 95 MG/DL
PH UR STRIP: 7 PH (ref 5–7)
POTASSIUM SERPL-SCNC: 3.9 MMOL/L (ref 3.4–5.3)
SODIUM SERPL-SCNC: 136 MMOL/L (ref 133–144)
SOURCE: NORMAL
SP GR UR STRIP: <=1.005 (ref 1–1.03)
SPECIMEN SOURCE: NORMAL
TRIGL SERPL-MCNC: 143 MG/DL
TSH SERPL DL<=0.005 MIU/L-ACNC: 0.32 MU/L (ref 0.4–4)
UROBILINOGEN UR STRIP-ACNC: 0.2 EU/DL (ref 0.2–1)
WET PREP SPEC: NORMAL

## 2019-11-25 PROCEDURE — 82043 UR ALBUMIN QUANTITATIVE: CPT | Mod: ZL | Performed by: FAMILY MEDICINE

## 2019-11-25 PROCEDURE — 84460 ALANINE AMINO (ALT) (SGPT): CPT | Mod: ZL | Performed by: FAMILY MEDICINE

## 2019-11-25 PROCEDURE — 80061 LIPID PANEL: CPT | Mod: ZL | Performed by: FAMILY MEDICINE

## 2019-11-25 PROCEDURE — 81003 URINALYSIS AUTO W/O SCOPE: CPT | Mod: ZL | Performed by: FAMILY MEDICINE

## 2019-11-25 PROCEDURE — 40000788 ZZHCL STATISTIC ESTIMATED AVERAGE GLUCOSE: Mod: ZL | Performed by: FAMILY MEDICINE

## 2019-11-25 PROCEDURE — 99214 OFFICE O/P EST MOD 30 MIN: CPT | Performed by: FAMILY MEDICINE

## 2019-11-25 PROCEDURE — 36415 COLL VENOUS BLD VENIPUNCTURE: CPT | Mod: ZL | Performed by: FAMILY MEDICINE

## 2019-11-25 PROCEDURE — 84443 ASSAY THYROID STIM HORMONE: CPT | Mod: ZL | Performed by: FAMILY MEDICINE

## 2019-11-25 PROCEDURE — 83036 HEMOGLOBIN GLYCOSYLATED A1C: CPT | Mod: ZL | Performed by: FAMILY MEDICINE

## 2019-11-25 PROCEDURE — G0463 HOSPITAL OUTPT CLINIC VISIT: HCPCS

## 2019-11-25 PROCEDURE — 80048 BASIC METABOLIC PNL TOTAL CA: CPT | Mod: ZL | Performed by: FAMILY MEDICINE

## 2019-11-25 PROCEDURE — 87210 SMEAR WET MOUNT SALINE/INK: CPT | Mod: ZL | Performed by: FAMILY MEDICINE

## 2019-11-25 RX ORDER — AMLODIPINE BESYLATE 2.5 MG/1
2.5 TABLET ORAL DAILY
Qty: 30 TABLET | Refills: 2 | Status: SHIPPED | OUTPATIENT
Start: 2019-11-25 | End: 2019-11-25

## 2019-11-25 ASSESSMENT — PAIN SCALES - GENERAL: PAINLEVEL: NO PAIN (0)

## 2019-11-25 NOTE — NURSING NOTE
"Chief Complaint   Patient presents with     Diabetes     Lipids     Thyroid Problem       Initial BP (!) 150/82 (BP Location: Right arm, Patient Position: Sitting, Cuff Size: Adult Regular)   Pulse 69   Temp 97.9  F (36.6  C) (Tympanic)   Wt 83.1 kg (183 lb 3.2 oz)   SpO2 97%   BMI 30.49 kg/m   Estimated body mass index is 30.49 kg/m  as calculated from the following:    Height as of 8/27/19: 1.651 m (5' 5\").    Weight as of this encounter: 83.1 kg (183 lb 3.2 oz).  Medication Reconciliation: complete  Ashley A. Lechevalier, LPN  "

## 2020-01-14 DIAGNOSIS — R52 PAIN: ICD-10-CM

## 2020-01-14 DIAGNOSIS — H91.90 HEARING LOSS: Primary | ICD-10-CM

## 2020-01-15 ENCOUNTER — OFFICE VISIT (OUTPATIENT)
Dept: AUDIOLOGY | Facility: OTHER | Age: 70
End: 2020-01-15
Attending: AUDIOLOGIST
Payer: MEDICARE

## 2020-01-15 DIAGNOSIS — H90.5 SENSORY HEARING LOSS, UNILATERAL: Primary | ICD-10-CM

## 2020-01-15 PROCEDURE — 92557 COMPREHENSIVE HEARING TEST: CPT | Performed by: AUDIOLOGIST

## 2020-01-15 PROCEDURE — 92550 TYMPANOMETRY & REFLEX THRESH: CPT | Performed by: AUDIOLOGIST

## 2020-01-15 NOTE — PROGRESS NOTES
Audiology Evaluation Completed. Please refer SCANNED AUDIOGRAM and/or TYMPANOGRAM for BACKGROUND, RESULTS, RECOMMENDATIONS.      Kylie CHAUDHARY, [facility]  Audiologist #[id number]

## 2020-01-27 ENCOUNTER — OFFICE VISIT (OUTPATIENT)
Dept: OTOLARYNGOLOGY | Facility: OTHER | Age: 70
End: 2020-01-27
Attending: AUDIOLOGIST
Payer: MEDICARE

## 2020-01-27 VITALS
BODY MASS INDEX: 30.66 KG/M2 | TEMPERATURE: 98 F | SYSTOLIC BLOOD PRESSURE: 148 MMHG | WEIGHT: 184 LBS | DIASTOLIC BLOOD PRESSURE: 80 MMHG | HEIGHT: 65 IN | OXYGEN SATURATION: 96 % | HEART RATE: 70 BPM

## 2020-01-27 DIAGNOSIS — H69.91 DYSFUNCTION OF RIGHT EUSTACHIAN TUBE: Primary | ICD-10-CM

## 2020-01-27 DIAGNOSIS — H92.01 OTALGIA, RIGHT: ICD-10-CM

## 2020-01-27 PROCEDURE — 99213 OFFICE O/P EST LOW 20 MIN: CPT | Mod: 25 | Performed by: NURSE PRACTITIONER

## 2020-01-27 PROCEDURE — 92504 EAR MICROSCOPY EXAMINATION: CPT | Performed by: NURSE PRACTITIONER

## 2020-01-27 PROCEDURE — 31231 NASAL ENDOSCOPY DX: CPT | Performed by: NURSE PRACTITIONER

## 2020-01-27 PROCEDURE — G0463 HOSPITAL OUTPT CLINIC VISIT: HCPCS

## 2020-01-27 RX ORDER — FLUTICASONE PROPIONATE 50 MCG
2 SPRAY, SUSPENSION (ML) NASAL DAILY
Qty: 18.2 ML | Refills: 1 | Status: SHIPPED | OUTPATIENT
Start: 2020-01-27 | End: 2020-03-26

## 2020-01-27 ASSESSMENT — MIFFLIN-ST. JEOR: SCORE: 1365.5

## 2020-01-27 ASSESSMENT — PAIN SCALES - GENERAL: PAINLEVEL: MILD PAIN (2)

## 2020-01-27 NOTE — PROGRESS NOTES
Otolaryngology Note         Chief Complaint:     Patient presents with:  Hearing Problem: Patient here for f/u on bilateral hearing loss, pain           History of Present Illness:     Kayla Lugo is a 69 year old female seen today for hearing loss.  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  She was seen by dr Flores in 2014 and diagnosed with ASNHL, MRI was completed and negative for AN.      She note in August 2019 she noted pain in the left ear, was seen by Dr Starr, no infection  On January 5, noted pain in both ears.  She did not note a change in hearing at that time.  She has continued pain in the right ear since with occasional pain in the left ear. She have has been taking advil 200 mg twice daily for pain with improvement.   No otorrhea.    She feels that her left hearing has improved from when she first had the Sudden hearing loss.  She can hear people talking to her on the telephone on her left ear.    She has noticed some low grade tinnitus in the left ear, she states she has been paying attention and has now noticed it.  It is not bothersome to her.   No numbness or tingling in her face.  She does have some maxillary sinus pain/pressure at times.    No PND, but does feel she had in back in Decemeber prior to the ear pain  She denies dizziness/vertigo.  She has had Vertigo in the past.    No history of COM, otological surgery  No family history of hearing loss  There is no prior history of ear surgery or trauma to the ear   No noise exposure.    She denies known clenching or grinding, no history of TMJ    Prior audiogram 10/29/14:  No Tympanogram available  Thresholds are WNl range right ear and mold to moderate SNHL left ear.    + recent inciting events had URI prior to onset of ear pain    Audiogram 1/15/2020:   Tympanograms are Type A for both ears suggesting normal eardrum mobility.  Acoustic Reflex Thresholds at 1000 Hz are present for both  ears.  Thresholds are stable right in normal range and decreased left 6-8kHz with decreased word discrimination from 100% to 64%.    Previous imagin14 MRI IAC  No significant mastoid fluid is seen.  There is mucosal thickening and  probable bilateral maxillary sinus retention cysts.     IMPRESSION:  NO FINDING TO ACCOUNT FOR ASYMMETRIC SENSORINEURAL  HEARING LOSS.  Exam Date: Dec 23, 2014 05:21:28 PM  Author: JUSTICE CARVER         Medications:     Current Outpatient Rx   Medication Sig Dispense Refill     atorvastatin (LIPITOR) 10 MG tablet TAKE 1 TABLET BY MOUTH EVERY DAY 30 tablet 0     blood glucose (ACCU-CHEK SMARTVIEW) test strip Use to test blood sugar one time daily. 100 strip 1     blood glucose calibration (ACCU-CHEK SMARTVIEW CONTROL) solution Use to calibrate blood glucose monitor as directed. 1 Bottle 3     blood glucose monitoring (ACCU-CHEK FASTCLIX) lancets USE TO TEST BLOOD SUGARS ONCE DAILY 102 each 0     blood glucose monitoring (ACCU-CHEK DESTINI SMARTVIEW) meter device kit Use to test blood sugar one time daily. 1 kit 0     cetirizine HCl 10 MG CAPS Take 1 capsule (10 mg) by mouth At Bedtime 30 capsule 1     cholecalciferol (VITAMIN D3) 1000 UNIT tablet Take 4 tablets by mouth daily.       fluocinonide (LIDEX) 0.05 % ointment Apply topically 2 times daily       fluticasone (FLONASE) 50 MCG/ACT nasal spray Spray 2 sprays into both nostrils daily 18.2 mL 1     ibuprofen (ADVIL/MOTRIN) 600 MG tablet Take 1 tablet (600 mg) by mouth every 6 hours as needed for moderate pain 90 tablet 1     levothyroxine (SYNTHROID/LEVOTHROID) 100 MCG tablet TAKE 1 TABLET(100 MCG) BY MOUTH DAILY 30 tablet 0     metFORMIN (GLUCOPHAGE-XR) 500 MG 24 hr tablet TAKE 1 TABLET BY MOUTH EVERY MORNING AND 2 TABLETS BY MOUTH AT NIGHT 90 tablet 3     polyethylene glycol (MIRALAX/GLYCOLAX) powder TAKE 17 GRAMS BY MOUTH DAILY 510 g 2     triamcinolone (KENALOG) 0.1 % ointment Apply topically 2 times daily               "Allergies:     Allergies: Hydrochlorothiazide; Atenolol; and Lisinopril          Past Medical History:     Past Medical History:   Diagnosis Date     Diabetes mellitus, type 2 (H) 10/22/2014     Eczema 06/09/2011     Esophageal reflux 06/09/2011     Helicobacter pylori (H. pylori) 06/09/2011    history of      Osteoarthrosis, unspecified whether generalized or localized, lower leg 03/15/2001    Yuval Rapp MD      Personal history of other endocrine, metabolic, and immunity disorders 06/09/2011     Unspecified acquired hypothyroidism 06/09/2011    Natalia Starr MD     Unspecified essential hypertension 03/27/2001    Geremias Rouse MD      Unspecified hearing loss, left 06/09/2011            Past Surgical History:     Past Surgical History:   Procedure Laterality Date     ARTHROSCOPY KNEE  2001    RT      COLONOSCOPY  2004     COLONOSCOPY  06/13/2012    Dr. Gorman; 5 year recall given     COLONOSCOPY  07/11/2017     colonoscopy with polypectomy  2007    repeat in five      DILATION AND CURETTAGE  1996     EXCISE LESION LIP Right 9/16/2014    Procedure: EXCISE LESION LIP;  Surgeon: Bri Flores MD;  Location: [facility]     ORTHOPEDIC SURGERY Right 10-5-2015    right shoulder arthoscopy RCR     NADIRA with BSO  1997       ENT family history reviewed         Social History:     Social History     Tobacco Use     Smoking status: Never Smoker     Smokeless tobacco: Never Used   Substance Use Topics     Alcohol use: No     Alcohol/week: 0.0 standard drinks     Drug use: No            Review of Systems:     ROS: See HPI         Physical Exam:     BP (!) 148/80 (BP Location: Right arm, Cuff Size: Adult Regular)   Pulse 70   Temp 98  F (36.7  C) (Tympanic)   Ht 1.651 m (5' 5\")   Wt 83.5 kg (184 lb)   SpO2 96%   BMI 30.62 kg/m      General - The patient is well nourished and well developed, and appears to have good nutritional status.  Alert and oriented to person and place, answers questions and cooperates " with examination appropriately.   Head and Face - Normocephalic and atraumatic, with no gross asymmetry noted.  The facial nerve is intact, with strong symmetric movements.  Voice and Breathing - The patient was breathing comfortably without the use of accessory muscles. There was no wheezing, stridor. The patients voice was clear and strong, and had appropriate pitch and quality.  Ears - The ears were examined under binocular microscopy and with otoscope.  External ear normal. Canals are patent. Right tympanic membrane is intact without effusion, retraction or mass. Left tympanic membrane is intact without effusion, retraction or mass.  No movement with valsalva bilaterally, but question technique.    Eyes - Extraocular movements intact, and the pupils were reactive to light. Sclera were not icteric or injected, conjunctiva were pink and moist.  Mouth - Examination of the oral cavity showed pink, healthy oral mucosa. Dentition in good repair. No lesions or ulcerations noted. The tongue was mobile and midline.   Throat - The walls of the oropharynx were smooth, pink, moist, symmetric, and had no lesions or ulcerations.  The tonsillar pillars and soft palate were symmetric. The uvula was midline on elevation.    Neck -   Full range of motion on passive movement.  Palpation of the occipital, submental, submandibular, internal jugular chain, and supraclavicular nodes did not demonstrate any abnormal lymph nodes or masses.  Palpation of the thyroid was soft and smooth, with no nodules or goiter appreciated.  The trachea was mobile and midline.  Nose - External contour is symmetric, no gross deflection or scars.  Nasal mucosa is pink and moist with no abnormal mucus.      To evaluate the nose and sinuses, I performed rigid nasal endoscopy.  I sprayed both nares with 2 sprays lidocaine and neosynephrine.     I began with the RIGHT side using a 0 degree rigid nasal endoscope, and then similarly examined the LEFT side      Findings: septum intact, deviated right  Inferior turbinates:  enlarged bilaterally without polypoid change  Middle turbinate and middle meatus:  Enlarged bilaterally,  No purulence, no polyposis  I was unable to view the sphenoethmoid cleft or the Nasopharynx on the right due to enlarged MT and discomfort.  Was able to view sphenoethmoidal cleft on the left, no purulence or polypoid change noted.    Nasopharynx clear, left ET patent, no drainage or space occupying mass noted in the NP  The patient tolerated the procedure well          Assessment and Plan:       ICD-10-CM    1. Dysfunction of right eustachian tube H69.81 fluticasone (FLONASE) 50 MCG/ACT nasal spray     cetirizine HCl 10 MG CAPS   2. Otalgia, right H92.01 fluticasone (FLONASE) 50 MCG/ACT nasal spray     cetirizine HCl 10 MG CAPS     Warm compresses to the right (or left if it is painful) jaw and ear for 30 minutes, 3-4 times daily  Zyrtec 10 mg at bedtime  Flonase 2 sprays to each nostril daily  Repeat Audiogram annually  Follow up in 3-4 weeks for recheck, sooner if symptoms worsen    Siena Skelton NP-C  [facility]

## 2020-01-27 NOTE — LETTER
1/27/2020         RE: Kayla Lugo  [address]  [place] [address]        Dear Colleague,    Thank you for referring your patient, Kayla Lugo, to the [facility]. Please see a copy of my visit note below.      Otolaryngology Note         Chief Complaint:     Patient presents with:  Hearing Problem: Patient here for f/u on bilateral hearing loss, pain           History of Present Illness:     Kayla Lugo is a 69 year old female seen today for hearing loss.  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  She was seen by dr Flores in 2014 and diagnosed with ASNHL, MRI was completed and negative for AN.      She note in August 2019 she noted pain in the left ear, was seen by Dr Starr, no infection  On January 5, noted pain in both ears.  She did not note a change in hearing at that time.  She has continued pain in the right ear since with occasional pain in the left ear. She have has been taking advil 200 mg twice daily for pain with improvement.   No otorrhea.    She feels that her left hearing has improved from when she first had the Sudden hearing loss.  She can hear people talking to her on the telephone on her left ear.    She has noticed some low grade tinnitus in the left ear, she states she has been paying attention and has now noticed it.  It is not bothersome to her.   No numbness or tingling in her face.  She does have some maxillary sinus pain/pressure at times.    No PND, but does feel she had in back in Decemeber prior to the ear pain  She denies dizziness/vertigo.  She has had Vertigo in the past.    No history of COM, otological surgery  No family history of hearing loss  There is no prior history of ear surgery or trauma to the ear   No noise exposure.    She denies known clenching or grinding, no history of TMJ    Prior audiogram 10/29/14:  No Tympanogram available  Thresholds are WNl range right ear and mold to moderate  SNHL left ear.    + recent inciting events had URI prior to onset of ear pain    Audiogram 1/15/2020:   Tympanograms are Type A for both ears suggesting normal eardrum mobility.  Acoustic Reflex Thresholds at 1000 Hz are present for both ears.  Thresholds are stable right in normal range and decreased left 6-8kHz with decreased word discrimination from 100% to 64%.    Previous imagin14 MRI IAC  No significant mastoid fluid is seen.  There is mucosal thickening and  probable bilateral maxillary sinus retention cysts.     IMPRESSION:  NO FINDING TO ACCOUNT FOR ASYMMETRIC SENSORINEURAL  HEARING LOSS.  Exam Date: Dec 23, 2014 05:21:28 PM  Author: JUSTICE CARVER         Medications:     Current Outpatient Rx   Medication Sig Dispense Refill     atorvastatin (LIPITOR) 10 MG tablet TAKE 1 TABLET BY MOUTH EVERY DAY 30 tablet 0     blood glucose (ACCU-CHEK SMARTVIEW) test strip Use to test blood sugar one time daily. 100 strip 1     blood glucose calibration (ACCU-CHEK SMARTVIEW CONTROL) solution Use to calibrate blood glucose monitor as directed. 1 Bottle 3     blood glucose monitoring (ACCU-CHEK FASTCLIX) lancets USE TO TEST BLOOD SUGARS ONCE DAILY 102 each 0     blood glucose monitoring (ACCU-CHEK DESTINI SMARTVIEW) meter device kit Use to test blood sugar one time daily. 1 kit 0     cetirizine HCl 10 MG CAPS Take 1 capsule (10 mg) by mouth At Bedtime 30 capsule 1     cholecalciferol (VITAMIN D3) 1000 UNIT tablet Take 4 tablets by mouth daily.       fluocinonide (LIDEX) 0.05 % ointment Apply topically 2 times daily       fluticasone (FLONASE) 50 MCG/ACT nasal spray Spray 2 sprays into both nostrils daily 18.2 mL 1     ibuprofen (ADVIL/MOTRIN) 600 MG tablet Take 1 tablet (600 mg) by mouth every 6 hours as needed for moderate pain 90 tablet 1     levothyroxine (SYNTHROID/LEVOTHROID) 100 MCG tablet TAKE 1 TABLET(100 MCG) BY MOUTH DAILY 30 tablet 0     metFORMIN (GLUCOPHAGE-XR) 500 MG 24 hr tablet TAKE 1 TABLET BY  "MOUTH EVERY MORNING AND 2 TABLETS BY MOUTH AT NIGHT 90 tablet 3     polyethylene glycol (MIRALAX/GLYCOLAX) powder TAKE 17 GRAMS BY MOUTH DAILY 510 g 2     triamcinolone (KENALOG) 0.1 % ointment Apply topically 2 times daily              Allergies:     Allergies: Hydrochlorothiazide; Atenolol; and Lisinopril          Past Medical History:     Past Medical History:   Diagnosis Date     Diabetes mellitus, type 2 (H) 10/22/2014     Eczema 06/09/2011     Esophageal reflux 06/09/2011     Helicobacter pylori (H. pylori) 06/09/2011    history of      Osteoarthrosis, unspecified whether generalized or localized, lower leg 03/15/2001    Yuval Rapp MD      Personal history of other endocrine, metabolic, and immunity disorders 06/09/2011     Unspecified acquired hypothyroidism 06/09/2011    Natalia Starr MD     Unspecified essential hypertension 03/27/2001    Geremias Rouse MD      Unspecified hearing loss, left 06/09/2011            Past Surgical History:     Past Surgical History:   Procedure Laterality Date     ARTHROSCOPY KNEE  2001    RT      COLONOSCOPY  2004     COLONOSCOPY  06/13/2012    Dr. Gorman; 5 year recall given     COLONOSCOPY  07/11/2017     colonoscopy with polypectomy  2007    repeat in five      DILATION AND CURETTAGE  1996     EXCISE LESION LIP Right 9/16/2014    Procedure: EXCISE LESION LIP;  Surgeon: Bri Flores MD;  Location: [facility]     ORTHOPEDIC SURGERY Right 10-5-2015    right shoulder arthoscopy RCR     NADIRA with BSO  1997       ENT family history reviewed         Social History:     Social History     Tobacco Use     Smoking status: Never Smoker     Smokeless tobacco: Never Used   Substance Use Topics     Alcohol use: No     Alcohol/week: 0.0 standard drinks     Drug use: No            Review of Systems:     ROS: See HPI         Physical Exam:     BP (!) 148/80 (BP Location: Right arm, Cuff Size: Adult Regular)   Pulse 70   Temp 98  F (36.7  C) (Tympanic)   Ht 1.651 m (5' 5\")  "  Wt 83.5 kg (184 lb)   SpO2 96%   BMI 30.62 kg/m       General - The patient is well nourished and well developed, and appears to have good nutritional status.  Alert and oriented to person and place, answers questions and cooperates with examination appropriately.   Head and Face - Normocephalic and atraumatic, with no gross asymmetry noted.  The facial nerve is intact, with strong symmetric movements.  Voice and Breathing - The patient was breathing comfortably without the use of accessory muscles. There was no wheezing, stridor. The patients voice was clear and strong, and had appropriate pitch and quality.  Ears - The ears were examined under binocular microscopy and with otoscope.  External ear normal. Canals are patent. Right tympanic membrane is intact without effusion, retraction or mass. Left tympanic membrane is intact without effusion, retraction or mass.  No movement with valsalva bilaterally, but question technique.    Eyes - Extraocular movements intact, and the pupils were reactive to light. Sclera were not icteric or injected, conjunctiva were pink and moist.  Mouth - Examination of the oral cavity showed pink, healthy oral mucosa. Dentition in good repair. No lesions or ulcerations noted. The tongue was mobile and midline.   Throat - The walls of the oropharynx were smooth, pink, moist, symmetric, and had no lesions or ulcerations.  The tonsillar pillars and soft palate were symmetric. The uvula was midline on elevation.    Neck -   Full range of motion on passive movement.  Palpation of the occipital, submental, submandibular, internal jugular chain, and supraclavicular nodes did not demonstrate any abnormal lymph nodes or masses.  Palpation of the thyroid was soft and smooth, with no nodules or goiter appreciated.  The trachea was mobile and midline.  Nose - External contour is symmetric, no gross deflection or scars.  Nasal mucosa is pink and moist with no abnormal mucus.      To evaluate the  nose and sinuses, I performed rigid nasal endoscopy.  I sprayed both nares with 2 sprays lidocaine and neosynephrine.     I began with the RIGHT side using a 0 degree rigid nasal endoscope, and then similarly examined the LEFT side     Findings: septum intact, deviated right  Inferior turbinates:  enlarged bilaterally without polypoid change  Middle turbinate and middle meatus:  Enlarged bilaterally,  No purulence, no polyposis  I was unable to view the sphenoethmoid cleft or the Nasopharynx on the right due to enlarged MT and discomfort.  Was able to view sphenoethmoidal cleft on the left, no purulence or polypoid change noted.    Nasopharynx clear, left ET patent, no drainage or space occupying mass noted in the NP  The patient tolerated the procedure well          Assessment and Plan:       ICD-10-CM    1. Dysfunction of right eustachian tube H69.81 fluticasone (FLONASE) 50 MCG/ACT nasal spray     cetirizine HCl 10 MG CAPS   2. Otalgia, right H92.01 fluticasone (FLONASE) 50 MCG/ACT nasal spray     cetirizine HCl 10 MG CAPS     Warm compresses to the right (or left if it is painful) jaw and ear for 30 minutes, 3-4 times daily  Zyrtec 10 mg at bedtime  Flonase 2 sprays to each nostril daily  Repeat Audiogram annually  Follow up in 3-4 weeks for recheck, sooner if symptoms worsen    Siena CRISTINA  [facility]NT      Again, thank you for allowing me to participate in the care of your patient.        Sincerely,        Siena Skelton NP

## 2020-01-27 NOTE — NURSING NOTE
"Chief Complaint   Patient presents with     Hearing Problem     Patient here for f/u on bilateral hearing loss, pain       Initial BP (!) 148/80 (BP Location: Right arm, Cuff Size: Adult Regular)   Pulse 70   Temp 98  F (36.7  C) (Tympanic)   Ht 1.651 m (5' 5\")   Wt 83.5 kg (184 lb)   SpO2 96%   BMI 30.62 kg/m   Estimated body mass index is 30.62 kg/m  as calculated from the following:    Height as of this encounter: 1.651 m (5' 5\").    Weight as of this encounter: 83.5 kg (184 lb).  Medication Reconciliation: complete  Ila Mireles LPN    "

## 2020-01-27 NOTE — PATIENT INSTRUCTIONS
Thank you for allowing Siena CRISTINA and our ENT team to participate in your care.  If your medications are too expensive, please give the nurse a call.  We can possibly change this medication.  If you have a scheduling or an appointment question please contact our Health Unit Coordinator at their direct line [phone].   ALL nursing questions or concerns can be directed to your ENT nurse at: [phone] (Ila) or [phone] (Atiya)    Warm compresses to the right (or left if it is painful) jaw and ear for 30 minutes, 3-4 times daily  Zyrtec 10 mg at bedtime  Flonase 2 sprays to each nostril daily  Repeat Audiogram annually  Follow up in 3-4 weeks for recheck, sooner if symptoms worsen

## 2020-02-11 ENCOUNTER — OFFICE VISIT (OUTPATIENT)
Dept: OTOLARYNGOLOGY | Facility: OTHER | Age: 70
End: 2020-02-11
Attending: NURSE PRACTITIONER
Payer: MEDICARE

## 2020-02-11 VITALS
DIASTOLIC BLOOD PRESSURE: 88 MMHG | WEIGHT: 184 LBS | BODY MASS INDEX: 30.66 KG/M2 | RESPIRATION RATE: 97 BRPM | HEIGHT: 65 IN | TEMPERATURE: 98.3 F | OXYGEN SATURATION: 97 % | HEART RATE: 77 BPM | SYSTOLIC BLOOD PRESSURE: 132 MMHG

## 2020-02-11 DIAGNOSIS — Z01.10 NORMAL EAR EXAM: Primary | ICD-10-CM

## 2020-02-11 PROCEDURE — 99212 OFFICE O/P EST SF 10 MIN: CPT | Performed by: NURSE PRACTITIONER

## 2020-02-11 PROCEDURE — G0463 HOSPITAL OUTPT CLINIC VISIT: HCPCS

## 2020-02-11 ASSESSMENT — PAIN SCALES - GENERAL: PAINLEVEL: NO PAIN (0)

## 2020-02-11 ASSESSMENT — MIFFLIN-ST. JEOR: SCORE: 1365.5

## 2020-02-11 NOTE — LETTER
"    2/11/2020         RE: Kayla Lugo  [address]  [place] [address]        Dear Colleague,    Thank you for referring your patient, Kayla Lugo, to the [facility]. Please see a copy of my visit note below.    Otolaryngology Note         Chief Complaint:     Patient presents with:  Hearing Problem: Patient here for f/u bilat. hearing loss, pain            History of Present Illness:     Kayla Lugo is a 69 year old female seen today for follow up of right otalgia.  She was started on flonase and warm compresses to the right jaw.   She reports ear pain and pressure is resolved.  Hearing has been stable.  She reports \"the medication was a miracle med\".  She denies any further complaints.           Medications:     Current Outpatient Rx   Medication Sig Dispense Refill     atorvastatin (LIPITOR) 10 MG tablet TAKE 1 TABLET BY MOUTH EVERY DAY 30 tablet 0     blood glucose (ACCU-CHEK SMARTVIEW) test strip Use to test blood sugar one time daily. 100 strip 1     blood glucose calibration (ACCU-CHEK SMARTVIEW CONTROL) solution Use to calibrate blood glucose monitor as directed. 1 Bottle 3     blood glucose monitoring (ACCU-CHEK FASTCLIX) lancets USE TO TEST BLOOD SUGARS ONCE DAILY 102 each 0     blood glucose monitoring (ACCU-CHEK DESTINI SMARTVIEW) meter device kit Use to test blood sugar one time daily. 1 kit 0     cetirizine HCl 10 MG CAPS Take 1 capsule (10 mg) by mouth At Bedtime 30 capsule 1     cholecalciferol (VITAMIN D3) 1000 UNIT tablet Take 4 tablets by mouth daily.       fluticasone (FLONASE) 50 MCG/ACT nasal spray Spray 2 sprays into both nostrils daily 18.2 mL 1     ibuprofen (ADVIL/MOTRIN) 600 MG tablet Take 1 tablet (600 mg) by mouth every 6 hours as needed for moderate pain 90 tablet 1     levothyroxine (SYNTHROID/LEVOTHROID) 100 MCG tablet Take 1 tablet (100 mcg) by mouth daily 90 tablet 1     metFORMIN (GLUCOPHAGE-XR) 500 MG 24 hr tablet Take 1 tablet (500 mg) by " mouth every morning AND 2 tablets (1,000 mg) daily (with dinner). 270 tablet 1     polyethylene glycol (MIRALAX/GLYCOLAX) powder TAKE 17 GRAMS BY MOUTH DAILY 510 g 2     triamcinolone (KENALOG) 0.1 % ointment Apply topically 2 times daily       fluocinonide (LIDEX) 0.05 % ointment Apply topically 2 times daily              Allergies:     Allergies: Hydrochlorothiazide; Atenolol; and Lisinopril          Past Medical History:     Past Medical History:   Diagnosis Date     Diabetes mellitus, type 2 (H) 10/22/2014     Eczema 06/09/2011     Esophageal reflux 06/09/2011     Helicobacter pylori (H. pylori) 06/09/2011    history of      Osteoarthrosis, unspecified whether generalized or localized, lower leg 03/15/2001    Yuval Rpap MD      Personal history of other endocrine, metabolic, and immunity disorders 06/09/2011     Unspecified acquired hypothyroidism 06/09/2011    Natalia Starr MD     Unspecified essential hypertension 03/27/2001    Geremias Rouse MD      Unspecified hearing loss, left 06/09/2011            Past Surgical History:     Past Surgical History:   Procedure Laterality Date     ARTHROSCOPY KNEE  2001    RT      COLONOSCOPY  2004     COLONOSCOPY  06/13/2012    Dr. Gorman; 5 year recall given     COLONOSCOPY  07/11/2017     colonoscopy with polypectomy  2007    repeat in five      DILATION AND CURETTAGE  1996     EXCISE LESION LIP Right 9/16/2014    Procedure: EXCISE LESION LIP;  Surgeon: Bri Flores MD;  Location: [facility]     ORTHOPEDIC SURGERY Right 10-5-2015    right shoulder arthoscopy [facility]     NADIRA with BSO  1997       ENT family history reviewed         Social History:     Social History     Tobacco Use     Smoking status: Never Smoker     Smokeless tobacco: Never Used   Substance Use Topics     Alcohol use: No     Alcohol/week: 0.0 standard drinks     Drug use: No            Review of Systems:     ROS: See HPI         Physical Exam:     /88 (BP Location: Left arm)   Pulse 77   " Temp 98.3  F (36.8  C) (Tympanic)   Resp (!) 97   Ht 1.651 m (5' 5\")   Wt 83.5 kg (184 lb)   SpO2 97%   BMI 30.62 kg/m     General - The patient is well nourished and well developed, and appears to have good nutritional status.  Alert and oriented to person and place, answers questions and cooperates with examination appropriately.   Head and Face - Normocephalic and atraumatic, with no gross asymmetry noted.  The facial nerve is intact, with strong symmetric movements.  Voice and Breathing - The patient was breathing comfortably without the use of accessory muscles. There was no wheezing, stridor. The patients voice was clear and strong, and had appropriate pitch and quality.  Ears -  External ear normal. Canals are patent. Right tympanic membrane is intact without effusion, retraction or mass. Left tympanic membrane is intact without effusion, retraction or mass.    Eyes - Extraocular movements intact, sclera were not icteric or injected, conjunctiva were pink and moist.  Mouth - Examination of the oral cavity showed pink, healthy oral mucosa. Dentition in good repair. No lesions or ulcerations noted. The tongue was mobile and midline.   Throat - The walls of the oropharynx were smooth, pink, moist, symmetric, and had no lesions or ulcerations.  The tonsillar pillars and soft palate were symmetric. The uvula was midline on elevation.    Neck -   Full range of motion on passive movement.  Palpation of the occipital, submental, submandibular, internal jugular chain, and supraclavicular nodes did not demonstrate any abnormal lymph nodes or masses.  Palpation of the thyroid was soft and smooth, with no nodules or goiter appreciated.  The trachea was mobile and midline.  Nose - External contour is symmetric, no gross deflection or scars.  Nasal mucosa is pink and moist with no abnormal mucus.           Assessment and Plan:       ICD-10-CM    1. Normal ear exam Z01.10        Continue Zyrtec for 1-2 more weeks, " then may wean off, if symptoms come back, start zyrtec and flonase again.   Follow up in 12 months for Audiogram      Again, thank you for allowing me to participate in the care of your patient.        Sincerely,        Siena Skelton, NP

## 2020-02-11 NOTE — NURSING NOTE
"Chief Complaint   Patient presents with     Hearing Problem     Patient here for f/u bilat. hearing loss, pain        Initial /88 (BP Location: Left arm)   Pulse 77   Temp 98.3  F (36.8  C) (Tympanic)   Resp (!) 97   Ht 1.651 m (5' 5\")   Wt 83.5 kg (184 lb)   SpO2 97%   BMI 30.62 kg/m   Estimated body mass index is 30.62 kg/m  as calculated from the following:    Height as of this encounter: 1.651 m (5' 5\").    Weight as of this encounter: 83.5 kg (184 lb).  Medication Reconciliation: complete  Zeynep Guillen LPN  "

## 2020-02-11 NOTE — PROGRESS NOTES
"Otolaryngology Note         Chief Complaint:     Patient presents with:  Hearing Problem: Patient here for f/u bilat. hearing loss, pain            History of Present Illness:     Kayla Lugo is a 69 year old female seen today for follow up of right otalgia.  She was started on flonase and warm compresses to the right jaw.   She reports ear pain and pressure is resolved.  Hearing has been stable.  She reports \"the medication was a miracle med\".  She denies any further complaints.           Medications:     Current Outpatient Rx   Medication Sig Dispense Refill     atorvastatin (LIPITOR) 10 MG tablet TAKE 1 TABLET BY MOUTH EVERY DAY 30 tablet 0     blood glucose (ACCU-CHEK SMARTVIEW) test strip Use to test blood sugar one time daily. 100 strip 1     blood glucose calibration (ACCU-CHEK SMARTVIEW CONTROL) solution Use to calibrate blood glucose monitor as directed. 1 Bottle 3     blood glucose monitoring (ACCU-CHEK FASTCLIX) lancets USE TO TEST BLOOD SUGARS ONCE DAILY 102 each 0     blood glucose monitoring (ACCU-CHEK DESTINI SMARTVIEW) meter device kit Use to test blood sugar one time daily. 1 kit 0     cetirizine HCl 10 MG CAPS Take 1 capsule (10 mg) by mouth At Bedtime 30 capsule 1     cholecalciferol (VITAMIN D3) 1000 UNIT tablet Take 4 tablets by mouth daily.       fluticasone (FLONASE) 50 MCG/ACT nasal spray Spray 2 sprays into both nostrils daily 18.2 mL 1     ibuprofen (ADVIL/MOTRIN) 600 MG tablet Take 1 tablet (600 mg) by mouth every 6 hours as needed for moderate pain 90 tablet 1     levothyroxine (SYNTHROID/LEVOTHROID) 100 MCG tablet Take 1 tablet (100 mcg) by mouth daily 90 tablet 1     metFORMIN (GLUCOPHAGE-XR) 500 MG 24 hr tablet Take 1 tablet (500 mg) by mouth every morning AND 2 tablets (1,000 mg) daily (with dinner). 270 tablet 1     polyethylene glycol (MIRALAX/GLYCOLAX) powder TAKE 17 GRAMS BY MOUTH DAILY 510 g 2     triamcinolone (KENALOG) 0.1 % ointment Apply topically 2 times daily       " "fluocinonide (LIDEX) 0.05 % ointment Apply topically 2 times daily              Allergies:     Allergies: Hydrochlorothiazide; Atenolol; and Lisinopril          Past Medical History:     Past Medical History:   Diagnosis Date     Diabetes mellitus, type 2 (H) 10/22/2014     Eczema 06/09/2011     Esophageal reflux 06/09/2011     Helicobacter pylori (H. pylori) 06/09/2011    history of      Osteoarthrosis, unspecified whether generalized or localized, lower leg 03/15/2001    Yuval Rapp MD      Personal history of other endocrine, metabolic, and immunity disorders 06/09/2011     Unspecified acquired hypothyroidism 06/09/2011    Natalia Starr MD     Unspecified essential hypertension 03/27/2001    Geremias Rouse MD      Unspecified hearing loss, left 06/09/2011            Past Surgical History:     Past Surgical History:   Procedure Laterality Date     ARTHROSCOPY KNEE  2001    RT      COLONOSCOPY  2004     COLONOSCOPY  06/13/2012    Dr. Gorman; 5 year recall given     COLONOSCOPY  07/11/2017     colonoscopy with polypectomy  2007    repeat in five      DILATION AND CURETTAGE  1996     EXCISE LESION LIP Right 9/16/2014    Procedure: EXCISE LESION LIP;  Surgeon: Bri Flores MD;  Location: [facility]     ORTHOPEDIC SURGERY Right 10-5-2015    right shoulder arthoscopy RCR     NADIRA with BSO  1997       ENT family history reviewed         Social History:     Social History     Tobacco Use     Smoking status: Never Smoker     Smokeless tobacco: Never Used   Substance Use Topics     Alcohol use: No     Alcohol/week: 0.0 standard drinks     Drug use: No            Review of Systems:     ROS: See HPI         Physical Exam:     /88 (BP Location: Left arm)   Pulse 77   Temp 98.3  F (36.8  C) (Tympanic)   Resp (!) 97   Ht 1.651 m (5' 5\")   Wt 83.5 kg (184 lb)   SpO2 97%   BMI 30.62 kg/m    General - The patient is well nourished and well developed, and appears to have good nutritional status.  Alert and " oriented to person and place, answers questions and cooperates with examination appropriately.   Head and Face - Normocephalic and atraumatic, with no gross asymmetry noted.  The facial nerve is intact, with strong symmetric movements.  Voice and Breathing - The patient was breathing comfortably without the use of accessory muscles. There was no wheezing, stridor. The patients voice was clear and strong, and had appropriate pitch and quality.  Ears -  External ear normal. Canals are patent. Right tympanic membrane is intact without effusion, retraction or mass. Left tympanic membrane is intact without effusion, retraction or mass.    Eyes - Extraocular movements intact, sclera were not icteric or injected, conjunctiva were pink and moist.  Mouth - Examination of the oral cavity showed pink, healthy oral mucosa. Dentition in good repair. No lesions or ulcerations noted. The tongue was mobile and midline.   Throat - The walls of the oropharynx were smooth, pink, moist, symmetric, and had no lesions or ulcerations.  The tonsillar pillars and soft palate were symmetric. The uvula was midline on elevation.    Neck -   Full range of motion on passive movement.  Palpation of the occipital, submental, submandibular, internal jugular chain, and supraclavicular nodes did not demonstrate any abnormal lymph nodes or masses.  Palpation of the thyroid was soft and smooth, with no nodules or goiter appreciated.  The trachea was mobile and midline.  Nose - External contour is symmetric, no gross deflection or scars.  Nasal mucosa is pink and moist with no abnormal mucus.           Assessment and Plan:       ICD-10-CM    1. Normal ear exam Z01.10        Continue Zyrtec for 1-2 more weeks, then may wean off, if symptoms come back, start zyrtec and flonase again.   Follow up in 12 months for Audiogram

## 2020-02-11 NOTE — PATIENT INSTRUCTIONS
Continue Zyrtec for 1-2 more weeks, then may wean off, if symptoms come back, start zyrtec and flonase again.   Follow up in 12 months for Audiogram    Thank you for allowing Siena Skelton NP and our ENT team to participate in your care.  If your medications are too expensive, please give the nurse a call.  We can possibly change this medication.  If you have a scheduling or an appointment question please contact our Health Unit Coordinator at their direct line [phone].   ALL nursing questions or concerns can be directed to your ENT nurse at: [phone] Jayce Adhikari

## 2020-03-02 ENCOUNTER — HEALTH MAINTENANCE LETTER (OUTPATIENT)
Age: 70
End: 2020-03-02

## 2020-03-26 DIAGNOSIS — E78.5 HYPERLIPIDEMIA, UNSPECIFIED HYPERLIPIDEMIA TYPE: ICD-10-CM

## 2020-03-26 DIAGNOSIS — H69.91 DYSFUNCTION OF RIGHT EUSTACHIAN TUBE: ICD-10-CM

## 2020-03-26 DIAGNOSIS — H92.01 OTALGIA, RIGHT: ICD-10-CM

## 2020-03-26 RX ORDER — FLUTICASONE PROPIONATE 50 MCG
SPRAY, SUSPENSION (ML) NASAL
Qty: 16 G | Refills: 1 | Status: SHIPPED | OUTPATIENT
Start: 2020-03-26 | End: 2020-03-26

## 2020-03-26 RX ORDER — ATORVASTATIN CALCIUM 10 MG/1
TABLET, FILM COATED ORAL
Qty: 90 TABLET | Refills: 0 | Status: SHIPPED | OUTPATIENT
Start: 2020-03-26 | End: 2020-06-25

## 2020-03-26 RX ORDER — FLUTICASONE PROPIONATE 50 MCG
SPRAY, SUSPENSION (ML) NASAL
Qty: 48 G | Refills: 0 | Status: SHIPPED | OUTPATIENT
Start: 2020-03-26 | End: 2020-12-01

## 2020-03-26 NOTE — TELEPHONE ENCOUNTER
lipitor      Last Written Prescription Date:  11/7/2019  Last Fill Quantity: 30 tab,   # refills: 0  Last Office Visit: 11/25/2019  Future Office visit:       Routing refill request to provider for review/approval because:

## 2020-03-26 NOTE — TELEPHONE ENCOUNTER
Fluticasone      Last Written Prescription Date:  1/27/2020  Last Fill Quantity: 18.2mL,   # refills: 1  Last Office Visit: 11/25/2019  Future Office visit:       Routing refill request to provider for review/approval because:

## 2020-06-25 DIAGNOSIS — E78.5 HYPERLIPIDEMIA, UNSPECIFIED HYPERLIPIDEMIA TYPE: ICD-10-CM

## 2020-06-25 RX ORDER — ATORVASTATIN CALCIUM 10 MG/1
TABLET, FILM COATED ORAL
Qty: 90 TABLET | Refills: 0 | Status: SHIPPED | OUTPATIENT
Start: 2020-06-25 | End: 2020-09-24

## 2020-07-29 ENCOUNTER — OFFICE VISIT (OUTPATIENT)
Dept: DERMATOLOGY | Facility: OTHER | Age: 70
End: 2020-07-29
Attending: DERMATOLOGY
Payer: MEDICARE

## 2020-07-29 VITALS
DIASTOLIC BLOOD PRESSURE: 78 MMHG | TEMPERATURE: 98.8 F | HEART RATE: 73 BPM | OXYGEN SATURATION: 98 % | SYSTOLIC BLOOD PRESSURE: 136 MMHG | RESPIRATION RATE: 16 BRPM

## 2020-07-29 DIAGNOSIS — Z12.83 SCREENING FOR SKIN CANCER: ICD-10-CM

## 2020-07-29 DIAGNOSIS — Z85.828 HISTORY OF NONMELANOMA SKIN CANCER: Primary | ICD-10-CM

## 2020-07-29 PROCEDURE — 99213 OFFICE O/P EST LOW 20 MIN: CPT | Performed by: DERMATOLOGY

## 2020-07-29 PROCEDURE — G0463 HOSPITAL OUTPT CLINIC VISIT: HCPCS

## 2020-07-29 ASSESSMENT — PAIN SCALES - GENERAL: PAINLEVEL: NO PAIN (0)

## 2020-07-29 NOTE — NURSING NOTE
"Chief Complaint   Patient presents with     Skin Check       Initial /78   Pulse 73   Temp 98.8  F (37.1  C) (Tympanic)   Resp 16   SpO2 98%  Estimated body mass index is 30.62 kg/m  as calculated from the following:    Height as of 2/11/20: 1.651 m (5' 5\").    Weight as of 2/11/20: 83.5 kg (184 lb).  Medication Reconciliation: complete  Siena Ramírez LPN    "

## 2020-07-29 NOTE — CONSULTS
Consult Date:  07/29/2020      SUBJECTIVE:  Soni returns for an annual check.  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      OBJECTIVE:     GENERAL:  Exam shows a healthy lady in no distress.     SKIN:  The right upper lip area does show some atrophy with telangiectasia, but there is no evidence of recurrence of tumor.  A small papule at the antrum of the nose, which we checked his last visit, still is present and is perhaps slightly larger, but looks domed, symmetrical and harmless.      She has developed a new lesion on her forehead, which is an approximately 2 cm round lesion, mostly brown.  Close inspection suggest that this is either a developing lentigo or a cluster of seborrheic keratoses in a round configuration.  I did not appreciate the features of a superficial basal cell and this lesion does not appear to be anything like a melanoma, and so I asked that she simply watch this and should it enlarge or darken return for reevaluation.      We checked her face and neck carefully and found no worrisome lesions or actinic damage.  She has had some irritation of her eyelids, but that has gone, she states.  We checked her back and there she showed a few scattered seborrheic keratoses, but nothing concerning.  We checked the V of her neck, which is a  high risk region, and she had no lesions there of concern.  We did not do a full skin exam, but we did discuss what to look for with basal, squamous, and melanoma skin cancers.      ASSESSMENT:  No worrisome lesions.  New lesion on the forehead.  We will see next year.  No evidence of recurrence of basal cell on her lip.  Return in 1 year.      Meds and allergies reviewed.         JELANI CALL MD             D: 07/29/2020   T: 07/29/2020   MT: [id number]      Name:     SONI LOCKWOOD   MRN:      [id number]        Account:        [id number]   :      1950           Consult Date:  2020      Document: [id number]       cc: Natalia Starr MD

## 2020-07-29 NOTE — LETTER
7/29/2020       RE: Kayla Lugo  [address] [place] [address] [address]  [facility] [address]     Dear Colleague,    Thank you for referring your patient, Kayla Lugo, to the [facility]. Please see a copy of my visit note below.    Dictated and edited    Again, thank you for allowing me to participate in the care of your patient.      Sincerely,    JELANI Amezquita MD

## 2020-08-07 DIAGNOSIS — E11.9 TYPE 2 DIABETES MELLITUS WITHOUT COMPLICATION, WITHOUT LONG-TERM CURRENT USE OF INSULIN (H): ICD-10-CM

## 2020-08-07 NOTE — TELEPHONE ENCOUNTER
metFORMIN (GLUCOPHAGE-XR) 500 MG 24 hr tablet      Last Written Prescription Date:  2/7/20  Last Fill Quantity: 270,   # refills: 1  Last Office Visit: 11/25/2019  Future Office visit:

## 2020-08-10 RX ORDER — METFORMIN HCL 500 MG
TABLET, EXTENDED RELEASE 24 HR ORAL
Qty: 270 TABLET | Refills: 0 | Status: SHIPPED | OUTPATIENT
Start: 2020-08-10 | End: 2020-11-12

## 2020-08-31 DIAGNOSIS — E03.9 HYPOTHYROIDISM, UNSPECIFIED TYPE: ICD-10-CM

## 2020-08-31 RX ORDER — LEVOTHYROXINE SODIUM 100 UG/1
TABLET ORAL
Qty: 90 TABLET | Refills: 0 | Status: SHIPPED | OUTPATIENT
Start: 2020-08-31 | End: 2020-11-30

## 2020-08-31 NOTE — TELEPHONE ENCOUNTER
Levothyroxine       Last Written Prescription Date:  2/07/2020  Last Fill Quantity: 90,   # refills: 1  Last Office Visit: 11/25/2019  Future Office visit:

## 2020-09-24 DIAGNOSIS — E78.5 HYPERLIPIDEMIA, UNSPECIFIED HYPERLIPIDEMIA TYPE: ICD-10-CM

## 2020-09-24 RX ORDER — ATORVASTATIN CALCIUM 10 MG/1
TABLET, FILM COATED ORAL
Qty: 90 TABLET | Refills: 0 | Status: SHIPPED | OUTPATIENT
Start: 2020-09-24 | End: 2020-12-01

## 2020-09-24 NOTE — TELEPHONE ENCOUNTER
Lipitor  Last Written Prescription Date: 6/25/20  Last Fill Quantity: 90 # of Refills: 0  Last Office Visit: 11/25/19

## 2020-11-07 DIAGNOSIS — E11.9 TYPE 2 DIABETES MELLITUS WITHOUT COMPLICATION, WITHOUT LONG-TERM CURRENT USE OF INSULIN (H): ICD-10-CM

## 2020-11-09 ENCOUNTER — TRANSFERRED RECORDS (OUTPATIENT)
Dept: HEALTH INFORMATION MANAGEMENT | Facility: CLINIC | Age: 70
End: 2020-11-09

## 2020-11-09 LAB — RETINOPATHY: NEGATIVE

## 2020-11-09 NOTE — TELEPHONE ENCOUNTER
metformin      Last Written Prescription Date:  8/10/2020  Last Fill Quantity: 270,   # refills: 0  Last Office Visit: 11/25/19  Future Office visit:

## 2020-11-12 RX ORDER — METFORMIN HCL 500 MG
TABLET, EXTENDED RELEASE 24 HR ORAL
Qty: 270 TABLET | Refills: 0 | Status: SHIPPED | OUTPATIENT
Start: 2020-11-12 | End: 2020-12-01

## 2020-11-28 DIAGNOSIS — E03.9 HYPOTHYROIDISM, UNSPECIFIED TYPE: ICD-10-CM

## 2020-11-30 RX ORDER — LEVOTHYROXINE SODIUM 100 UG/1
TABLET ORAL
Qty: 90 TABLET | Refills: 0 | Status: SHIPPED | OUTPATIENT
Start: 2020-11-30 | End: 2021-03-03

## 2020-11-30 NOTE — PROGRESS NOTES
Subjective     Kayla Lugo is a 70 year old female who presents to clinic today for the following health issues:    HPI         Diabetes Follow-up    How often are you checking your blood sugar? Two times daily  Blood sugar testing frequency justification:  Uncontrolled diabetes  What time of day are you checking your blood sugars (select all that apply)?  Before meals and After meals  Have you had any blood sugars above 200?  No  Have you had any blood sugars below 70?  No    What symptoms do you notice when your blood sugar is low?  None    What concerns do you have today about your diabetes? Low blood sugar     Do you have any of these symptoms? (Select all that apply)  No numbness or tingling in feet.  No redness, sores or blisters on feet.  No complaints of excessive thirst.  No reports of blurry vision.  No significant changes to weight.      Hyperlipidemia Follow-Up      Are you regularly taking any medication or supplement to lower your cholesterol?   Yes- atorvastatin    Are you having muscle aches or other side effects that you think could be caused by your cholesterol lowering medication?  No    Hypertension Follow-up      Do you check your blood pressure regularly outside of the clinic? No     Are you following a low salt diet? Yes    Are your blood pressures ever more than 140 on the top number (systolic) OR more   than 90 on the bottom number (diastolic), for example 140/90? No   Patient has had normal BP readings at home, this morning was 120/80    BP Readings from Last 2 Encounters:   12/01/20 (!) 144/92   07/29/20 136/78     Hemoglobin A1C (%)   Date Value   12/01/2020 5.8 (H)   11/25/2019 5.6     LDL Cholesterol Calculated (mg/dL)   Date Value   12/01/2020 71   11/25/2019 66       Hypothyroidism Follow-up      Since last visit, patient describes the following symptoms: Weight stable, no hair loss, no skin changes, no constipation, no loose stools        How many servings of fruits and vegetables  do you eat daily?  2-3    On average, how many sweetened beverages do you drink each day (Examples: soda, juice, sweet tea, etc.  Do NOT count diet or artificially sweetened beverages)?   0    How many days per week do you exercise enough to make your heart beat faster? 3 or less    How many minutes a day do you exercise enough to make your heart beat faster? 9 or less    How many days per week do you miss taking your medication? 0      Patient Active Problem List   Diagnosis     Essential hypertension     Hypothyroidism     Esophageal reflux     Osteoarthrosis, unspecified whether generalized or localized, involving lower leg     Eczema     Hearing loss     Advanced care planning/counseling discussion     Skin lesion of face     Basal cell carcinoma of upper lip     S/P excision of skin lesion, follow-up exam     Type 2 diabetes mellitus without complication, without long-term current use of insulin (H)     History of labyrinthitis     Hyperlipidemia, unspecified hyperlipidemia type     Adjustment disorder with depressed mood     Dysmetabolic syndrome X     Family history of ischemic heart disease     Obesity     Personal history of colonic polyps     Personal history of other malignant neoplasm of skin     Photokeratitis     Routine general medical examination at a health care facility     Personal history of other disorders of nervous system and sense organs     Past Surgical History:   Procedure Laterality Date     ARTHROSCOPY KNEE  2001    RT      COLONOSCOPY  2004     COLONOSCOPY  06/13/2012    Dr. Gorman; 5 year recall given     COLONOSCOPY  07/11/2017     colonoscopy with polypectomy  2007    repeat in five      DILATION AND CURETTAGE  1996     EXCISE LESION LIP Right 9/16/2014    Procedure: EXCISE LESION LIP;  Surgeon: Bri Flores MD;  Location: [facility]     ORTHOPEDIC SURGERY Right 10-5-2015    right shoulder arthoscopy [facility]     NADIRA with BSO  1997       Social History     Tobacco Use     Smoking  status: Never Smoker     Smokeless tobacco: Never Used   Substance Use Topics     Alcohol use: No     Alcohol/week: 0.0 standard drinks     Family History   Problem Relation Age of Onset     Alcohol/Drug Father         alcoholism     C.A.D. Father         (cause of death)      Cancer Mother         basal cell     Ovarian Cancer Mother      Alcohol/Drug Son         alcoholism      C.A.D. Son      Diabetes Brother      Diabetes Maternal Grandmother            Current Outpatient Medications   Medication Sig Dispense Refill     atorvastatin (LIPITOR) 10 MG tablet Take 1 tablet (10 mg) by mouth daily 90 tablet 3     blood glucose (ACCU-CHEK SMARTVIEW) test strip Use to test blood sugar one time daily. 100 strip 3     blood glucose calibration (ACCU-CHEK SMARTVIEW CONTROL) solution Use to calibrate blood glucose monitor as directed. 1 Bottle 3     blood glucose monitoring (ACCU-CHEK FASTCLIX) lancets USE TO TEST BLOOD SUGARS ONCE DAILY 102 each 3     blood glucose monitoring (ACCU-CHEK DESTINI SMARTVIEW) meter device kit Use to test blood sugar one time daily. 1 kit 0     cholecalciferol (VITAMIN D3) 1000 UNIT tablet Take 4 tablets by mouth daily.       levothyroxine (SYNTHROID/LEVOTHROID) 100 MCG tablet TAKE 1 TABLET(100 MCG) BY MOUTH DAILY 90 tablet 0     metFORMIN (GLUCOPHAGE-XR) 500 MG 24 hr tablet Take 2 tablets (1,000 mg) by mouth every evening 180 tablet 1     polyethylene glycol (MIRALAX/GLYCOLAX) powder TAKE 17 GRAMS BY MOUTH DAILY 510 g 2     fluocinonide (LIDEX) 0.05 % ointment Apply topically 2 times daily       triamcinolone (KENALOG) 0.1 % ointment Apply topically 2 times daily       Allergies   Allergen Reactions     Hydrochlorothiazide Rash     Atenolol Other (See Comments)     Bradycardia      Lisinopril Cough       Family History, Social History, Tobacco Use are all reviewed and updated      Review of Systems   Constitutional, HEENT, cardiovascular, pulmonary, gi and gu systems are negative, except as  "otherwise noted.      Objective    BP (!) 144/92 (BP Location: Right arm, Patient Position: Chair, Cuff Size: Adult Regular)   Pulse 70   Temp 98.2  F (36.8  C) (Tympanic)   Ht 1.651 m (5' 5\")   Wt 86.2 kg (190 lb)   SpO2 99%   BMI 31.62 kg/m    Body mass index is 31.62 kg/m .  Physical Exam   GENERAL: healthy, alert and no distress  PSYCH: mentation appears normal, affect normal/bright          Assessment & Plan     1. Type 2 diabetes mellitus without complication, without long-term current use of insulin (H)  Labs updated and medications refilled.  Follow-up in one year, sooner as needed.  - Albumin Random Urine Quantitative with Creat Ratio  - Hemoglobin A1c  - metFORMIN (GLUCOPHAGE-XR) 500 MG 24 hr tablet; Take 2 tablets (1,000 mg) by mouth every evening  Dispense: 180 tablet; Refill: 1  - blood glucose (ACCU-CHEK SMARTVIEW) test strip; Use to test blood sugar one time daily.  Dispense: 100 strip; Refill: 3  - blood glucose monitoring (ACCU-CHEK FASTCLIX) lancets; USE TO TEST BLOOD SUGARS ONCE DAILY  Dispense: 102 each; Refill: 3  - Estimated Average Glucose  - T4 free    2. Hyperlipidemia, unspecified hyperlipidemia type  As above.  - ALT  - Lipid Profile  - atorvastatin (LIPITOR) 10 MG tablet; Take 1 tablet (10 mg) by mouth daily  Dispense: 90 tablet; Refill: 3    3. Essential hypertension  As above.  - Basic metabolic panel    4. Hypothyroidism, unspecified type  As above.  - TSH with free T4 reflex       BMI:   Estimated body mass index is 31.62 kg/m  as calculated from the following:    Height as of this encounter: 1.651 m (5' 5\").    Weight as of this encounter: 86.2 kg (190 lb).          Return in about 6 months (around 6/1/2021) for Diabetic Follow-up, Chronic Disease Management, Medication review.    Natalia Starr MD  [facility] - [facility]"

## 2020-11-30 NOTE — TELEPHONE ENCOUNTER
levothyroxine      Last Written Prescription Date:  8/31/2020  Last Fill Quantity: 90,   # refills: 0  Last Office Visit: 11/25/19  Future Office visit:    Next 5 appointments (look out 90 days)    Dec 01, 2020  9:30 AM  (Arrive by 9:15 AM)  SHORT with Natalia Starr MD  [facility] ([facility] ) [address]  [facility] [address]  [phone]

## 2020-12-01 ENCOUNTER — OFFICE VISIT (OUTPATIENT)
Dept: FAMILY MEDICINE | Facility: OTHER | Age: 70
End: 2020-12-01
Attending: FAMILY MEDICINE
Payer: MEDICARE

## 2020-12-01 VITALS
TEMPERATURE: 98.2 F | WEIGHT: 190 LBS | BODY MASS INDEX: 31.65 KG/M2 | OXYGEN SATURATION: 99 % | SYSTOLIC BLOOD PRESSURE: 144 MMHG | HEART RATE: 70 BPM | HEIGHT: 65 IN | DIASTOLIC BLOOD PRESSURE: 92 MMHG

## 2020-12-01 DIAGNOSIS — E11.9 TYPE 2 DIABETES MELLITUS WITHOUT COMPLICATION, WITHOUT LONG-TERM CURRENT USE OF INSULIN (H): Primary | ICD-10-CM

## 2020-12-01 DIAGNOSIS — I10 ESSENTIAL HYPERTENSION: ICD-10-CM

## 2020-12-01 DIAGNOSIS — E78.5 HYPERLIPIDEMIA, UNSPECIFIED HYPERLIPIDEMIA TYPE: ICD-10-CM

## 2020-12-01 DIAGNOSIS — E03.9 HYPOTHYROIDISM, UNSPECIFIED TYPE: ICD-10-CM

## 2020-12-01 LAB
ALT SERPL W P-5'-P-CCNC: 19 U/L (ref 0–50)
ANION GAP SERPL CALCULATED.3IONS-SCNC: 3 MMOL/L (ref 3–14)
BUN SERPL-MCNC: 15 MG/DL (ref 7–30)
CALCIUM SERPL-MCNC: 9.6 MG/DL (ref 8.5–10.1)
CHLORIDE SERPL-SCNC: 105 MMOL/L (ref 94–109)
CHOLEST SERPL-MCNC: 140 MG/DL
CO2 SERPL-SCNC: 30 MMOL/L (ref 20–32)
CREAT SERPL-MCNC: 0.79 MG/DL (ref 0.52–1.04)
CREAT UR-MCNC: 33 MG/DL
EST. AVERAGE GLUCOSE BLD GHB EST-MCNC: 120 MG/DL
GFR SERPL CREATININE-BSD FRML MDRD: 76 ML/MIN/{1.73_M2}
GLUCOSE SERPL-MCNC: 94 MG/DL (ref 70–99)
HBA1C MFR BLD: 5.8 % (ref 0–5.6)
HDLC SERPL-MCNC: 43 MG/DL
LDLC SERPL CALC-MCNC: 71 MG/DL
MICROALBUMIN UR-MCNC: <5 MG/L
MICROALBUMIN/CREAT UR: NORMAL MG/G CR (ref 0–25)
NONHDLC SERPL-MCNC: 97 MG/DL
POTASSIUM SERPL-SCNC: 4 MMOL/L (ref 3.4–5.3)
SODIUM SERPL-SCNC: 138 MMOL/L (ref 133–144)
T4 FREE SERPL-MCNC: 1.41 NG/DL (ref 0.76–1.46)
TRIGL SERPL-MCNC: 131 MG/DL
TSH SERPL DL<=0.005 MIU/L-ACNC: 0.31 MU/L (ref 0.4–4)

## 2020-12-01 PROCEDURE — 83036 HEMOGLOBIN GLYCOSYLATED A1C: CPT | Mod: ZL | Performed by: FAMILY MEDICINE

## 2020-12-01 PROCEDURE — 82043 UR ALBUMIN QUANTITATIVE: CPT | Mod: ZL | Performed by: FAMILY MEDICINE

## 2020-12-01 PROCEDURE — 84460 ALANINE AMINO (ALT) (SGPT): CPT | Mod: ZL | Performed by: FAMILY MEDICINE

## 2020-12-01 PROCEDURE — 99214 OFFICE O/P EST MOD 30 MIN: CPT | Performed by: FAMILY MEDICINE

## 2020-12-01 PROCEDURE — 999N001182 HC STATISTIC ESTIMATED AVERAGE GLUCOSE: Mod: ZL | Performed by: FAMILY MEDICINE

## 2020-12-01 PROCEDURE — 80061 LIPID PANEL: CPT | Mod: ZL | Performed by: FAMILY MEDICINE

## 2020-12-01 PROCEDURE — 80048 BASIC METABOLIC PNL TOTAL CA: CPT | Mod: ZL | Performed by: FAMILY MEDICINE

## 2020-12-01 PROCEDURE — 36415 COLL VENOUS BLD VENIPUNCTURE: CPT | Mod: ZL | Performed by: FAMILY MEDICINE

## 2020-12-01 PROCEDURE — 84443 ASSAY THYROID STIM HORMONE: CPT | Mod: ZL | Performed by: FAMILY MEDICINE

## 2020-12-01 PROCEDURE — 84439 ASSAY OF FREE THYROXINE: CPT | Mod: ZL | Performed by: FAMILY MEDICINE

## 2020-12-01 PROCEDURE — G0463 HOSPITAL OUTPT CLINIC VISIT: HCPCS

## 2020-12-01 RX ORDER — LANCETS
EACH MISCELLANEOUS
Qty: 102 EACH | Refills: 3 | Status: SHIPPED | OUTPATIENT
Start: 2020-12-01

## 2020-12-01 RX ORDER — BLOOD SUGAR DIAGNOSTIC
STRIP MISCELLANEOUS
Qty: 100 STRIP | Refills: 3 | Status: SHIPPED | OUTPATIENT
Start: 2020-12-01

## 2020-12-01 RX ORDER — ATORVASTATIN CALCIUM 10 MG/1
10 TABLET, FILM COATED ORAL DAILY
Qty: 90 TABLET | Refills: 3 | Status: SHIPPED | OUTPATIENT
Start: 2020-12-01 | End: 2021-12-27

## 2020-12-01 RX ORDER — METFORMIN HCL 500 MG
1000 TABLET, EXTENDED RELEASE 24 HR ORAL EVERY EVENING
Qty: 180 TABLET | Refills: 1 | COMMUNITY
Start: 2020-12-01 | End: 2021-02-05

## 2020-12-01 ASSESSMENT — ANXIETY QUESTIONNAIRES
IF YOU CHECKED OFF ANY PROBLEMS ON THIS QUESTIONNAIRE, HOW DIFFICULT HAVE THESE PROBLEMS MADE IT FOR YOU TO DO YOUR WORK, TAKE CARE OF THINGS AT HOME, OR GET ALONG WITH OTHER PEOPLE: NOT DIFFICULT AT ALL
3. WORRYING TOO MUCH ABOUT DIFFERENT THINGS: SEVERAL DAYS
GAD7 TOTAL SCORE: 3
5. BEING SO RESTLESS THAT IT IS HARD TO SIT STILL: NOT AT ALL
4. TROUBLE RELAXING: NOT AT ALL
1. FEELING NERVOUS, ANXIOUS, OR ON EDGE: SEVERAL DAYS
6. BECOMING EASILY ANNOYED OR IRRITABLE: NOT AT ALL
7. FEELING AFRAID AS IF SOMETHING AWFUL MIGHT HAPPEN: NOT AT ALL
2. NOT BEING ABLE TO STOP OR CONTROL WORRYING: SEVERAL DAYS

## 2020-12-01 ASSESSMENT — PATIENT HEALTH QUESTIONNAIRE - PHQ9: SUM OF ALL RESPONSES TO PHQ QUESTIONS 1-9: 1

## 2020-12-01 ASSESSMENT — PAIN SCALES - GENERAL: PAINLEVEL: NO PAIN (0)

## 2020-12-01 ASSESSMENT — MIFFLIN-ST. JEOR: SCORE: 1387.71

## 2020-12-01 NOTE — NURSING NOTE
"Chief Complaint   Patient presents with     Diabetes     Lipids     Hypertension       Initial BP (!) 144/92 (BP Location: Right arm, Patient Position: Chair, Cuff Size: Adult Regular)   Pulse 70   Temp 98.2  F (36.8  C) (Tympanic)   Ht 1.651 m (5' 5\")   Wt 86.2 kg (190 lb)   SpO2 99%   BMI 31.62 kg/m   Estimated body mass index is 31.62 kg/m  as calculated from the following:    Height as of this encounter: 1.651 m (5' 5\").    Weight as of this encounter: 86.2 kg (190 lb).  Medication Reconciliation: complete  Zayra Seymour LPN    "

## 2020-12-02 ASSESSMENT — ANXIETY QUESTIONNAIRES: GAD7 TOTAL SCORE: 3

## 2020-12-08 DIAGNOSIS — K59.00 CONSTIPATION, UNSPECIFIED CONSTIPATION TYPE: Primary | ICD-10-CM

## 2020-12-08 DIAGNOSIS — Z12.31 ENCOUNTER FOR SCREENING MAMMOGRAM FOR BREAST CANCER: ICD-10-CM

## 2020-12-08 RX ORDER — POLYETHYLENE GLYCOL 3350 17 G/17G
1 POWDER, FOR SOLUTION ORAL DAILY
Qty: 578 G | Refills: 2 | Status: SHIPPED | OUTPATIENT
Start: 2020-12-08 | End: 2021-04-16

## 2020-12-20 ENCOUNTER — HEALTH MAINTENANCE LETTER (OUTPATIENT)
Age: 70
End: 2020-12-20

## 2020-12-22 DIAGNOSIS — H91.93 DECREASED HEARING OF BOTH EARS: Primary | ICD-10-CM

## 2021-01-11 ENCOUNTER — ANCILLARY PROCEDURE (OUTPATIENT)
Dept: MAMMOGRAPHY | Facility: OTHER | Age: 71
End: 2021-01-11
Attending: FAMILY MEDICINE
Payer: MEDICARE

## 2021-01-11 DIAGNOSIS — Z12.31 ENCOUNTER FOR SCREENING MAMMOGRAM FOR BREAST CANCER: ICD-10-CM

## 2021-01-11 PROCEDURE — 77063 BREAST TOMOSYNTHESIS BI: CPT | Mod: TC

## 2021-01-18 ENCOUNTER — OFFICE VISIT (OUTPATIENT)
Dept: AUDIOLOGY | Facility: OTHER | Age: 71
End: 2021-01-18
Attending: AUDIOLOGIST
Payer: MEDICARE

## 2021-01-18 DIAGNOSIS — H91.93 DECREASED HEARING OF BOTH EARS: ICD-10-CM

## 2021-01-18 DIAGNOSIS — H90.5 SENSORY HEARING LOSS, UNILATERAL: Primary | ICD-10-CM

## 2021-01-18 PROCEDURE — 92567 TYMPANOMETRY: CPT | Performed by: AUDIOLOGIST

## 2021-01-18 PROCEDURE — 92557 COMPREHENSIVE HEARING TEST: CPT | Performed by: AUDIOLOGIST

## 2021-01-18 NOTE — PROGRESS NOTES
Audiology Evaluation Completed. Please refer SCANNED AUDIOGRAM and/or TYMPANOGRAM for BACKGROUND, RESULTS, RECOMMENDATIONS.      Kylie CHAUDHARY, [facility]A  Audiologist #[id number]

## 2021-02-05 DIAGNOSIS — E11.9 TYPE 2 DIABETES MELLITUS WITHOUT COMPLICATION, WITHOUT LONG-TERM CURRENT USE OF INSULIN (H): ICD-10-CM

## 2021-02-05 RX ORDER — METFORMIN HCL 500 MG
TABLET, EXTENDED RELEASE 24 HR ORAL
Qty: 270 TABLET | Refills: 1 | Status: SHIPPED | OUTPATIENT
Start: 2021-02-05 | End: 2021-05-17

## 2021-02-11 ENCOUNTER — MYC MEDICAL ADVICE (OUTPATIENT)
Dept: FAMILY MEDICINE | Facility: OTHER | Age: 71
End: 2021-02-11

## 2021-03-03 DIAGNOSIS — E03.9 HYPOTHYROIDISM, UNSPECIFIED TYPE: ICD-10-CM

## 2021-03-03 RX ORDER — LEVOTHYROXINE SODIUM 100 UG/1
TABLET ORAL
Qty: 90 TABLET | Refills: 0 | Status: SHIPPED | OUTPATIENT
Start: 2021-03-03 | End: 2021-05-17

## 2021-04-16 DIAGNOSIS — K59.00 CONSTIPATION, UNSPECIFIED CONSTIPATION TYPE: ICD-10-CM

## 2021-04-16 RX ORDER — POLYETHYLENE GLYCOL 3350 17 G/17G
POWDER, FOR SOLUTION ORAL
Qty: 510 G | Refills: 1 | Status: SHIPPED | OUTPATIENT
Start: 2021-04-16 | End: 2023-01-16

## 2021-04-16 NOTE — TELEPHONE ENCOUNTER
Miralax 17 GM      Last Written Prescription Date:  12/8/20  Last Fill Quantity: 578 g,   # refills: 2  Last Office Visit: 12/1/20  Future Office visit:    Next 5 appointments (look out 90 days)    Jun 29, 2021  9:45 AM  (Arrive by 9:30 AM)  SHORT with Natalia Starr MD  [facility] ([facility] ) [address]  [facility] [address]  [phone]   Jul 06, 2021  8:00 AM  (Arrive by 7:45 AM)  Return Visit with JELANI Amezquita MD  [facility] ([facility] ) [address] [place] [id number] [phone]           Routing refill request to provider for review/approval because:

## 2021-04-18 ENCOUNTER — HEALTH MAINTENANCE LETTER (OUTPATIENT)
Age: 71
End: 2021-04-18

## 2021-05-16 DIAGNOSIS — E11.9 TYPE 2 DIABETES MELLITUS WITHOUT COMPLICATION, WITHOUT LONG-TERM CURRENT USE OF INSULIN (H): ICD-10-CM

## 2021-05-16 DIAGNOSIS — E03.9 HYPOTHYROIDISM, UNSPECIFIED TYPE: ICD-10-CM

## 2021-05-17 RX ORDER — METFORMIN HCL 500 MG
TABLET, EXTENDED RELEASE 24 HR ORAL
Qty: 270 TABLET | Refills: 1 | Status: SHIPPED | OUTPATIENT
Start: 2021-05-17 | End: 2021-06-29

## 2021-05-17 RX ORDER — LEVOTHYROXINE SODIUM 100 UG/1
TABLET ORAL
Qty: 90 TABLET | Refills: 0 | Status: SHIPPED | OUTPATIENT
Start: 2021-05-17 | End: 2021-08-30

## 2021-05-17 NOTE — TELEPHONE ENCOUNTER
metformin      Last Written Prescription Date:  2/5/21  Last Fill Quantity: 270,   # refills: 1  Last Office Visit: 12/1/2020  Future Office visit:    Next 5 appointments (look out 90 days)    Jun 29, 2021  9:45 AM  (Arrive by 9:30 AM)  Office Visit with Natalia Starr MD  [facility] ([facility] ) [address] [facility] [address]  [phone]   Jul 06, 2021  8:00 AM  (Arrive by 7:45 AM)  Return Visit with JELANI Amezquita MD  [facility] [place] ([facility] [place] ) [address] [place] [phone]

## 2021-05-17 NOTE — TELEPHONE ENCOUNTER
levothyroxine      Last Written Prescription Date:  3/3/21  Last Fill Quantity: 90,   # refills: 0  Last Office Visit: 12/1/2020  Future Office visit:    Next 5 appointments (look out 90 days)    Jun 29, 2021  9:45 AM  (Arrive by 9:30 AM)  Office Visit with Natalia Starr MD  [facility] ([facility] ) [address]  [facility] [address]  [phone]   Jul 06, 2021  8:00 AM  (Arrive by 7:45 AM)  Return Visit with JELANI Amezquita MD  [facility] [place] ([facility] ) [address] [place] [phone]

## 2021-06-02 NOTE — PROGRESS NOTES
"    Assessment & Plan     1. Type 2 diabetes mellitus without complication, without long-term current use of insulin (H)  Labs updated, medications refilled.  Follow-up in six months, sooner as needed.  - Hemoglobin A1c  - metFORMIN (GLUCOPHAGE-XR) 500 MG 24 hr tablet; Take 2 tablets (1,000 mg) by mouth At Bedtime  Dispense: 180 tablet; Refill: 0  - ZZ[facility] FOOT EXAM  NO CHARGE  - T4 free  - Estimated Average Glucose    2. Hyperlipidemia, unspecified hyperlipidemia type  Labs updated  - ALT  - Lipid Profile    3. Essential hypertension  As above  - Basic metabolic panel    4. Hypothyroidism, unspecified type  As above  - TSH with free T4 reflex         BMI:   Estimated body mass index is 31.15 kg/m  as calculated from the following:    Height as of this encounter: 1.651 m (5' 5\").    Weight as of this encounter: 84.9 kg (187 lb 3.2 oz).     Return in about 6 months (around 12/29/2021) for Diabetic Follow-up, Chronic Disease Management, Medication review.    Natalia Starr MD  [facility] - [place] DESHAUN Garza is a 71 year old who presents for the following health issues     HPI     Diabetes Follow-up    How often are you checking your blood sugar? A few times a week  What time of day are you checking your blood sugars (select all that apply)?  Before and after meals  Have you had any blood sugars above 200?  No  Have you had any blood sugars below 70?  No    What symptoms do you notice when your blood sugar is low?  None    What concerns do you have today about your diabetes? None     Do you have any of these symptoms? (Select all that apply)  No numbness or tingling in feet.  No redness, sores or blisters on feet.  No complaints of excessive thirst.  No reports of blurry vision.  No significant changes to weight.      BP Readings from Last 2 Encounters:   06/29/21 136/72   12/01/20 (!) 144/92     Hemoglobin A1C (%)   Date Value   06/29/2021 5.8 (H)   12/01/2020 5.8 (H)     LDL Cholesterol " "Calculated (mg/dL)   Date Value   06/29/2021 71   12/01/2020 71       Hyperlipidemia Follow-Up      Are you regularly taking any medication or supplement to lower your cholesterol?   Yes- Lipitor    Are you having muscle aches or other side effects that you think could be caused by your cholesterol lowering medication?  No    Hypertension Follow-up      Do you check your blood pressure regularly outside of the clinic? No     Are you following a low salt diet? Yes    Are your blood pressures ever more than 140 on the top number (systolic) OR more   than 90 on the bottom number (diastolic), for example 140/90? No    Hypothyroidism Follow-up      Since last visit, patient describes the following symptoms: Weight stable, no hair loss, no skin changes, no constipation, no loose stools      How many servings of fruits and vegetables do you eat daily?  2-3    On average, how many sweetened beverages do you drink each day (Examples: soda, juice, sweet tea, etc.  Do NOT count diet or artificially sweetened beverages)?   0    How many days per week do you exercise enough to make your heart beat faster? 3 or less    How many minutes a day do you exercise enough to make your heart beat faster? 30 - 60    How many days per week do you miss taking your medication? 0      Review of Systems   Constitutional, HEENT, cardiovascular, pulmonary, gi and gu systems are negative, except as otherwise noted.      Objective    /72 (BP Location: Left arm, Patient Position: Sitting, Cuff Size: Adult Regular)   Pulse 73   Temp 97.8  F (36.6  C) (Tympanic)   Resp 16   Ht 1.651 m (5' 5\")   Wt 84.9 kg (187 lb 3.2 oz)   SpO2 98%   BMI 31.15 kg/m    Body mass index is 31.15 kg/m .  Physical Exam   GENERAL: healthy, alert and no distress  PSYCH: mentation appears normal, affect normal/bright  Diabetic foot exam: normal DP and PT pulses, no trophic changes or ulcerative lesions and normal monofilament exam            "

## 2021-06-13 ENCOUNTER — HEALTH MAINTENANCE LETTER (OUTPATIENT)
Age: 71
End: 2021-06-13

## 2021-06-29 ENCOUNTER — OFFICE VISIT (OUTPATIENT)
Dept: FAMILY MEDICINE | Facility: OTHER | Age: 71
End: 2021-06-29
Attending: FAMILY MEDICINE
Payer: MEDICARE

## 2021-06-29 VITALS
HEIGHT: 65 IN | TEMPERATURE: 97.8 F | OXYGEN SATURATION: 98 % | WEIGHT: 187.2 LBS | BODY MASS INDEX: 31.19 KG/M2 | DIASTOLIC BLOOD PRESSURE: 72 MMHG | RESPIRATION RATE: 16 BRPM | SYSTOLIC BLOOD PRESSURE: 136 MMHG | HEART RATE: 73 BPM

## 2021-06-29 DIAGNOSIS — E78.5 HYPERLIPIDEMIA, UNSPECIFIED HYPERLIPIDEMIA TYPE: ICD-10-CM

## 2021-06-29 DIAGNOSIS — E11.9 TYPE 2 DIABETES MELLITUS WITHOUT COMPLICATION, WITHOUT LONG-TERM CURRENT USE OF INSULIN (H): Primary | ICD-10-CM

## 2021-06-29 DIAGNOSIS — I10 ESSENTIAL HYPERTENSION: ICD-10-CM

## 2021-06-29 DIAGNOSIS — E03.9 HYPOTHYROIDISM, UNSPECIFIED TYPE: ICD-10-CM

## 2021-06-29 LAB
ALT SERPL W P-5'-P-CCNC: 23 U/L (ref 0–50)
ANION GAP SERPL CALCULATED.3IONS-SCNC: 5 MMOL/L (ref 3–14)
BUN SERPL-MCNC: 16 MG/DL (ref 7–30)
CALCIUM SERPL-MCNC: 9.6 MG/DL (ref 8.5–10.1)
CHLORIDE SERPL-SCNC: 104 MMOL/L (ref 94–109)
CHOLEST SERPL-MCNC: 148 MG/DL
CO2 SERPL-SCNC: 28 MMOL/L (ref 20–32)
CREAT SERPL-MCNC: 0.8 MG/DL (ref 0.52–1.04)
EST. AVERAGE GLUCOSE BLD GHB EST-MCNC: 120 MG/DL
GFR SERPL CREATININE-BSD FRML MDRD: 74 ML/MIN/{1.73_M2}
GLUCOSE SERPL-MCNC: 108 MG/DL (ref 70–99)
HBA1C MFR BLD: 5.8 % (ref 0–5.6)
HDLC SERPL-MCNC: 39 MG/DL
LDLC SERPL CALC-MCNC: 71 MG/DL
NONHDLC SERPL-MCNC: 109 MG/DL
POTASSIUM SERPL-SCNC: 4.4 MMOL/L (ref 3.4–5.3)
SODIUM SERPL-SCNC: 137 MMOL/L (ref 133–144)
T4 FREE SERPL-MCNC: 1.35 NG/DL (ref 0.76–1.46)
TRIGL SERPL-MCNC: 189 MG/DL
TSH SERPL DL<=0.005 MIU/L-ACNC: 0.18 MU/L (ref 0.4–4)

## 2021-06-29 PROCEDURE — 80048 BASIC METABOLIC PNL TOTAL CA: CPT | Mod: ZL | Performed by: FAMILY MEDICINE

## 2021-06-29 PROCEDURE — 999N001182 HC STATISTIC ESTIMATED AVERAGE GLUCOSE: Mod: ZL | Performed by: FAMILY MEDICINE

## 2021-06-29 PROCEDURE — 36415 COLL VENOUS BLD VENIPUNCTURE: CPT | Mod: ZL | Performed by: FAMILY MEDICINE

## 2021-06-29 PROCEDURE — 99214 OFFICE O/P EST MOD 30 MIN: CPT | Performed by: FAMILY MEDICINE

## 2021-06-29 PROCEDURE — 84460 ALANINE AMINO (ALT) (SGPT): CPT | Mod: ZL | Performed by: FAMILY MEDICINE

## 2021-06-29 PROCEDURE — 84443 ASSAY THYROID STIM HORMONE: CPT | Mod: ZL | Performed by: FAMILY MEDICINE

## 2021-06-29 PROCEDURE — G0463 HOSPITAL OUTPT CLINIC VISIT: HCPCS

## 2021-06-29 PROCEDURE — 84439 ASSAY OF FREE THYROXINE: CPT | Mod: ZL | Performed by: FAMILY MEDICINE

## 2021-06-29 PROCEDURE — 83036 HEMOGLOBIN GLYCOSYLATED A1C: CPT | Mod: ZL | Performed by: FAMILY MEDICINE

## 2021-06-29 PROCEDURE — 80061 LIPID PANEL: CPT | Mod: ZL | Performed by: FAMILY MEDICINE

## 2021-06-29 RX ORDER — METFORMIN HCL 500 MG
1000 TABLET, EXTENDED RELEASE 24 HR ORAL AT BEDTIME
Qty: 180 TABLET | Refills: 0 | Status: SHIPPED | OUTPATIENT
Start: 2021-06-29 | End: 2022-02-21

## 2021-06-29 ASSESSMENT — MIFFLIN-ST. JEOR: SCORE: 1370.01

## 2021-06-29 ASSESSMENT — ANXIETY QUESTIONNAIRES
IF YOU CHECKED OFF ANY PROBLEMS ON THIS QUESTIONNAIRE, HOW DIFFICULT HAVE THESE PROBLEMS MADE IT FOR YOU TO DO YOUR WORK, TAKE CARE OF THINGS AT HOME, OR GET ALONG WITH OTHER PEOPLE: NOT DIFFICULT AT ALL
4. TROUBLE RELAXING: NOT AT ALL
6. BECOMING EASILY ANNOYED OR IRRITABLE: NOT AT ALL
GAD7 TOTAL SCORE: 2
5. BEING SO RESTLESS THAT IT IS HARD TO SIT STILL: NOT AT ALL
2. NOT BEING ABLE TO STOP OR CONTROL WORRYING: NOT AT ALL
3. WORRYING TOO MUCH ABOUT DIFFERENT THINGS: SEVERAL DAYS
7. FEELING AFRAID AS IF SOMETHING AWFUL MIGHT HAPPEN: NOT AT ALL
1. FEELING NERVOUS, ANXIOUS, OR ON EDGE: SEVERAL DAYS

## 2021-06-29 ASSESSMENT — PAIN SCALES - GENERAL: PAINLEVEL: NO PAIN (0)

## 2021-06-29 ASSESSMENT — PATIENT HEALTH QUESTIONNAIRE - PHQ9: SUM OF ALL RESPONSES TO PHQ QUESTIONS 1-9: 0

## 2021-06-29 NOTE — NURSING NOTE
"Chief Complaint   Patient presents with     Diabetes     Lipids     Thyroid Problem     Hypertension       Initial /72 (BP Location: Left arm, Patient Position: Sitting, Cuff Size: Adult Regular)   Pulse 73   Temp 97.8  F (36.6  C) (Tympanic)   Resp 16   Ht 1.651 m (5' 5\")   Wt 84.9 kg (187 lb 3.2 oz)   SpO2 98%   BMI 31.15 kg/m   Estimated body mass index is 31.15 kg/m  as calculated from the following:    Height as of this encounter: 1.651 m (5' 5\").    Weight as of this encounter: 84.9 kg (187 lb 3.2 oz).  Medication Reconciliation: complete  Chloe Dinero MA  "

## 2021-06-30 ASSESSMENT — ANXIETY QUESTIONNAIRES: GAD7 TOTAL SCORE: 2

## 2021-07-06 ENCOUNTER — OFFICE VISIT (OUTPATIENT)
Dept: DERMATOLOGY | Facility: OTHER | Age: 71
End: 2021-07-06
Attending: DERMATOLOGY
Payer: MEDICARE

## 2021-07-06 VITALS
SYSTOLIC BLOOD PRESSURE: 144 MMHG | OXYGEN SATURATION: 98 % | TEMPERATURE: 97.5 F | HEART RATE: 76 BPM | WEIGHT: 187 LBS | DIASTOLIC BLOOD PRESSURE: 82 MMHG | BODY MASS INDEX: 31.16 KG/M2 | HEIGHT: 65 IN | RESPIRATION RATE: 16 BRPM

## 2021-07-06 DIAGNOSIS — D22.9 MULTIPLE BENIGN NEVI: ICD-10-CM

## 2021-07-06 DIAGNOSIS — Z85.828 HISTORY OF NONMELANOMA SKIN CANCER: Primary | ICD-10-CM

## 2021-07-06 DIAGNOSIS — L82.1 SEBORRHEIC KERATOSES: ICD-10-CM

## 2021-07-06 PROCEDURE — G0463 HOSPITAL OUTPT CLINIC VISIT: HCPCS

## 2021-07-06 PROCEDURE — 99212 OFFICE O/P EST SF 10 MIN: CPT | Performed by: DERMATOLOGY

## 2021-07-06 ASSESSMENT — MIFFLIN-ST. JEOR: SCORE: 1369.11

## 2021-07-06 ASSESSMENT — PAIN SCALES - GENERAL: PAINLEVEL: NO PAIN (0)

## 2021-07-06 NOTE — PROGRESS NOTES
Visit Date: 2021    SUBJECTIVE:  This is a return visit for Alondra, whom we saw a few years ago.  She has had a basal cell on her upper lip and would like a general upper body check today.    OBJECTIVE:  Exam shows a healthy lady in no distress.  present on her upper lip is a scar from the surgery, but no evidence of recurrence of basal cell tumor.  We checked her face, scalp, upper chest, abdomen, lower legs, her legs and hands.  We found multiple lentigines and two papular nevi, no actinic keratoses.  No evidence of any skin cancer on her face or high-risk regions of the neck.    ASSESSMENT:  No worrisome lesions, resolved skin cancer of the basal cell type.    PLAN:  Reassured.  Recheck again in 2 years.    JELANI Amezquita MD        D: 2021   T: 2021   MT: lazara    Name:     SONI LOCKWOOD  MRN:      [id number]        Account:    [id number]   :      1950           Visit Date: 2021     Document: [id number]

## 2021-07-06 NOTE — LETTER
7/6/2021       RE: Kayla Lugo  [address] [place] [address] [address]  [facility] [address]     Dear Colleague,    Thank you for referring your patient, Kayla Lugo, to the [facility]. Please see a copy of my visit note below.    Dictated today      Again, thank you for allowing me to participate in the care of your patient.      Sincerely,    JELANI Amezquita MD

## 2021-07-06 NOTE — NURSING NOTE
"Chief Complaint   Patient presents with     Basal Cell Carcinoma     7 years ago ,, annual skin check       Initial BP (!) 144/82 (BP Location: Right arm)   Pulse 76   Temp 97.5  F (36.4  C) (Tympanic)   Resp 16   Ht 1.651 m (5' 5\")   Wt 84.8 kg (187 lb)   SpO2 98%   BMI 31.12 kg/m   Estimated body mass index is 31.12 kg/m  as calculated from the following:    Height as of this encounter: 1.651 m (5' 5\").    Weight as of this encounter: 84.8 kg (187 lb).  Medication Reconciliation: complete  Anamaria Grover LPN  "

## 2021-08-26 DIAGNOSIS — E03.9 HYPOTHYROIDISM, UNSPECIFIED TYPE: ICD-10-CM

## 2021-08-30 RX ORDER — LEVOTHYROXINE SODIUM 100 UG/1
TABLET ORAL
Qty: 90 TABLET | Refills: 1 | Status: SHIPPED | OUTPATIENT
Start: 2021-08-30 | End: 2022-02-21

## 2021-08-30 NOTE — TELEPHONE ENCOUNTER
levothyroxine      Last Written Prescription Date:  5/17/21  Last Fill Quantity: 90,   # refills: 0  Last Office Visit: 6/29/21  Future Office visit:

## 2021-09-30 ENCOUNTER — LAB (OUTPATIENT)
Dept: LAB | Facility: OTHER | Age: 71
End: 2021-09-30
Attending: FAMILY MEDICINE
Payer: MEDICARE

## 2021-09-30 ENCOUNTER — VIRTUAL VISIT (OUTPATIENT)
Dept: FAMILY MEDICINE | Facility: OTHER | Age: 71
End: 2021-09-30
Attending: FAMILY MEDICINE
Payer: MEDICARE

## 2021-09-30 DIAGNOSIS — R35.0 URINARY FREQUENCY: ICD-10-CM

## 2021-09-30 DIAGNOSIS — R35.0 URINARY FREQUENCY: Primary | ICD-10-CM

## 2021-09-30 LAB
ALBUMIN UR-MCNC: NEGATIVE MG/DL
APPEARANCE UR: CLEAR
BACTERIA #/AREA URNS HPF: ABNORMAL /HPF
BILIRUB UR QL STRIP: NEGATIVE
COLOR UR AUTO: YELLOW
GLUCOSE UR STRIP-MCNC: NEGATIVE MG/DL
HGB UR QL STRIP: NEGATIVE
KETONES UR STRIP-MCNC: NEGATIVE MG/DL
LEUKOCYTE ESTERASE UR QL STRIP: ABNORMAL
NITRATE UR QL: NEGATIVE
PH UR STRIP: 6 [PH] (ref 5–7)
RBC #/AREA URNS AUTO: ABNORMAL /HPF
SP GR UR STRIP: 1.01 (ref 1–1.03)
SQUAMOUS #/AREA URNS AUTO: ABNORMAL /LPF
UROBILINOGEN UR STRIP-ACNC: 1 E.U./DL
WBC #/AREA URNS AUTO: ABNORMAL /HPF

## 2021-09-30 PROCEDURE — 81003 URINALYSIS AUTO W/O SCOPE: CPT | Mod: ZL

## 2021-09-30 PROCEDURE — 99441 PR PHYSICIAN TELEPHONE EVALUATION 5-10 MIN: CPT | Mod: 95 | Performed by: FAMILY MEDICINE

## 2021-09-30 PROCEDURE — 81001 URINALYSIS AUTO W/SCOPE: CPT | Mod: ZL

## 2021-09-30 NOTE — PROGRESS NOTES
"Kayla is a 71 year old who is being evaluated via a billable telephone visit.      What phone number would you like to be contacted at? See demo  How would you like to obtain your AVS? MyChart    Assessment & Plan     1. Urinary frequency  Increase fluids, avoid bladder irritants.  Follow-up if no improvement noted.  - *UA reflex to Microscopic and Culture - [facility]; Future        BMI:   Estimated body mass index is 31.12 kg/m  as calculated from the following:    Height as of 7/6/21: 1.651 m (5' 5\").    Weight as of 7/6/21: 84.8 kg (187 lb).     Return if symptoms worsen or fail to improve.    Natalia Starr MD  [facility] - [facility]      Subjective   Kayla is a 71 year old who presents for the following health issues     HPI     Genitourinary - Female  Onset/Duration: 10th of September  Description:   Painful urination (Dysuria): YES           Frequency: YES  Blood in urine (Hematuria): no  Delay in urine (Hesitency): no  Intensity: mild  Progression of Symptoms:  worsening  Accompanying Signs & Symptoms:  Fever/chills: no  Flank pain: no  Nausea and vomiting: no  Vaginal symptoms: odor  Abdominal/Pelvic Pain: no  History:   History of frequent UTI s: no  History of kidney stones: no  Sexually Active: no  Possibility of pregnancy: No  Precipitating or alleviating factors: None  Therapies tried and outcome: Increase fluid intake and  cream       Review of Systems   Constitutional, HEENT, cardiovascular, pulmonary, gi and gu systems are negative, except as otherwise noted.      Objective           Vitals:  No vitals were obtained today due to virtual visit.    Physical Exam   PSYCH: Alert and oriented times 3; coherent speech, normal   rate and volume, able to articulate logical thoughts, able   to abstract reason, no tangential thoughts, no hallucinations   or delusions  Her affect is normal and pleasant  RESP: No cough, no audible wheezing, able to talk in full sentences  Remainder of " exam unable to be completed due to telephone visits    Results for orders placed or performed in visit on 09/30/21   *UA reflex to Microscopic and Culture - [facility]     Status: Abnormal    Specimen: Urine, Clean Catch   Result Value Ref Range    Color Urine Yellow Colorless, Straw, Light Yellow, Yellow    Appearance Urine Clear Clear    Glucose Urine Negative Negative mg/dL    Bilirubin Urine Negative Negative    Ketones Urine Negative Negative mg/dL    Specific Gravity Urine 1.010 1.003 - 1.035    Blood Urine Negative Negative    pH Urine 6.0 5.0 - 7.0    Protein Albumin Urine Negative Negative mg/dL    Urobilinogen Urine 1.0 0.2, 1.0 E.U./dL    Nitrite Urine Negative Negative    Leukocyte Esterase Urine Small (A) Negative   Urine Microscopic     Status: Abnormal   Result Value Ref Range    Bacteria Urine Many (A) None Seen /HPF    RBC Urine 0-2 0-2 /HPF /HPF    WBC Urine 0-5 0-5 /HPF /HPF    Squamous Epithelials Urine Few (A) None Seen /LPF    Narrative    Urine Culture not indicated             Phone call duration: 5 minutes

## 2021-10-03 ENCOUNTER — HEALTH MAINTENANCE LETTER (OUTPATIENT)
Age: 71
End: 2021-10-03

## 2021-10-29 ENCOUNTER — NURSE TRIAGE (OUTPATIENT)
Dept: FAMILY MEDICINE | Facility: OTHER | Age: 71
End: 2021-10-29

## 2021-10-29 ENCOUNTER — OFFICE VISIT (OUTPATIENT)
Dept: FAMILY MEDICINE | Facility: OTHER | Age: 71
End: 2021-10-29
Attending: FAMILY MEDICINE
Payer: MEDICARE

## 2021-10-29 VITALS
BODY MASS INDEX: 32.15 KG/M2 | HEIGHT: 65 IN | HEART RATE: 103 BPM | TEMPERATURE: 98.6 F | WEIGHT: 193 LBS | DIASTOLIC BLOOD PRESSURE: 60 MMHG | OXYGEN SATURATION: 96 % | SYSTOLIC BLOOD PRESSURE: 178 MMHG

## 2021-10-29 DIAGNOSIS — H66.91 ACUTE RIGHT OTITIS MEDIA: Primary | ICD-10-CM

## 2021-10-29 PROCEDURE — G0463 HOSPITAL OUTPT CLINIC VISIT: HCPCS

## 2021-10-29 PROCEDURE — 99213 OFFICE O/P EST LOW 20 MIN: CPT | Performed by: FAMILY MEDICINE

## 2021-10-29 RX ORDER — AMOXICILLIN 875 MG
875 TABLET ORAL 2 TIMES DAILY
Qty: 20 TABLET | Refills: 0 | Status: SHIPPED | OUTPATIENT
Start: 2021-10-29 | End: 2021-11-08

## 2021-10-29 ASSESSMENT — PAIN SCALES - GENERAL: PAINLEVEL: NO PAIN (0)

## 2021-10-29 ASSESSMENT — MIFFLIN-ST. JEOR: SCORE: 1396.32

## 2021-10-29 NOTE — PROGRESS NOTES
"  Assessment & Plan     1. Acute right otitis media  Amoxicillin sent, symptomatic cares reviewed.  Follow-up if no improvement noted.  - amoxicillin (AMOXIL) 875 MG tablet; Take 1 tablet (875 mg) by mouth 2 times daily for 10 days  Dispense: 20 tablet; Refill: 0    Patient has been using quite a bit of OTC cough medication, which is likely contributing to elevated BP today.  Patient will avoid OTC cough and cold medication, and she will check BP at home.  She will notify the clinic if readings aren't improving.       BMI:   Estimated body mass index is 32.12 kg/m  as calculated from the following:    Height as of this encounter: 1.651 m (5' 5\").    Weight as of this encounter: 87.5 kg (193 lb).     Return in about 2 months (around 12/29/2021) for Diabetic Follow-up, Chronic Disease Management, Medication review.    Natalia Starr MD  [facility] - [place] Garza is a 71 year old who presents for the following health issues     HPI     Acute Illness  Acute illness concerns: Sinus pain/pressure/ ear pain   Onset/Duration: 10/27/21  Symptoms:  Fever: no  Chills/Sweats: no  Headache (location?): YES  Sinus Pressure: YES  Conjunctivitis:  no  Ear Pain: YES: right  Rhinorrhea: no  Congestion: no  Sore Throat: no  Cough: no  Wheeze: no  Decreased Appetite: no  Nausea: no  Vomiting: no  Diarrhea: no  Dysuria/Freq.: no  Dysuria or Hematuria: no  Fatigue/Achiness: no  Sick/Strep Exposure: no  Therapies tried and outcome: advil, zyrtec, fluticasone      Review of Systems   Constitutional, HEENT, cardiovascular, pulmonary, gi and gu systems are negative, except as otherwise noted.      Objective    BP (!) 178/60   Pulse 103   Temp 98.6  F (37  C) (Tympanic)   Ht 1.651 m (5' 5\")   Wt 87.5 kg (193 lb)   SpO2 96%   BMI 32.12 kg/m    Body mass index is 32.12 kg/m .  Physical Exam   GENERAL: healthy, alert and no distress  EYES: Eyes grossly normal to inspection, PERRL and conjunctivae and " sclerae normal  HENT: normal cephalic/atraumatic, right ear: erythematous and bulging membrane, left ear: clear effusion, nose and mouth without ulcers or lesions, oropharynx clear and oral mucous membranes moist  NECK: no adenopathy  RESP: lungs clear to auscultation - no rales, rhonchi or wheezes  CV: regular rates and rhythm, normal S1 S2, no S3 or S4 and no murmur, click or rub  PSYCH: mentation appears normal, affect normal/bright

## 2021-10-29 NOTE — TELEPHONE ENCOUNTER
"Pt calling and thinks she has a sinus infection that started three days ago along with right earache. Sinus pain. She states she had a cold a couple weeks ago but got over it.    Scheduled for today with PCP.      Juanita Lima RN      Reason for Disposition    Earache    Additional Information    Negative: Severe difficulty breathing (e.g., struggling for each breath, speaks in single words)    Negative: Sounds like a life-threatening emergency to the triager    Negative: [1] Sinus infection AND [2] taking an antibiotic AND [3] symptoms continue    Negative: [1] Difficulty breathing AND [2] not from stuffy nose (e.g., not relieved by cleaning out the nose)    Negative: [1] SEVERE headache AND [2] fever    Negative: [1] Redness or swelling on the cheek, forehead or around the eye AND [2] fever    Negative: Fever > 104 F (40 C)    Negative: Patient sounds very sick or weak to the triager    Negative: [1] SEVERE pain AND [2] not improved 2 hours after pain medicine    Negative: [1] Redness or swelling on the cheek, forehead or around the eye AND [2] no fever    Negative: [1] Fever > 101 F (38.3 C) AND [2] age > 60    Negative: [1] Fever > 100.0 F (37.8 C) AND [2] bedridden (e.g., nursing home patient, CVA, chronic illness, recovering from surgery)    Negative: [1] Fever > 100.0 F (37.8 C) AND [2] diabetes mellitus or weak immune system (e.g., HIV positive, cancer chemo, splenectomy, organ transplant, chronic steroids)    Answer Assessment - Initial Assessment Questions  1. LOCATION: \"Where does it hurt?\"       Across nose ,in ears and across forehead  2. ONSET: \"When did the sinus pain start?\"  (e.g., hours, days)       Three days ago  3. SEVERITY: \"How bad is the pain?\"   (Scale 1-10; mild, moderate or severe)    - MILD (1-3): doesn't interfere with normal activities     - MODERATE (4-7): interferes with normal activities (e.g., work or school) or awakens from sleep    - SEVERE (8-10): excruciating pain and patient " "unable to do any normal activities         6/10  4. RECURRENT SYMPTOM: \"Have you ever had sinus problems before?\" If so, ask: \"When was the last time?\" and \"What happened that time?\"       About a year ago  5. NASAL CONGESTION: \"Is the nose blocked?\" If so, ask, \"Can you open it or must you breathe through the mouth?\"    A cold about two weeks ago  6. NASAL DISCHARGE: \"Do you have discharge from your nose?\" If so ask, \"What color?\"     no  7. FEVER: \"Do you have a fever?\" If so, ask: \"What is it, how was it measured, and when did it start?\"       no  8. OTHER SYMPTOMS: \"Do you have any other symptoms?\" (e.g., sore throat, cough, earache, difficulty breathing)      Yes right ear  9. PREGNANCY: \"Is there any chance you are pregnant?\" \"When was your last menstrual period?\"      no    Protocols used: SINUS PAIN OR CONGESTION-A-AH      "

## 2021-11-10 ENCOUNTER — TRANSFERRED RECORDS (OUTPATIENT)
Dept: HEALTH INFORMATION MANAGEMENT | Facility: HOSPITAL | Age: 71
End: 2021-11-10
Payer: MEDICARE

## 2021-11-10 LAB — RETINOPATHY: NEGATIVE

## 2021-12-24 DIAGNOSIS — E78.5 HYPERLIPIDEMIA, UNSPECIFIED HYPERLIPIDEMIA TYPE: ICD-10-CM

## 2021-12-25 NOTE — TELEPHONE ENCOUNTER
Lipitor      Last Written Prescription Date:  12/01/2020  Last Fill Quantity: 90,   # refills: 3  Last Office Visit: 10/29/2021  Future Office visit:    Next 5 appointments (look out 90 days)    Jan 04, 2022  9:45 AM  (Arrive by 9:30 AM)  SHORT with Natalia Starr MD  [facility] ([facility] ) [address]  [facility] [address]  [phone]

## 2021-12-27 RX ORDER — ATORVASTATIN CALCIUM 10 MG/1
TABLET, FILM COATED ORAL
Qty: 90 TABLET | Refills: 0 | Status: SHIPPED | OUTPATIENT
Start: 2021-12-27 | End: 2022-03-24

## 2022-01-03 NOTE — PROGRESS NOTES
"  Assessment & Plan     1. Type 2 diabetes mellitus without complication, without long-term current use of insulin (H)  Labs updated, will adjust medication dosing if needed.  Follow-up in six months, sooner as needed.  - Albumin Random Urine Quantitative with Creat Ratio; Future  - Hemoglobin A1c; Future  - Hemoglobin A1c    2. Hyperlipidemia, unspecified hyperlipidemia type  As above.  - ALT; Future  - Lipid Profile; Future  - ALT  - Lipid Profile    3. Essential hypertension  As above   - Basic metabolic panel; Future  - Basic metabolic panel    4. Hypothyroidism, unspecified type  Labs updated  - TSH with free T4 reflex; Future  - TSH with free T4 reflex    5. Vaginal atrophy  Symptoms sound like vaginal atrophy.  Will try vaginal estrogen cream.  Follow-up if no improvement noted.  - estradiol (ESTRACE) 0.1 MG/GM vaginal cream; Place 1 g vaginally twice a week  Dispense: 42.5 g; Refill: 3       BMI:   Estimated body mass index is 31.28 kg/m  as calculated from the following:    Height as of this encounter: 1.651 m (5' 5\").    Weight as of this encounter: 85.3 kg (188 lb).     Return in about 6 months (around 7/10/2022) for Diabetic Follow-up, Chronic Disease Management, Medication review.    Natalia Starr MD  [facility] - [place] DESHAUN Garza is a 71 year old who presents for the following health issues     HPI     Diabetes Follow-up    How often are you checking your blood sugar? One time daily  What time of day are you checking your blood sugars (select all that apply)?  Before meals  Have you had any blood sugars above 200?  No  Have you had any blood sugars below 70?  No    What symptoms do you notice when your blood sugar is low?  Not applicable    What concerns do you have today about your diabetes? None     Do you have any of these symptoms? (Select all that apply)  No numbness or tingling in feet.  No redness, sores or blisters on feet.  No complaints of excessive thirst.  " "No reports of blurry vision.  No significant changes to weight.      BP Readings from Last 2 Encounters:   01/10/22 138/78   10/29/21 (!) 178/60     Hemoglobin A1C POCT (%)   Date Value   06/29/2021 5.8 (H)   12/01/2020 5.8 (H)     LDL Cholesterol Calculated (mg/dL)   Date Value   06/29/2021 71   12/01/2020 71       Hyperlipidemia Follow-Up      Are you regularly taking any medication or supplement to lower your cholesterol?   Yes- atorvastatin    Are you having muscle aches or other side effects that you think could be caused by your cholesterol lowering medication?  No    Hypertension Follow-up      Do you check your blood pressure regularly outside of the clinic? No     Are you following a low salt diet? Yes    Are your blood pressures ever more than 140 on the top number (systolic) OR more   than 90 on the bottom number (diastolic), for example 140/90? No    Depression and Anxiety Follow-Up    How are you doing with your depression since your last visit? No change    How are you doing with your anxiety since your last visit?  Worsened  \"irritation\"     Are you having other symptoms that might be associated with depression or anxiety? Yes:  irritable     Have you had a significant life event? No     Do you have any concerns with your use of alcohol or other drugs? No    Social History     Tobacco Use     Smoking status: Never Smoker     Smokeless tobacco: Never Used   Substance Use Topics     Alcohol use: No     Alcohol/week: 0.0 standard drinks     Drug use: No     PHQ 12/1/2020 6/29/2021 1/10/2022   PHQ-9 Total Score 1 0 0   Q9: Thoughts of better off dead/self-harm past 2 weeks Not at all Not at all Not at all     JOCELYNN-7 SCORE 12/1/2020 6/29/2021 1/10/2022   Total Score 3 2 5     Last PHQ-9 1/10/2022   1.  Little interest or pleasure in doing things 0   2.  Feeling down, depressed, or hopeless 0   3.  Trouble falling or staying asleep, or sleeping too much 0   4.  Feeling tired or having little energy 0   5.  " "Poor appetite or overeating 0   6.  Feeling bad about yourself 0   7.  Trouble concentrating 0   8.  Moving slowly or restless 0   Q9: Thoughts of better off dead/self-harm past 2 weeks 0   PHQ-9 Total Score 0   Difficulty at work, home, or with people -     JOCELYNN-7  1/10/2022   1. Feeling nervous, anxious, or on edge 2   2. Not being able to stop or control worrying 1   3. Worrying too much about different things 1   4. Trouble relaxing 0   5. Being so restless that it is hard to sit still 0   6. Becoming easily annoyed or irritable 1   7. Feeling afraid, as if something awful might happen 0   JOCELYNN-7 Total Score 5   If you checked any problems, how difficult have they made it for you to do your work, take care of things at home, or get along with other people? Somewhat difficult       Suicide Assessment Five-step Evaluation and Treatment (SAFE-T)    Hypothyroidism Follow-up      Since last visit, patient describes the following symptoms: Weight stable, no hair loss, no skin changes, no constipation, no loose stools      How many servings of fruits and vegetables do you eat daily?  0-1    On average, how many sweetened beverages do you drink each day (Examples: soda, juice, sweet tea, etc.  Do NOT count diet or artificially sweetened beverages)?   0    How many days per week do you exercise enough to make your heart beat faster? 3 or less    How many minutes a day do you exercise enough to make your heart beat faster? 9 or less    How many days per week do you miss taking your medication? 0    Patient notes ongoing labial and vaginal irritation and odor.    Review of Systems   Constitutional, HEENT, cardiovascular, pulmonary, gi and gu systems are negative, except as otherwise noted.      Objective    /78 (BP Location: Right arm, Patient Position: Chair, Cuff Size: Adult Large)   Pulse 84   Temp 98.1  F (36.7  C) (Tympanic)   Ht 1.651 m (5' 5\")   Wt 85.3 kg (188 lb)   SpO2 98%   BMI 31.28 kg/m    Body mass " index is 31.28 kg/m .  Physical Exam   GENERAL: healthy, alert and no distress  RESP: lungs clear to auscultation - no rales, rhonchi or wheezes  CV: regular rates and rhythm, normal S1 S2, no S3 or S4 and no murmur, click or rub  PSYCH: mentation appears normal, affect normal/bright

## 2022-01-10 ENCOUNTER — OFFICE VISIT (OUTPATIENT)
Dept: FAMILY MEDICINE | Facility: OTHER | Age: 72
End: 2022-01-10
Attending: FAMILY MEDICINE
Payer: MEDICARE

## 2022-01-10 VITALS
WEIGHT: 188 LBS | HEART RATE: 84 BPM | OXYGEN SATURATION: 98 % | HEIGHT: 65 IN | TEMPERATURE: 98.1 F | BODY MASS INDEX: 31.32 KG/M2 | SYSTOLIC BLOOD PRESSURE: 138 MMHG | DIASTOLIC BLOOD PRESSURE: 78 MMHG

## 2022-01-10 DIAGNOSIS — E11.9 TYPE 2 DIABETES MELLITUS WITHOUT COMPLICATION, WITHOUT LONG-TERM CURRENT USE OF INSULIN (H): Primary | ICD-10-CM

## 2022-01-10 DIAGNOSIS — E78.5 HYPERLIPIDEMIA, UNSPECIFIED HYPERLIPIDEMIA TYPE: ICD-10-CM

## 2022-01-10 DIAGNOSIS — E03.9 HYPOTHYROIDISM, UNSPECIFIED TYPE: ICD-10-CM

## 2022-01-10 DIAGNOSIS — I10 ESSENTIAL HYPERTENSION: ICD-10-CM

## 2022-01-10 DIAGNOSIS — N95.2 VAGINAL ATROPHY: ICD-10-CM

## 2022-01-10 LAB
ALT SERPL W P-5'-P-CCNC: 31 U/L (ref 0–50)
ANION GAP SERPL CALCULATED.3IONS-SCNC: 6 MMOL/L (ref 3–14)
BUN SERPL-MCNC: 15 MG/DL (ref 7–30)
CALCIUM SERPL-MCNC: 10.1 MG/DL (ref 8.5–10.1)
CHLORIDE BLD-SCNC: 102 MMOL/L (ref 94–109)
CHOLEST SERPL-MCNC: 158 MG/DL
CO2 SERPL-SCNC: 27 MMOL/L (ref 20–32)
CREAT SERPL-MCNC: 0.84 MG/DL (ref 0.52–1.04)
CREAT UR-MCNC: 92 MG/DL
EST. AVERAGE GLUCOSE BLD GHB EST-MCNC: 128 MG/DL
FASTING STATUS PATIENT QL REPORTED: YES
GFR SERPL CREATININE-BSD FRML MDRD: 74 ML/MIN/1.73M2
GLUCOSE BLD-MCNC: 106 MG/DL (ref 70–99)
HBA1C MFR BLD: 6.1 % (ref 0–5.6)
HDLC SERPL-MCNC: 36 MG/DL
LDLC SERPL CALC-MCNC: 78 MG/DL
MICROALBUMIN UR-MCNC: 10 MG/L
MICROALBUMIN/CREAT UR: 10.87 MG/G CR (ref 0–25)
NONHDLC SERPL-MCNC: 122 MG/DL
POTASSIUM BLD-SCNC: 3.8 MMOL/L (ref 3.4–5.3)
SODIUM SERPL-SCNC: 135 MMOL/L (ref 133–144)
TRIGL SERPL-MCNC: 221 MG/DL
TSH SERPL DL<=0.005 MIU/L-ACNC: 0.44 MU/L (ref 0.4–4)

## 2022-01-10 PROCEDURE — 80061 LIPID PANEL: CPT | Mod: ZL | Performed by: FAMILY MEDICINE

## 2022-01-10 PROCEDURE — 80048 BASIC METABOLIC PNL TOTAL CA: CPT | Mod: ZL | Performed by: FAMILY MEDICINE

## 2022-01-10 PROCEDURE — 99214 OFFICE O/P EST MOD 30 MIN: CPT | Performed by: FAMILY MEDICINE

## 2022-01-10 PROCEDURE — 36415 COLL VENOUS BLD VENIPUNCTURE: CPT | Mod: ZL | Performed by: FAMILY MEDICINE

## 2022-01-10 PROCEDURE — G0463 HOSPITAL OUTPT CLINIC VISIT: HCPCS

## 2022-01-10 PROCEDURE — 84460 ALANINE AMINO (ALT) (SGPT): CPT | Mod: ZL | Performed by: FAMILY MEDICINE

## 2022-01-10 PROCEDURE — 83036 HEMOGLOBIN GLYCOSYLATED A1C: CPT | Mod: ZL | Performed by: FAMILY MEDICINE

## 2022-01-10 PROCEDURE — 82043 UR ALBUMIN QUANTITATIVE: CPT | Mod: ZL | Performed by: FAMILY MEDICINE

## 2022-01-10 PROCEDURE — 84443 ASSAY THYROID STIM HORMONE: CPT | Mod: ZL | Performed by: FAMILY MEDICINE

## 2022-01-10 RX ORDER — ESTRADIOL 0.1 MG/G
1 CREAM VAGINAL
Qty: 42.5 G | Refills: 3 | Status: SHIPPED | OUTPATIENT
Start: 2022-01-10 | End: 2022-09-01

## 2022-01-10 ASSESSMENT — ANXIETY QUESTIONNAIRES
5. BEING SO RESTLESS THAT IT IS HARD TO SIT STILL: NOT AT ALL
4. TROUBLE RELAXING: NOT AT ALL
GAD7 TOTAL SCORE: 5
1. FEELING NERVOUS, ANXIOUS, OR ON EDGE: MORE THAN HALF THE DAYS
6. BECOMING EASILY ANNOYED OR IRRITABLE: SEVERAL DAYS
IF YOU CHECKED OFF ANY PROBLEMS ON THIS QUESTIONNAIRE, HOW DIFFICULT HAVE THESE PROBLEMS MADE IT FOR YOU TO DO YOUR WORK, TAKE CARE OF THINGS AT HOME, OR GET ALONG WITH OTHER PEOPLE: SOMEWHAT DIFFICULT
2. NOT BEING ABLE TO STOP OR CONTROL WORRYING: SEVERAL DAYS
7. FEELING AFRAID AS IF SOMETHING AWFUL MIGHT HAPPEN: NOT AT ALL
3. WORRYING TOO MUCH ABOUT DIFFERENT THINGS: SEVERAL DAYS

## 2022-01-10 ASSESSMENT — PATIENT HEALTH QUESTIONNAIRE - PHQ9: SUM OF ALL RESPONSES TO PHQ QUESTIONS 1-9: 0

## 2022-01-10 ASSESSMENT — MIFFLIN-ST. JEOR: SCORE: 1373.64

## 2022-01-10 ASSESSMENT — PAIN SCALES - GENERAL: PAINLEVEL: NO PAIN (0)

## 2022-01-10 NOTE — NURSING NOTE
"Chief Complaint   Patient presents with     Lipids     Hypertension     Depression     Anxiety     Thyroid Disease       Initial /78 (BP Location: Right arm, Patient Position: Chair, Cuff Size: Adult Large)   Pulse 84   Temp 98.1  F (36.7  C) (Tympanic)   Ht 1.651 m (5' 5\")   Wt 85.3 kg (188 lb)   SpO2 98%   BMI 31.28 kg/m   Estimated body mass index is 31.28 kg/m  as calculated from the following:    Height as of this encounter: 1.651 m (5' 5\").    Weight as of this encounter: 85.3 kg (188 lb).  Medication Reconciliation: complete  Zayra Seymour LPN    "

## 2022-01-11 ASSESSMENT — ANXIETY QUESTIONNAIRES: GAD7 TOTAL SCORE: 5

## 2022-01-13 DIAGNOSIS — H91.93 DECREASED HEARING OF BOTH EARS: Primary | ICD-10-CM

## 2022-01-20 ENCOUNTER — OFFICE VISIT (OUTPATIENT)
Dept: AUDIOLOGY | Facility: OTHER | Age: 72
End: 2022-01-20
Attending: AUDIOLOGIST
Payer: MEDICARE

## 2022-01-20 DIAGNOSIS — H91.93 DECREASED HEARING OF BOTH EARS: ICD-10-CM

## 2022-01-20 DIAGNOSIS — H90.3 SENSORINEURAL HEARING LOSS, ASYMMETRICAL: Primary | ICD-10-CM

## 2022-01-20 PROCEDURE — 92557 COMPREHENSIVE HEARING TEST: CPT | Performed by: AUDIOLOGIST

## 2022-01-20 PROCEDURE — 92550 TYMPANOMETRY & REFLEX THRESH: CPT | Performed by: AUDIOLOGIST

## 2022-01-20 NOTE — PROGRESS NOTES
Audiology Evaluation Completed. Please refer SCANNED AUDIOGRAM and/or TYMPANOGRAM for BACKGROUND, RESULTS, RECOMMENDATIONS.      Kylie CHAUDHARY, [facility]  Audiologist #[id number]

## 2022-02-19 DIAGNOSIS — E03.9 HYPOTHYROIDISM, UNSPECIFIED TYPE: ICD-10-CM

## 2022-02-19 DIAGNOSIS — E11.9 TYPE 2 DIABETES MELLITUS WITHOUT COMPLICATION, WITHOUT LONG-TERM CURRENT USE OF INSULIN (H): ICD-10-CM

## 2022-02-20 NOTE — TELEPHONE ENCOUNTER
METFORMIN      Last Written Prescription Date:  6-  Last Fill Quantity: 180,   # refills: 0  Last Office Visit: 1-  Future Office visit:       Routing refill request to provider for review/approval because:    SYNTHROID  Last Written Prescription Date:  8-  Last Fill Quantity: 90,   # refills: 1  Last Office Visit: 1-  Future Office visit:       Routing refill request to provider for review/approval because:

## 2022-02-21 RX ORDER — METFORMIN HCL 500 MG
TABLET, EXTENDED RELEASE 24 HR ORAL
Qty: 180 TABLET | Refills: 1 | Status: SHIPPED | OUTPATIENT
Start: 2022-02-21 | End: 2022-08-23

## 2022-02-21 RX ORDER — LEVOTHYROXINE SODIUM 100 UG/1
TABLET ORAL
Qty: 90 TABLET | Refills: 1 | Status: SHIPPED | OUTPATIENT
Start: 2022-02-21 | End: 2022-08-23

## 2022-03-24 DIAGNOSIS — E78.5 HYPERLIPIDEMIA, UNSPECIFIED HYPERLIPIDEMIA TYPE: ICD-10-CM

## 2022-03-24 RX ORDER — ATORVASTATIN CALCIUM 10 MG/1
TABLET, FILM COATED ORAL
Qty: 90 TABLET | Refills: 1 | OUTPATIENT
Start: 2022-03-24

## 2022-05-14 ENCOUNTER — HEALTH MAINTENANCE LETTER (OUTPATIENT)
Age: 72
End: 2022-05-14

## 2022-05-24 ENCOUNTER — NURSE TRIAGE (OUTPATIENT)
Dept: FAMILY MEDICINE | Facility: OTHER | Age: 72
End: 2022-05-24
Payer: MEDICARE

## 2022-05-24 NOTE — TELEPHONE ENCOUNTER
Protocol advises patient to be seen within 2 weeks for lump on her right arm that patient noticed about two weeks ago. Patient is scheduled with PCP.   Next 5 appointments (look out 90 days)    May 26, 2022  8:45 AM  (Arrive by 8:30 AM)  SHORT with Natalia Starr MD  [facility] [place] ([facility] ) [address]  [place] [address]  [phone]   Jun 14, 2022 12:00 PM  (Arrive by 11:45 AM)  Return Visit with JELANI Amezquita MD  [facility] [place] ([facility] [place] ) [address]  [place] [address]  [phone]   Jul 11, 2022  9:45 AM  (Arrive by 9:30 AM)  SHORT with Natalia Starr MD  [facility] ([facility] ) [address]  [place] [address]  [phone]            Reason for Disposition    Small growth, spot, bump, or pigmented area of skin (e.g., moles, skin tags, wart, melanoma, skin cancer)    [1] Skin growth or mole AND [2] bleeds or itches or is painful    Additional Information    Negative: Sounds like a life-threatening emergency to the triager    Negative: [1] Looks infected AND [2] large red area (> 2 in. or 5 cm)    Negative: [1] Fever AND [2] bump is tender to touch    Negative: [1] Fever AND [2] bright red area or streak    Negative: [1] Looks infected (spreading redness, pus) AND [2] no fever    Negative: Looks like a boil, infected sore, or deep ulcer    Negative: Caller can't describe it clearly    Negative: [1] Skin growth or mole AND [2] two sides do not look the same (i.e., asymmetric)    Negative: [1] Skin growth or mole AND [2] border is irregular or blurry    Negative: [1] Skin growth or mole AND [2] changes color, or it has more than one color    Negative: [1] Skin growth or mole AND[2] larger than a pencil eraser, or increasing in size    Negative: [1] Skin growth or mole AND [2] sticks up out of the skin (elevated) AND [3]  "feels rough to the touch    Answer Assessment - Initial Assessment Questions  1. APPEARANCE of SWELLING: \"What does it look like?\" (e.g., lymph node, insect bite, mole)      Raised and red  2. SIZE: \"How large is the swelling?\" (inches, cm or compare to coins)      Lump is about the size of a dime  3. LOCATION: \"Where is the swelling located?\"      Below the elbow on the right arm  4. ONSET: \"When did the swelling start?\"      Two weeks ago  5. PAIN: \"Is it painful?\" If so, ask: \"How much?\"      no  6. ITCH: \"Does it itch?\" If so, ask: \"How much?\"      Yes. Not bad  7. CAUSE: \"What do you think caused the swelling?\"      unsure  8. OTHER SYMPTOMS: \"Do you have any other symptoms?\" (e.g., fever)      no    Protocols used: SKIN LUMP OR LOCALIZED SWELLING-A-AH, SKIN LESION - MOLES OR GROWTHS-A-AH      "

## 2022-05-26 ENCOUNTER — OFFICE VISIT (OUTPATIENT)
Dept: FAMILY MEDICINE | Facility: OTHER | Age: 72
End: 2022-05-26
Attending: FAMILY MEDICINE
Payer: MEDICARE

## 2022-05-26 VITALS
HEART RATE: 78 BPM | WEIGHT: 190 LBS | TEMPERATURE: 97.2 F | SYSTOLIC BLOOD PRESSURE: 130 MMHG | BODY MASS INDEX: 31.62 KG/M2 | OXYGEN SATURATION: 98 % | DIASTOLIC BLOOD PRESSURE: 82 MMHG | RESPIRATION RATE: 18 BRPM

## 2022-05-26 DIAGNOSIS — D49.2 ABNORMAL SKIN GROWTH: Primary | ICD-10-CM

## 2022-05-26 PROCEDURE — 99214 OFFICE O/P EST MOD 30 MIN: CPT | Performed by: FAMILY MEDICINE

## 2022-05-26 PROCEDURE — G0463 HOSPITAL OUTPT CLINIC VISIT: HCPCS

## 2022-05-26 RX ORDER — CEPHALEXIN 500 MG/1
500 CAPSULE ORAL 3 TIMES DAILY
Qty: 21 CAPSULE | Refills: 0 | Status: SHIPPED | OUTPATIENT
Start: 2022-05-26 | End: 2022-06-02

## 2022-05-26 ASSESSMENT — PAIN SCALES - GENERAL: PAINLEVEL: NO PAIN (0)

## 2022-05-26 NOTE — NURSING NOTE
"Chief Complaint   Patient presents with     Mass       Initial /82 (BP Location: Left arm, Patient Position: Sitting, Cuff Size: Adult Regular)   Pulse 78   Temp 97.2  F (36.2  C) (Tympanic)   Resp 18   Wt 86.2 kg (190 lb)   SpO2 98%   BMI 31.62 kg/m   Estimated body mass index is 31.62 kg/m  as calculated from the following:    Height as of 1/10/22: 1.651 m (5' 5\").    Weight as of this encounter: 86.2 kg (190 lb).  Medication Reconciliation: complete  Lara Dove LPN  "

## 2022-05-26 NOTE — PROGRESS NOTES
"  Assessment & Plan     1. Abnormal skin growth  I would worry about a nodular BCC, and even through she is scheduled to see Dermatology in about 3 weeks, I would possibly like this removed sooner.  General Surgery referral ordered.  I don't think this is a cyst (quite firm, nontender), but we will cover with a short course of Keflex in case.  Follow-up as directed.  - Adult General Surg Referral  - cephALEXin (KEFLEX) 500 MG capsule; Take 1 capsule (500 mg) by mouth 3 times daily for 7 days  Dispense: 21 capsule; Refill: 0      BMI:   Estimated body mass index is 31.62 kg/m  as calculated from the following:    Height as of 1/10/22: 1.651 m (5' 5\").    Weight as of this encounter: 86.2 kg (190 lb).     Return if symptoms worsen or fail to improve.    Natalia Starr MD  [facility] - [place]      Subjective   Kayla is a 72 year old who presents for the following health issues     HPI     Concern - lump on arm  Onset: 3 weeks  Description: large red lump on right arm  Intensity: 0/10  Progression of Symptoms:  Increasing in size  Accompanying Signs & Symptoms: none  Previous history of similar problem: hx of skin cancer  Precipitating factors:        Worsened by: nothing  Alleviating factors:        Improved by: nothing  Therapies tried and outcome:  none , has appt with Dr. Amezquita on 06/14  Patient does have a history of BCC removed from her lip area.  She notes this growth started and grew very suddenly.      Review of Systems   Constitutional, HEENT, cardiovascular, pulmonary, gi and gu systems are negative, except as otherwise noted.      Objective    /82 (BP Location: Left arm, Patient Position: Sitting, Cuff Size: Adult Regular)   Pulse 78   Temp 97.2  F (36.2  C) (Tympanic)   Resp 18   Wt 86.2 kg (190 lb)   SpO2 98%   BMI 31.62 kg/m    Body mass index is 31.62 kg/m .  Physical Exam   GENERAL: healthy, alert and no distress  SKIN: approximately 1 cm erythematous nodular lesion along " right forearm, firm to palpation, no fluctuance noted  PSYCH: mentation appears normal, affect normal/bright

## 2022-06-01 ENCOUNTER — OFFICE VISIT (OUTPATIENT)
Dept: SURGERY | Facility: OTHER | Age: 72
End: 2022-06-01
Attending: FAMILY MEDICINE
Payer: MEDICARE

## 2022-06-01 VITALS
SYSTOLIC BLOOD PRESSURE: 130 MMHG | WEIGHT: 185 LBS | DIASTOLIC BLOOD PRESSURE: 80 MMHG | OXYGEN SATURATION: 98 % | HEIGHT: 65 IN | TEMPERATURE: 97 F | BODY MASS INDEX: 30.82 KG/M2 | HEART RATE: 78 BPM

## 2022-06-01 DIAGNOSIS — D49.2 ABNORMAL SKIN GROWTH: ICD-10-CM

## 2022-06-01 PROCEDURE — 11604 EXC TR-EXT MAL+MARG 3.1-4 CM: CPT | Performed by: SURGERY

## 2022-06-01 PROCEDURE — G0463 HOSPITAL OUTPT CLINIC VISIT: HCPCS | Mod: 25

## 2022-06-01 PROCEDURE — G0463 HOSPITAL OUTPT CLINIC VISIT: HCPCS

## 2022-06-01 PROCEDURE — 88305 TISSUE EXAM BY PATHOLOGIST: CPT | Mod: TC | Performed by: SURGERY

## 2022-06-01 ASSESSMENT — PAIN SCALES - GENERAL: PAINLEVEL: NO PAIN (0)

## 2022-06-01 NOTE — PATIENT INSTRUCTIONS
"Thank you for allowing Dr. Mireles and the surgical team to participate in your care today. Please call with any scheduling questions to our Unit Health Coordinator at [phone] or any other questions to the nurse (Ila) at [phone].     POST PROCEDURE INSTRUCTIONS    Apply ice to the surgical area to reduce swelling if desired. (no longer than 20 minutes at a time)  Remove your dressing in 24-48 hours.  Wash incision gently with soap and water twice daily or let soapy shower water run over the wound. Do not scrub the wound.  Keep incision clean and dry.   Do not submerge wound in water such as a tub bath or swimming until healed.   Do not do dishes or \"dirty work\" if wound is on hands.   Do not put make-up, deodorant, powders, hairspray, lotions, etc on the incision.  Cover with a clean dressing daily or when wet/soiled  If you have steri-strips, these will fall off on their own in 7 days. If they are still adhered after 7 days, you may remove them by pulling gently.   You can use acetaminophen(Tylenol) and/or Ibuprofen for pain.     If you have any bleeding, cover the wound with clean gauze and hold pressure for 10-15 Minutes. If the bleeding does not stop or is heavy and profuse, call the clinic or go to the Urgent Care/Emergency Department.    SIGNS OF INFECTION:    Watch for redness, swelling, red streaks, pus, drainage, warmth, fever, increased pain, foul smell.   Contact our office or your primary health care provider if you notice any of the warning signs.     FOLLOW - UP    Follow-up in clinic with  in 10-14 days for suture removal.   Pathology results will available in about 7-10 days and be communicated via phone or letter or may be discussed at a follow-up appointment.  Call the office with any questions.    "

## 2022-06-01 NOTE — NURSING NOTE
"Chief Complaint   Patient presents with     Consult     Abnormal skin growth, right arm       Initial /80   Pulse 78   Temp 97  F (36.1  C)   Ht 1.651 m (5' 5\")   Wt 83.9 kg (185 lb)   SpO2 98%   BMI 30.79 kg/m   Estimated body mass index is 30.79 kg/m  as calculated from the following:    Height as of this encounter: 1.651 m (5' 5\").    Weight as of this encounter: 83.9 kg (185 lb).  Medication Reconciliation: complete  Michelle Estrada LPN  "

## 2022-06-02 NOTE — PROGRESS NOTES
[facility] Surgery  Minor Procedure Note    Preoperative diagnosis:  Skin growth right arm     Postoperative diagnosis:  Same     Procedure:  Excisional biopsy right arm > 3 cm     Anesthesia:  Local    History: 72 year old year old female with history of right arm lesion that has been growing rapidly since bumping, it does not bleed she does have history of basal cell. Differential includes pyogenic granuloma and basal cell, could also be fast growing melanoma here by PCP for outpatient excision.    Findings:  4 x 1 cm ellipse of skin taken, oriented, narrow excision.     Tissue to pathology:    See above     Details:   The requisite time out pause observed during which the patient confirmed their identity, date of birth, side and site of the requested excision.  The region was prepped and draped sterilely; local anesthesia was obtained with infiltration of 10 cc of 2% lidocaine, with epi.  An 4 x 1.25 incision was made to narrowly include lesion and carried through full thickness skin to healthy appearing fat.  It was oriented with short stitch superior and long stitch lateral.  The wound was irrigated and closed in layers with interrupted 3-0 Vicryl in the subcutaneous tissue.  The skin was reapproximated with 3-0 nylon interrupted.  Sterile dressings were applied.   The procedure was well tolerated and the patient was discharged with wound care instructions and followup appointment.       Shai Mireles MD

## 2022-06-06 LAB
PATH REPORT.COMMENTS IMP SPEC: ABNORMAL
PATH REPORT.COMMENTS IMP SPEC: ABNORMAL
PATH REPORT.COMMENTS IMP SPEC: YES
PATH REPORT.FINAL DX SPEC: ABNORMAL
PATH REPORT.GROSS SPEC: ABNORMAL
PATH REPORT.MICROSCOPIC SPEC OTHER STN: ABNORMAL
PATH REPORT.RELEVANT HX SPEC: ABNORMAL
PHOTO IMAGE: ABNORMAL

## 2022-06-06 PROCEDURE — 88305 TISSUE EXAM BY PATHOLOGIST: CPT | Mod: 26 | Performed by: PATHOLOGY

## 2022-06-15 ENCOUNTER — OFFICE VISIT (OUTPATIENT)
Dept: SURGERY | Facility: OTHER | Age: 72
End: 2022-06-15
Attending: SURGERY
Payer: MEDICARE

## 2022-06-15 VITALS
TEMPERATURE: 98.3 F | WEIGHT: 185 LBS | HEART RATE: 77 BPM | OXYGEN SATURATION: 97 % | SYSTOLIC BLOOD PRESSURE: 130 MMHG | HEIGHT: 65 IN | RESPIRATION RATE: 15 BRPM | DIASTOLIC BLOOD PRESSURE: 72 MMHG | BODY MASS INDEX: 30.82 KG/M2

## 2022-06-15 DIAGNOSIS — Z51.89 VISIT FOR WOUND CHECK: Primary | ICD-10-CM

## 2022-06-15 PROCEDURE — 99024 POSTOP FOLLOW-UP VISIT: CPT | Performed by: SURGERY

## 2022-06-15 PROCEDURE — G0463 HOSPITAL OUTPT CLINIC VISIT: HCPCS

## 2022-06-15 ASSESSMENT — PAIN SCALES - GENERAL: PAINLEVEL: NO PAIN (0)

## 2022-06-15 NOTE — PATIENT INSTRUCTIONS
Thank you for allowing Dr. Mireles and our surgical team to participate in your care. Please call our health unit coordinator at [phone] with scheduling questions or the nurse at [phone] with any other questions or concerns.

## 2022-06-15 NOTE — NURSING NOTE
"Chief Complaint   Patient presents with     Results     Right forearm lesion removal      Suture Removal     Right forearm        Initial /72 (BP Location: Left arm, Cuff Size: Adult Large)   Pulse 77   Temp 98.3  F (36.8  C) (Tympanic)   Resp 15   Ht 1.651 m (5' 5\")   Wt 83.9 kg (185 lb)   SpO2 97%   BMI 30.79 kg/m   Estimated body mass index is 30.79 kg/m  as calculated from the following:    Height as of this encounter: 1.651 m (5' 5\").    Weight as of this encounter: 83.9 kg (185 lb).  Medication Reconciliation: complete  Anamaria Grover LPN  "

## 2022-06-15 NOTE — PROGRESS NOTES
"[facility] Progress Note    HPI: RTC for follow up right arm excision.       S: She is doing well, denies pain in right arm, does describe odor to her urine, denies burning, does see a dermatologist yearly.     O:   Vitals:  /72 (BP Location: Left arm, Cuff Size: Adult Large)   Pulse 77   Temp 98.3  F (36.8  C) (Tympanic)   Resp 15   Ht 1.651 m (5' 5\")   Wt 83.9 kg (185 lb)   SpO2 97%   BMI 30.79 kg/m        Physical Exam:  G: alert oriented, no acute distress   ENT: sclera non-icteric   Pulm: no respiratory distress   CVS: RRR  ABD: soft non-tender non-distended   Ext: Well healed surgical site on upper right forearm.     Assessment/Plan:  72 y.o. male with squamous cell carcinoma, negative margins. Discussed that she should continue to see her dermatologist yearly and to notify them of the change in medical history to look for any sign of recurrence. Stitches removed, she will be seeing her PCP soon to discuss urinary issues.     Shai Mireles MD     "

## 2022-07-08 NOTE — PROGRESS NOTES
"  Assessment & Plan     1. Type 2 diabetes mellitus without complication, without long-term current use of insulin (H)  Labs updated, will adjust medication if needed.  Follow-up in six months, sooner as needed.  - Hemoglobin A1c; Future  - Hemoglobin A1c    2. Mixed hyperlipidemia  As above  - ALT; Future  - Lipid Profile; Future  - Lipid Profile  - ALT    3. Essential hypertension  As above  - Basic metabolic panel; Future  - Basic metabolic panel    4. Adjustment disorder with depressed mood  No changes    5. Hypothyroidism, unspecified type  Labs updated  - TSH with free T4 reflex; Future  - TSH with free T4 reflex    6. Malodorous urine  Patient notes continued strong odored urine - will check sample and possibly refer to Urology  - *UA reflex to Microscopic and Culture - [facility]; Future  - *UA reflex to Microscopic and Culture - [facility]  - Urine Microscopic          BMI:   Estimated body mass index is 31.2 kg/m  as calculated from the following:    Height as of 6/15/22: 1.651 m (5' 5\").    Weight as of this encounter: 85 kg (187 lb 8 oz).     Return in about 6 months (around 1/11/2023) for Diabetic Follow-up, Chronic Disease Management, Medication review.    Natalia Starr MD  [facility] [place] DESHAUN Garza is a 72 year old presenting for the following health issues:  Chronic Disease Management      HPI     Diabetes Follow-up    How often are you checking your blood sugar? A few times a week  What time of day are you checking your blood sugars (select all that apply)?  fasting am  Have you had any blood sugars above 200?  No  Have you had any blood sugars below 70?  No    What symptoms do you notice when your blood sugar is low?  None    What concerns do you have today about your diabetes? None     Do you have any of these symptoms? (Select all that apply)  No numbness or tingling in feet.  No redness, sores or blisters on feet.  No complaints of excessive " thirst.  No reports of blurry vision.  No significant changes to weight.      BP Readings from Last 2 Encounters:   07/11/22 137/78   06/15/22 130/72     Hemoglobin A1C POCT (%)   Date Value   06/29/2021 5.8 (H)   12/01/2020 5.8 (H)     Hemoglobin A1C (%)   Date Value   01/10/2022 6.1 (H)     LDL Cholesterol Calculated (mg/dL)   Date Value   01/10/2022 78   06/29/2021 71   12/01/2020 71       Hyperlipidemia Follow-Up      Are you regularly taking any medication or supplement to lower your cholesterol?   Yes- atorvastatin 10 mg daily    Are you having muscle aches or other side effects that you think could be caused by your cholesterol lowering medication?  No    Hypertension Follow-up      Do you check your blood pressure regularly outside of the clinic? Yes    Are you following a low salt diet? Yes    Are your blood pressures ever more than 140 on the top number (systolic) OR more   than 90 on the bottom number (diastolic), for example 140/90? Yes    Hypothyroidism Follow-up      Since last visit, patient describes the following symptoms: Weight stable, no hair loss, no skin changes, no constipation, no loose stools      How many servings of fruits and vegetables do you eat daily?  0-1    On average, how many sweetened beverages do you drink each day (Examples: soda, juice, sweet tea, etc.  Do NOT count diet or artificially sweetened beverages)?   0    How many days per week do you exercise enough to make your heart beat faster? 3 or less    How many minutes a day do you exercise enough to make your heart beat faster? 9 or less    How many days per week do you miss taking your medication? 0      Review of Systems   Constitutional, HEENT, cardiovascular, pulmonary, gi and gu systems are negative, except as otherwise noted.      Objective    /78 (BP Location: Left arm, Patient Position: Sitting, Cuff Size: Adult Regular)   Pulse 69   Temp 97.2  F (36.2  C) (Tympanic)   Resp 20   Wt 85 kg (187 lb 8 oz)    SpO2 97%   BMI 31.20 kg/m    Body mass index is 31.2 kg/m .  Physical Exam   GENERAL: healthy, alert and no distress  PSYCH: mentation appears normal, affect normal/bright  Diabetic foot exam: normal DP and PT pulses, no trophic changes or ulcerative lesions and normal monofilament exam                .  ..

## 2022-07-11 ENCOUNTER — OFFICE VISIT (OUTPATIENT)
Dept: FAMILY MEDICINE | Facility: OTHER | Age: 72
End: 2022-07-11
Attending: FAMILY MEDICINE
Payer: MEDICARE

## 2022-07-11 VITALS
SYSTOLIC BLOOD PRESSURE: 137 MMHG | OXYGEN SATURATION: 97 % | WEIGHT: 187.5 LBS | TEMPERATURE: 97.2 F | HEART RATE: 69 BPM | RESPIRATION RATE: 20 BRPM | DIASTOLIC BLOOD PRESSURE: 78 MMHG | BODY MASS INDEX: 31.2 KG/M2

## 2022-07-11 DIAGNOSIS — I10 ESSENTIAL HYPERTENSION: ICD-10-CM

## 2022-07-11 DIAGNOSIS — F43.21 ADJUSTMENT DISORDER WITH DEPRESSED MOOD: ICD-10-CM

## 2022-07-11 DIAGNOSIS — E78.2 MIXED HYPERLIPIDEMIA: ICD-10-CM

## 2022-07-11 DIAGNOSIS — E03.9 HYPOTHYROIDISM, UNSPECIFIED TYPE: ICD-10-CM

## 2022-07-11 DIAGNOSIS — R82.90 MALODOROUS URINE: ICD-10-CM

## 2022-07-11 DIAGNOSIS — E11.9 TYPE 2 DIABETES MELLITUS WITHOUT COMPLICATION, WITHOUT LONG-TERM CURRENT USE OF INSULIN (H): Primary | ICD-10-CM

## 2022-07-11 PROBLEM — C44.622 SCC (SQUAMOUS CELL CARCINOMA), ARM, RIGHT: Status: ACTIVE | Noted: 2022-07-11

## 2022-07-11 LAB
ALBUMIN UR-MCNC: NEGATIVE MG/DL
ALT SERPL W P-5'-P-CCNC: 24 U/L (ref 0–50)
ANION GAP SERPL CALCULATED.3IONS-SCNC: 5 MMOL/L (ref 3–14)
APPEARANCE UR: CLEAR
BILIRUB UR QL STRIP: NEGATIVE
BUN SERPL-MCNC: 19 MG/DL (ref 7–30)
CALCIUM SERPL-MCNC: 9.6 MG/DL (ref 8.5–10.1)
CHLORIDE BLD-SCNC: 107 MMOL/L (ref 94–109)
CHOLEST SERPL-MCNC: 132 MG/DL
CO2 SERPL-SCNC: 26 MMOL/L (ref 20–32)
COLOR UR AUTO: YELLOW
CREAT SERPL-MCNC: 0.81 MG/DL (ref 0.52–1.04)
EST. AVERAGE GLUCOSE BLD GHB EST-MCNC: 128 MG/DL
FASTING STATUS PATIENT QL REPORTED: YES
GFR SERPL CREATININE-BSD FRML MDRD: 77 ML/MIN/1.73M2
GLUCOSE BLD-MCNC: 121 MG/DL (ref 70–99)
GLUCOSE UR STRIP-MCNC: NEGATIVE MG/DL
HBA1C MFR BLD: 6.1 % (ref 0–5.6)
HDLC SERPL-MCNC: 38 MG/DL
HGB UR QL STRIP: NEGATIVE
KETONES UR STRIP-MCNC: NEGATIVE MG/DL
LDLC SERPL CALC-MCNC: 65 MG/DL
LEUKOCYTE ESTERASE UR QL STRIP: ABNORMAL
NITRATE UR QL: NEGATIVE
NONHDLC SERPL-MCNC: 94 MG/DL
PH UR STRIP: 6.5 [PH] (ref 5–7)
POTASSIUM BLD-SCNC: 4.2 MMOL/L (ref 3.4–5.3)
RBC #/AREA URNS AUTO: ABNORMAL /HPF
SODIUM SERPL-SCNC: 138 MMOL/L (ref 133–144)
SP GR UR STRIP: 1.01 (ref 1–1.03)
SQUAMOUS #/AREA URNS AUTO: ABNORMAL /LPF
T4 FREE SERPL-MCNC: 1.35 NG/DL (ref 0.76–1.46)
TRIGL SERPL-MCNC: 143 MG/DL
TSH SERPL DL<=0.005 MIU/L-ACNC: 0.23 MU/L (ref 0.4–4)
UROBILINOGEN UR STRIP-ACNC: 0.2 E.U./DL
WBC #/AREA URNS AUTO: ABNORMAL /HPF

## 2022-07-11 PROCEDURE — 84443 ASSAY THYROID STIM HORMONE: CPT | Mod: ZL | Performed by: FAMILY MEDICINE

## 2022-07-11 PROCEDURE — 36415 COLL VENOUS BLD VENIPUNCTURE: CPT | Mod: ZL | Performed by: FAMILY MEDICINE

## 2022-07-11 PROCEDURE — 82947 ASSAY GLUCOSE BLOOD QUANT: CPT | Mod: ZL | Performed by: FAMILY MEDICINE

## 2022-07-11 PROCEDURE — 83036 HEMOGLOBIN GLYCOSYLATED A1C: CPT | Mod: ZL | Performed by: FAMILY MEDICINE

## 2022-07-11 PROCEDURE — 99214 OFFICE O/P EST MOD 30 MIN: CPT | Performed by: FAMILY MEDICINE

## 2022-07-11 PROCEDURE — 84460 ALANINE AMINO (ALT) (SGPT): CPT | Mod: ZL | Performed by: FAMILY MEDICINE

## 2022-07-11 PROCEDURE — 84439 ASSAY OF FREE THYROXINE: CPT | Mod: ZL | Performed by: FAMILY MEDICINE

## 2022-07-11 PROCEDURE — 82374 ASSAY BLOOD CARBON DIOXIDE: CPT | Mod: ZL | Performed by: FAMILY MEDICINE

## 2022-07-11 PROCEDURE — 81001 URINALYSIS AUTO W/SCOPE: CPT | Mod: ZL | Performed by: FAMILY MEDICINE

## 2022-07-11 PROCEDURE — 80061 LIPID PANEL: CPT | Mod: ZL | Performed by: FAMILY MEDICINE

## 2022-07-11 PROCEDURE — G0463 HOSPITAL OUTPT CLINIC VISIT: HCPCS

## 2022-07-11 ASSESSMENT — PAIN SCALES - GENERAL: PAINLEVEL: NO PAIN (0)

## 2022-07-11 NOTE — NURSING NOTE
"Chief Complaint   Patient presents with     Chronic Disease Management       Initial /78 (BP Location: Left arm, Patient Position: Sitting, Cuff Size: Adult Regular)   Pulse 69   Temp 97.2  F (36.2  C) (Tympanic)   Resp 20   Wt 85 kg (187 lb 8 oz)   SpO2 97%   BMI 31.20 kg/m   Estimated body mass index is 31.2 kg/m  as calculated from the following:    Height as of 6/15/22: 1.651 m (5' 5\").    Weight as of this encounter: 85 kg (187 lb 8 oz).  Medication Reconciliation: complete  Lara Dove LPN  "

## 2022-07-30 ENCOUNTER — HOSPITAL ENCOUNTER (EMERGENCY)
Facility: HOSPITAL | Age: 72
Discharge: HOME | End: 2022-07-30
Attending: EMERGENCY MEDICINE | Admitting: EMERGENCY MEDICINE
Payer: MEDICARE

## 2022-07-30 ENCOUNTER — APPOINTMENT (OUTPATIENT)
Dept: CT IMAGING | Facility: HOSPITAL | Age: 72
End: 2022-07-30
Attending: EMERGENCY MEDICINE
Payer: MEDICARE

## 2022-07-30 ENCOUNTER — APPOINTMENT (OUTPATIENT)
Dept: GENERAL RADIOLOGY | Facility: HOSPITAL | Age: 72
End: 2022-07-30
Attending: EMERGENCY MEDICINE
Payer: MEDICARE

## 2022-07-30 VITALS
SYSTOLIC BLOOD PRESSURE: 148 MMHG | OXYGEN SATURATION: 94 % | RESPIRATION RATE: 21 BRPM | TEMPERATURE: 97.1 F | HEART RATE: 81 BPM | DIASTOLIC BLOOD PRESSURE: 76 MMHG

## 2022-07-30 DIAGNOSIS — S09.90XA CLOSED HEAD INJURY, INITIAL ENCOUNTER: ICD-10-CM

## 2022-07-30 DIAGNOSIS — R55 SYNCOPE AND COLLAPSE: ICD-10-CM

## 2022-07-30 DIAGNOSIS — R55 SYNCOPE: ICD-10-CM

## 2022-07-30 LAB
ANION GAP SERPL CALCULATED.3IONS-SCNC: 4 MMOL/L (ref 3–14)
BASOPHILS # BLD AUTO: 0 10E3/UL (ref 0–0.2)
BASOPHILS NFR BLD AUTO: 0 %
BUN SERPL-MCNC: 20 MG/DL (ref 7–30)
CALCIUM SERPL-MCNC: 9 MG/DL (ref 8.5–10.1)
CHLORIDE BLD-SCNC: 106 MMOL/L (ref 94–109)
CO2 SERPL-SCNC: 27 MMOL/L (ref 20–32)
CREAT SERPL-MCNC: 0.9 MG/DL (ref 0.52–1.04)
EOSINOPHIL # BLD AUTO: 0 10E3/UL (ref 0–0.7)
EOSINOPHIL NFR BLD AUTO: 0 %
ERYTHROCYTE [DISTWIDTH] IN BLOOD BY AUTOMATED COUNT: 13.9 % (ref 10–15)
GFR SERPL CREATININE-BSD FRML MDRD: 68 ML/MIN/1.73M2
GLUCOSE BLD-MCNC: 121 MG/DL (ref 70–99)
HCT VFR BLD AUTO: 42.1 % (ref 35–47)
HGB BLD-MCNC: 13.8 G/DL (ref 11.7–15.7)
IMM GRANULOCYTES # BLD: 0 10E3/UL
IMM GRANULOCYTES NFR BLD: 0 %
LYMPHOCYTES # BLD AUTO: 1 10E3/UL (ref 0.8–5.3)
LYMPHOCYTES NFR BLD AUTO: 20 %
MAGNESIUM SERPL-MCNC: 2.2 MG/DL (ref 1.6–2.3)
MCH RBC QN AUTO: 28 PG (ref 26.5–33)
MCHC RBC AUTO-ENTMCNC: 32.8 G/DL (ref 31.5–36.5)
MCV RBC AUTO: 85 FL (ref 78–100)
MONOCYTES # BLD AUTO: 0.7 10E3/UL (ref 0–1.3)
MONOCYTES NFR BLD AUTO: 13 %
NEUTROPHILS # BLD AUTO: 3.4 10E3/UL (ref 1.6–8.3)
NEUTROPHILS NFR BLD AUTO: 67 %
NRBC # BLD AUTO: 0 10E3/UL
NRBC BLD AUTO-RTO: 0 /100
PLATELET # BLD AUTO: 152 10E3/UL (ref 150–450)
POTASSIUM BLD-SCNC: 4.1 MMOL/L (ref 3.4–5.3)
RBC # BLD AUTO: 4.93 10E6/UL (ref 3.8–5.2)
SODIUM SERPL-SCNC: 137 MMOL/L (ref 133–144)
TROPONIN I SERPL HS-MCNC: 5 NG/L
TROPONIN I SERPL HS-MCNC: 8 NG/L
WBC # BLD AUTO: 5.1 10E3/UL (ref 4–11)

## 2022-07-30 PROCEDURE — 99285 EMERGENCY DEPT VISIT HI MDM: CPT | Mod: 25

## 2022-07-30 PROCEDURE — 258N000003 HC RX IP 258 OP 636: Performed by: EMERGENCY MEDICINE

## 2022-07-30 PROCEDURE — 93010 ELECTROCARDIOGRAM REPORT: CPT | Performed by: INTERNAL MEDICINE

## 2022-07-30 PROCEDURE — 85025 COMPLETE CBC W/AUTO DIFF WBC: CPT | Performed by: EMERGENCY MEDICINE

## 2022-07-30 PROCEDURE — G1010 CDSM STANSON: HCPCS

## 2022-07-30 PROCEDURE — 36415 COLL VENOUS BLD VENIPUNCTURE: CPT | Performed by: EMERGENCY MEDICINE

## 2022-07-30 PROCEDURE — 99284 EMERGENCY DEPT VISIT MOD MDM: CPT | Performed by: EMERGENCY MEDICINE

## 2022-07-30 PROCEDURE — 71046 X-RAY EXAM CHEST 2 VIEWS: CPT

## 2022-07-30 PROCEDURE — 83735 ASSAY OF MAGNESIUM: CPT | Performed by: EMERGENCY MEDICINE

## 2022-07-30 PROCEDURE — 80048 BASIC METABOLIC PNL TOTAL CA: CPT | Performed by: EMERGENCY MEDICINE

## 2022-07-30 PROCEDURE — 93005 ELECTROCARDIOGRAM TRACING: CPT

## 2022-07-30 PROCEDURE — 84484 ASSAY OF TROPONIN QUANT: CPT | Performed by: EMERGENCY MEDICINE

## 2022-07-30 RX ADMIN — SODIUM CHLORIDE 1000 ML: 9 INJECTION, SOLUTION INTRAVENOUS at 10:10

## 2022-07-30 NOTE — ED TRIAGE NOTES
Pt had  A syncopal event this morning. Does not remember falling, but remembers waking up on floor. Pt states she doesn't not know why. This has not happened before.   Pt is diabetic, states she is controlled well with metformin and diet, did not eat breakfast.   Abrasions to RT eyebrow, LEFT hand, RT knee   Triage Assessment     Row Name 07/30/22 0980       Triage Assessment (Adult)    Airway WDL WDL       Respiratory WDL    Respiratory WDL WDL       Skin Circulation/Temperature WDL    Skin Circulation/Temperature WDL WDL       Cardiac WDL    Cardiac WDL WDL       Peripheral/Neurovascular WDL    Peripheral Neurovascular WDL WDL       Cognitive/Neuro/Behavioral WDL    Cognitive/Neuro/Behavioral WDL WDL

## 2022-07-30 NOTE — ED PROVIDER NOTES
EMERGENCY DEPARTMENT ENCOUNTER      NAME: Kayla Lugo  AGE: 72 year old female  YOB: 1950  MRN: [id number]  EVALUATION DATE & TIME: 2022  9:44 AM    PCP: Natalia Starr    ED PROVIDER: Henry Arana M.D.      Chief Complaint   Patient presents with     Syncope     0700 event       FINAL IMPRESSION:  1. Syncope and collapse    2. Closed head injury, initial encounter        ED COURSE & MEDICAL DECISION MAKIN year old female presents to the Emergency Department for evaluation of syncope.  Patient is vitally stable and nontoxic-appearing when she arrives to the emergency department.  She had an episode of syncope which occurred this morning.  It sounds like she felt sweaty and flushed and then went to the bathroom where she passed out for a short time she believes.  She does have some superficial right forehead trauma with no underlying fracture or intracranial injury on head CT.  EKG shows sinus rhythm without any acute ischemic changes.  High-sensitivity troponin and delta troponin are within normal limits ruling her out for silent ACS.  Patient did not have any other associated symptoms to suggest any kind of systemic illness.  Considered arrhythmia.  She has no evidence of conduction disturbance on her EKG today.  She was maintained on cardiac monitoring here with no evidence of ectopy or arrhythmia.  No metabolic disturbances.  Chest x-ray with no cardiac enlargement, clear lungs.  Discussed options with the patient, hospital observation versus discharge home with close outpatient follow-up.  Patient was in agreement with the latter approach.  I am going to order an echocardiogram for her to evaluate for any structural abnormalities to account for her arrhythmia.  She is well connected in primary care clinic and will reach out to them on Monday to set up some follow-up.  She will be having someone stay with her today to monitor for any other immediate concerns.  She will  return to the ED if she has further lightheadedness, syncope, or any other new acutely concerning symptoms.     At the conclusion of the encounter I discussed the results of all of the tests and the disposition. The questions were answered. The patient or family acknowledged understanding and was agreeable with the care plan.       MEDICATIONS GIVEN IN THE EMERGENCY:  Medications   0.9% sodium chloride BOLUS (0 mLs Intravenous Stopped 7/30/22 9105)       NEW PRESCRIPTIONS STARTED AT TODAY'S ER VISIT  Discharge Medication List as of 7/30/2022  1:55 PM             =================================================================    HPI    Patient information was obtained from: Patient, EMS      Kayla Lugo is a 72 year old female with a pertinent history of  who presents to this ED today for evaluation of lightheadedness and syncope.  Patient had an episode this morning, she was folding some laundry when she suddenly felt flushed and lightheaded.  She went to the bathroom where she collapsed and hit the right side of her head on something.  She awoke after what she thinks was a very short time and talk to her family who advised that she call 911.  She currently denies any pain.  She says she woke up this morning feeling normal.  She did hit the right side of her head but does not report severe headache, neck pain, back pain.  She also has a small contusion to the top of her left hand but is able to move her fingers normally.  Patient has never had an episode like this.  She denies any leg swelling or pain.  Denies abdominal pain.  Denies chest pain.  Reports she did eat breakfast this morning.  Currently denies any complaints of lightheadedness or other symptoms.      REVIEW OF SYSTEMS   All systems reviewed and negative except as noted in HPI.    PAST MEDICAL HISTORY:  Past Medical History:   Diagnosis Date     Diabetes mellitus, type 2 (H) 10/22/2014     Eczema 06/09/2011     Esophageal reflux 06/09/2011      Helicobacter pylori (H. pylori) 06/09/2011    history of      Osteoarthrosis, unspecified whether generalized or localized, lower leg 03/15/2001    Yuval Rapp MD      Personal history of other endocrine, metabolic, and immunity disorders 06/09/2011     Unspecified acquired hypothyroidism 06/09/2011    Natalia Starr MD     Unspecified essential hypertension 03/27/2001    Geremias Rouse MD      Unspecified hearing loss, left 06/09/2011       PAST SURGICAL HISTORY:  Past Surgical History:   Procedure Laterality Date     ARTHROSCOPY KNEE  2001    RT      COLONOSCOPY  2004     COLONOSCOPY  06/13/2012    Dr. Gorman; 5 year recall given     COLONOSCOPY  07/11/2017     colonoscopy with polypectomy  2007    repeat in five      DILATION AND CURETTAGE  1996     EXCISE LESION LIP Right 9/16/2014    Procedure: EXCISE LESION LIP;  Surgeon: Bri Flores MD;  Location: [facility] OR     ORTHOPEDIC SURGERY Right 10-5-2015    right shoulder arthoscopy [facility]     NADIRA with BSO  1997           CURRENT MEDICATIONS:    No current facility-administered medications for this encounter.     Current Outpatient Medications   Medication     atorvastatin (LIPITOR) 10 MG tablet     blood glucose (ACCU-CHEK SMARTVIEW) test strip     blood glucose calibration (ACCU-CHEK SMARTVIEW CONTROL) solution     blood glucose monitoring (ACCU-CHEK FASTCLIX) lancets     blood glucose monitoring (ACCU-CHEK DESTINI SMARTVIEW) meter device kit     estradiol (ESTRACE) 0.1 MG/GM vaginal cream     fluocinonide (LIDEX) 0.05 % ointment     levothyroxine (SYNTHROID/LEVOTHROID) 100 MCG tablet     metFORMIN (GLUCOPHAGE-XR) 500 MG 24 hr tablet     polyethylene glycol (MIRALAX) 17 GM/Dose powder     triamcinolone (KENALOG) 0.1 % ointment     vitamin D3 (CHOLECALCIFEROL) 1000 units (25 mcg) tablet         ALLERGIES:  Allergies   Allergen Reactions     Hydrochlorothiazide Rash     Atenolol Other (See Comments)     Bradycardia      Lisinopril Cough       FAMILY  HISTORY:  Family History   Problem Relation Age of Onset     Alcohol/Drug Father         alcoholism     C.A.D. Father         (cause of death)      Cancer Mother         basal cell     Ovarian Cancer Mother      Alcohol/Drug Son         alcoholism      C.A.D. Son      Diabetes Brother      Diabetes Maternal Grandmother        SOCIAL HISTORY:   Social History     Socioeconomic History     Marital status:    Tobacco Use     Smoking status: Never Smoker     Smokeless tobacco: Never Used   Substance and Sexual Activity     Alcohol use: No     Alcohol/week: 0.0 standard drinks     Drug use: No     Sexual activity: Not Currently   Other Topics Concern     Blood Transfusions Yes     Caffeine Concern Yes     Comment: Coffee 3 cups daily      Exercise Yes     Comment: Walking        VITALS:  /76   Pulse 81   Temp 97.1  F (36.2  C) (Tympanic)   Resp 21   SpO2 94%     PHYSICAL EXAM    Constitutional: Well developed, Well nourished, NAD.  HENT: There is a small contusion to the right frontal scalp.  Head is otherwise atraumatic.  Neck is supple with no midline tenderness.  Eyes: EOMI, Conjunctiva normal.  Respiratory: Breathing comfortably on room air. Speaks full sentences easily. Lungs clear to ascultation.  Cardiovascular: Normal heart rate, Regular rhythm. No murmur. No peripheral edema.  Abdomen: Soft, nontender  Musculoskeletal: Good range of motion in all major joints. No major deformities noted.  There is a small contusion to the MCP joints of the fourth and fifth digit on the left hand.  Patient is able to flex and extend all digits normally at all joints.  Integument: Warm, Dry.  Neurologic: Alert & awake, Normal motor function, Normal sensory function, No focal deficits noted.   Psychiatric: Cooperative. Affect appropriate.     LAB:  All pertinent labs reviewed and interpreted.  Labs Ordered and Resulted from Time of ED Arrival to Time of ED Departure   BASIC METABOLIC PANEL - Abnormal       Result  Value    Sodium 137      Potassium 4.1      Chloride 106      Carbon Dioxide (CO2) 27      Anion Gap 4      Urea Nitrogen 20      Creatinine 0.90      Calcium 9.0      Glucose 121 (*)     GFR Estimate 68     TROPONIN I - Normal    Troponin I High Sensitivity 5     MAGNESIUM - Normal    Magnesium 2.2     TROPONIN I - Normal    Troponin I High Sensitivity 8     CBC WITH PLATELETS AND DIFFERENTIAL    WBC Count 5.1      RBC Count 4.93      Hemoglobin 13.8      Hematocrit 42.1      MCV 85      MCH 28.0      MCHC 32.8      RDW 13.9      Platelet Count 152      % Neutrophils 67      % Lymphocytes 20      % Monocytes 13      % Eosinophils 0      % Basophils 0      % Immature Granulocytes 0      NRBCs per 100 WBC 0      Absolute Neutrophils 3.4      Absolute Lymphocytes 1.0      Absolute Monocytes 0.7      Absolute Eosinophils 0.0      Absolute Basophils 0.0      Absolute Immature Granulocytes 0.0      Absolute NRBCs 0.0         RADIOLOGY:  Reviewed all pertinent imaging. Please see official radiology report.  Chest XR,  PA & LAT   Final Result   IMPRESSION:  No acute cardiopulmonary disease.        BHARTI CONTRERAS MD            SYSTEM ID:  [id number]      Head CT w/o contrast   Final Result   IMPRESSION: No acute brain abnormality.        BHARTI CONTRERAS MD            SYSTEM ID:  [id number]      Echocardiogram Complete    (Results Pending)       EKG:    Performed at: 1003    Impression: Sinus rhythm, normal ECG    Rate: 76  Rhythm: Sinus  Axis: Normal  MA Interval: 170  QRS Interval: 110  QTc Interval: 463  ST Changes: None  Comparison: Compared to 9/25/2015, incomplete right bundle branch block criteria are no longer met, no other changes    I have independently reviewed and interpreted the EKG(s) documented above.      Henry Arana M.D.  Emergency Medicine  [facility]  [address] [place] [id number] [phone]  Dept: [phone]       Henry Arana MD  07/30/22 4594

## 2022-07-30 NOTE — DISCHARGE INSTRUCTIONS
You were seen today in the emergency department at [facility] for syncope (passing out).  Your evaluation included heart testing, cardiac monitoring, and blood testing related to your heart and lungs.  We also performed a CT scan which was negative for significant head trauma.  We would like you to follow-up as soon as possible in clinic.  We have ordered an echocardiogram for you to get done as soon as possible as an outpatient.  If you have recurrent severe lightheadedness or other immediate concern we can reevaluate you at any time in the emergency department.  Please make sure you are drinking plenty of liquids to stay well-hydrated.

## 2022-07-30 NOTE — ED NOTES
Up at bedside, ambulating without c/o. Remains NSR 80s. No ectopy see on single lead ECG monitoring.   BP remains WDLs.

## 2022-08-01 ENCOUNTER — TELEPHONE (OUTPATIENT)
Dept: FAMILY MEDICINE | Facility: OTHER | Age: 72
End: 2022-08-01

## 2022-08-01 DIAGNOSIS — R55 SYNCOPE, UNSPECIFIED SYNCOPE TYPE: Primary | ICD-10-CM

## 2022-08-01 NOTE — TELEPHONE ENCOUNTER
Pt fainted and fell in bathroom Saturday.Head injury. No new or worsening s/s. Needs Follow-up 8.1.2022 with PCP per ED. Offered appt with covering provider and she declined.    Will send OVERBOOK for PCP/Only for Tuesday.OK?    Call pt and leave detailed message about date and time of appt with call back at [phone].    Juanita Lima RN      Emergency Department and Urgent Care Follow-up      Reason for ER/UC visit: fell,head injury,fainted  o Date seen:7.30.2022      New or Worsening symptoms:  no      Prescription Received/Picked up from Pharmacy?:    o Medications started? no  o Any questions or issues regarding your prescription?:       Follow-up Results or Labs that are pending:       Questions or concerns?: no      ER Recommends Follow-up by:       RN Recommendations:   o Appointment scheduled:     If you start feeling worse, or have any further questions, please feel free to contact Nurse Triage at [phone].  If needing immediate medical attention at any time please call 911/Go to the ER.

## 2022-08-03 DIAGNOSIS — R55 SYNCOPE, UNSPECIFIED SYNCOPE TYPE: Primary | ICD-10-CM

## 2022-08-03 NOTE — PROGRESS NOTES
"  Assessment & Plan     1. Syncope, unspecified syncope type  Await echo results.  She is now also scheduled with Cardiology and does have Zio ordered.  Follow-up here as needed.       BMI:   Estimated body mass index is 30.67 kg/m  as calculated from the following:    Height as of this encounter: 1.651 m (5' 5\").    Weight as of this encounter: 83.6 kg (184 lb 4.8 oz).     Return if symptoms worsen or fail to improve.    Natalia Starr MD  [facility] DESHAUN Garza is a 72 year old presenting for the following health issues:  ER F/U      HPI     ED/UC Followup:    Facility:  [place]  Date of visit: 07/30/22  Reason for visit: Syncope and collapse  Current Status: feeling fine ever since    Patient is now scheduled for an echo.  She states she is feeling fine.    Review of Systems   Constitutional, HEENT, cardiovascular, pulmonary, gi and gu systems are negative, except as otherwise noted.      Objective    /86 (BP Location: Left arm, Patient Position: Sitting, Cuff Size: Adult Regular)   Pulse 72   Temp 97.5  F (36.4  C) (Tympanic)   Resp 18   Ht 1.651 m (5' 5\")   Wt 83.6 kg (184 lb 4.8 oz)   SpO2 98%   BMI 30.67 kg/m    Body mass index is 30.67 kg/m .  Physical Exam   GENERAL: healthy, alert and no distress  PSYCH: mentation appears normal, affect normal/bright                .  ..  "

## 2022-08-04 ENCOUNTER — OFFICE VISIT (OUTPATIENT)
Dept: FAMILY MEDICINE | Facility: OTHER | Age: 72
End: 2022-08-04
Attending: FAMILY MEDICINE
Payer: MEDICARE

## 2022-08-04 VITALS
HEIGHT: 65 IN | RESPIRATION RATE: 18 BRPM | HEART RATE: 72 BPM | BODY MASS INDEX: 30.71 KG/M2 | WEIGHT: 184.3 LBS | SYSTOLIC BLOOD PRESSURE: 132 MMHG | TEMPERATURE: 97.5 F | OXYGEN SATURATION: 98 % | DIASTOLIC BLOOD PRESSURE: 86 MMHG

## 2022-08-04 DIAGNOSIS — R55 SYNCOPE, UNSPECIFIED SYNCOPE TYPE: Primary | ICD-10-CM

## 2022-08-04 PROCEDURE — 99213 OFFICE O/P EST LOW 20 MIN: CPT | Performed by: FAMILY MEDICINE

## 2022-08-04 PROCEDURE — G0463 HOSPITAL OUTPT CLINIC VISIT: HCPCS

## 2022-08-04 ASSESSMENT — PAIN SCALES - GENERAL: PAINLEVEL: NO PAIN (0)

## 2022-08-04 NOTE — NURSING NOTE
"Chief Complaint   Patient presents with     ER F/U       Initial /86 (BP Location: Left arm, Patient Position: Sitting, Cuff Size: Adult Regular)   Pulse 72   Temp 97.5  F (36.4  C) (Tympanic)   Resp 18   Ht 1.651 m (5' 5\")   Wt 83.6 kg (184 lb 4.8 oz)   SpO2 98%   BMI 30.67 kg/m   Estimated body mass index is 30.67 kg/m  as calculated from the following:    Height as of this encounter: 1.651 m (5' 5\").    Weight as of this encounter: 83.6 kg (184 lb 4.8 oz).  Medication Reconciliation: complete  Chloe Dinero MA  "

## 2022-08-09 ENCOUNTER — HOSPITAL ENCOUNTER (OUTPATIENT)
Dept: CARDIOLOGY | Facility: HOSPITAL | Age: 72
Discharge: HOME | End: 2022-08-09
Attending: NURSE PRACTITIONER | Admitting: INTERNAL MEDICINE
Payer: MEDICARE

## 2022-08-09 DIAGNOSIS — R55 SYNCOPE, UNSPECIFIED SYNCOPE TYPE: ICD-10-CM

## 2022-08-09 PROCEDURE — 93248 EXT ECG>7D<15D REV&INTERPJ: CPT | Performed by: INTERNAL MEDICINE

## 2022-08-09 PROCEDURE — 93246 EXT ECG>7D<15D RECORDING: CPT

## 2022-08-22 DIAGNOSIS — E11.9 TYPE 2 DIABETES MELLITUS WITHOUT COMPLICATION, WITHOUT LONG-TERM CURRENT USE OF INSULIN (H): ICD-10-CM

## 2022-08-22 DIAGNOSIS — E03.9 HYPOTHYROIDISM, UNSPECIFIED TYPE: ICD-10-CM

## 2022-08-23 RX ORDER — METFORMIN HCL 500 MG
TABLET, EXTENDED RELEASE 24 HR ORAL
Qty: 180 TABLET | Refills: 1 | Status: SHIPPED | OUTPATIENT
Start: 2022-08-23 | End: 2023-02-21

## 2022-08-23 RX ORDER — LEVOTHYROXINE SODIUM 100 UG/1
TABLET ORAL
Qty: 90 TABLET | Refills: 1 | Status: SHIPPED | OUTPATIENT
Start: 2022-08-23 | End: 2023-02-21

## 2022-08-23 NOTE — TELEPHONE ENCOUNTER
Levothyroxine      Last Written Prescription Date:  2/21/22  Last Fill Quantity: 90,   # refills: 1  Last Office Visit: 8/4/22  Future Office visit:       Routing refill request to provider for review/approval because:      Metformin      Last Written Prescription Date:  2/21/22  Last Fill Quantity: 180,   # refills: 1  Last Office Visit: 8/4/22  Future Office visit:       Routing refill request to provider for review/approval because:

## 2022-08-25 ENCOUNTER — HOSPITAL ENCOUNTER (OUTPATIENT)
Dept: CARDIOLOGY | Facility: HOSPITAL | Age: 72
Discharge: HOME | End: 2022-08-25
Attending: FAMILY MEDICINE | Admitting: INTERNAL MEDICINE
Payer: MEDICARE

## 2022-08-25 DIAGNOSIS — R55 SYNCOPE, UNSPECIFIED SYNCOPE TYPE: ICD-10-CM

## 2022-08-25 LAB — LVEF ECHO: NORMAL

## 2022-08-25 PROCEDURE — 93306 TTE W/DOPPLER COMPLETE: CPT

## 2022-09-01 ENCOUNTER — OFFICE VISIT (OUTPATIENT)
Dept: CARDIOLOGY | Facility: OTHER | Age: 72
End: 2022-09-01
Attending: NURSE PRACTITIONER
Payer: MEDICARE

## 2022-09-01 ENCOUNTER — PATIENT OUTREACH (OUTPATIENT)
Dept: CARE COORDINATION | Facility: OTHER | Age: 72
End: 2022-09-01

## 2022-09-01 VITALS
WEIGHT: 184 LBS | DIASTOLIC BLOOD PRESSURE: 83 MMHG | RESPIRATION RATE: 16 BRPM | BODY MASS INDEX: 30.66 KG/M2 | SYSTOLIC BLOOD PRESSURE: 145 MMHG | HEART RATE: 80 BPM | OXYGEN SATURATION: 97 % | HEIGHT: 65 IN | TEMPERATURE: 97.7 F

## 2022-09-01 DIAGNOSIS — N89.8 VAGINAL ODOR: ICD-10-CM

## 2022-09-01 DIAGNOSIS — Z82.49 FAMILY HISTORY OF ISCHEMIC HEART DISEASE: ICD-10-CM

## 2022-09-01 DIAGNOSIS — I10 ESSENTIAL HYPERTENSION: Primary | ICD-10-CM

## 2022-09-01 DIAGNOSIS — I51.7 ASYMMETRIC SEPTAL HYPERTROPHY: ICD-10-CM

## 2022-09-01 DIAGNOSIS — I10 ESSENTIAL HYPERTENSION: ICD-10-CM

## 2022-09-01 DIAGNOSIS — E78.2 MIXED HYPERLIPIDEMIA: ICD-10-CM

## 2022-09-01 DIAGNOSIS — E66.811 CLASS 1 OBESITY WITHOUT SERIOUS COMORBIDITY WITH BODY MASS INDEX (BMI) OF 30.0 TO 30.9 IN ADULT, UNSPECIFIED OBESITY TYPE: ICD-10-CM

## 2022-09-01 DIAGNOSIS — R55 SYNCOPE, UNSPECIFIED SYNCOPE TYPE: Primary | ICD-10-CM

## 2022-09-01 DIAGNOSIS — E11.9 TYPE 2 DIABETES MELLITUS WITHOUT COMPLICATION, WITHOUT LONG-TERM CURRENT USE OF INSULIN (H): ICD-10-CM

## 2022-09-01 DIAGNOSIS — E03.9 HYPOTHYROIDISM, UNSPECIFIED TYPE: ICD-10-CM

## 2022-09-01 LAB
T4 FREE SERPL-MCNC: 1.39 NG/DL (ref 0.76–1.46)
TSH SERPL DL<=0.005 MIU/L-ACNC: 0.19 MU/L (ref 0.4–4)

## 2022-09-01 PROCEDURE — 84439 ASSAY OF FREE THYROXINE: CPT | Mod: ZL | Performed by: NURSE PRACTITIONER

## 2022-09-01 PROCEDURE — 99214 OFFICE O/P EST MOD 30 MIN: CPT | Performed by: NURSE PRACTITIONER

## 2022-09-01 PROCEDURE — 36415 COLL VENOUS BLD VENIPUNCTURE: CPT | Mod: ZL | Performed by: NURSE PRACTITIONER

## 2022-09-01 PROCEDURE — 93005 ELECTROCARDIOGRAM TRACING: CPT

## 2022-09-01 PROCEDURE — 84443 ASSAY THYROID STIM HORMONE: CPT | Mod: ZL | Performed by: NURSE PRACTITIONER

## 2022-09-01 PROCEDURE — G0463 HOSPITAL OUTPT CLINIC VISIT: HCPCS | Mod: 25

## 2022-09-01 PROCEDURE — 93010 ELECTROCARDIOGRAM REPORT: CPT | Performed by: INTERNAL MEDICINE

## 2022-09-01 RX ORDER — LACTOBACILLUS RHAMNOSUS GG 10B CELL
1 CAPSULE ORAL DAILY
COMMUNITY
Start: 2022-09-01 | End: 2023-07-17

## 2022-09-01 ASSESSMENT — PAIN SCALES - GENERAL: PAINLEVEL: NO PAIN (0)

## 2022-09-01 NOTE — PATIENT INSTRUCTIONS
Reviewed all results. Suspect vasovagal syncope. If recurrence we should consider implantable loop recorder  Echocardiogram in 1 year to follow-up on thickened basal septum.  Referral to GYN- you should receive a call to schedule this  Follow-up with cardiology as needed.

## 2022-09-01 NOTE — NURSING NOTE
"Chief Complaint   Patient presents with     Consult     Syncope, follow up ED visit       Initial BP (!) 145/83 (BP Location: Left arm, Cuff Size: Adult Large)   Pulse 80   Temp 97.7  F (36.5  C) (Tympanic)   Resp 16   Ht 1.651 m (5' 5\")   Wt 83.5 kg (184 lb)   SpO2 97%   BMI 30.62 kg/m   Estimated body mass index is 30.62 kg/m  as calculated from the following:    Height as of this encounter: 1.651 m (5' 5\").    Weight as of this encounter: 83.5 kg (184 lb).  Medication Reconciliation: complete  Anamaria Grover LPN  "

## 2022-09-01 NOTE — PROGRESS NOTES
[facility] HEART CARE   CARDIOLOGY CONSULT     Kayla Lugo   [address] [place] [address] [address]  [facility] [id number]    [facility]     Chief Complaint   Patient presents with     Consult     Syncope, follow up ED visit        HPI:     Kayla Lugo is a 72-year old female who was referred by primary care provider for consult regarding syncopal episode. Mrs. Lugo presented to the local emergency department on 2022 for evaluation of syncopal episode. According to ED documentation she was awake, alert and vitally stable upon arrival to department. Work-up including EKG, troponin, BMP, magnesium, CBC, CXR, head CT were all unremarkable and unrevealing for etiology of syncopal episode. Mrs. Lugo tells me that the day prior to her syncopal episode she had her 20-month grandchild all day. She does not think she had eaten or drank much the day prior due to being busy with her grandchild. It was also a warm day and she had not yet turned on her air conditioning. The morning of the incident she woke up feeling well and was throwing a pile of laundry in front of the basement door down the basement. She denies any chest pain, dyspnea, palpitations, headache prior to syncopal episode.      She has a cardiac history including hypertension, hyperlipidemia. She has a noncardiac history of diabetes mellitus, hypothyroidism, .     Denies chest/intrascapular/arm/neck/jaw discomfort. No sensation of palpitations/fluttering/skipping in chest. No dyspnea, dyspnea on exertion. No lower extremity edema. She enjoys walking and can do this without symptoms (typically walks 4-5 blocks).     Sleeping history: Sleeps in bed, she does snore. No witness apnea. No orthopnea, PND. Wakes up early 3:30/4 am and feels well rested.     Smoking History: Never smoked    Substance Abuse History: None    Alcohol Abuse History: None    Family History: Father-  at 49 y.o. due to alcohol abuse, mom- passed away at age 81 years old d/t issues with her  lungs, son- aged 51 and has had three MIs.           RELEVANT TESTING  Leadless EKG Monitor 8/2022  Official reading pending.   Review of zio report no tachybrady arrhythmia. No pauses or heart block.      Echocardiogram 8/2022   Interpretation Summary  Global and regional left ventricular function is hyperkinetic with an EF of  65-70%.  Mild thickening of the basal septum, but no doppler evidence of flow  acceleration in the LVOT and no obvious HOWARD of the mitral valve.  Global right ventricular function is normal.  No hemodynamically significant valve abnormalities.  The inferior vena cava is normal.  There is no prior study for direct comparison.      PAST MEDICAL HISTORY:   Past Medical History:   Diagnosis Date     Diabetes mellitus, type 2 (H) 10/22/2014     Eczema 06/09/2011     Esophageal reflux 06/09/2011     Helicobacter pylori (H. pylori) 06/09/2011    history of      Osteoarthrosis, unspecified whether generalized or localized, lower leg 03/15/2001    Yuval Rapp MD      Personal history of other endocrine, metabolic, and immunity disorders 06/09/2011     Unspecified acquired hypothyroidism 06/09/2011    Natalia Starr MD     Unspecified essential hypertension 03/27/2001    Geremias Rouse MD      Unspecified hearing loss, left 06/09/2011          FAMILY HISTORY:   Family History   Problem Relation Age of Onset     Alcohol/Drug Father         alcoholism     C.A.D. Father         (cause of death)      Cancer Mother         basal cell     Ovarian Cancer Mother      Alcohol/Drug Son         alcoholism      C.A.D. Son      Diabetes Brother      Diabetes Maternal Grandmother           PAST SURGICAL HISTORY:   Past Surgical History:   Procedure Laterality Date     ARTHROSCOPY KNEE  2001    RT      COLONOSCOPY  2004     COLONOSCOPY  06/13/2012    Dr. Gorman; 5 year recall given     COLONOSCOPY  07/11/2017     colonoscopy with polypectomy  2007    repeat in five      DILATION AND CURETTAGE  1996      EXCISE LESION LIP Right 9/16/2014    Procedure: EXCISE LESION LIP;  Surgeon: Bri Flores MD;  Location: [facility] OR     ORTHOPEDIC SURGERY Right 10-5-2015    right shoulder arthoscopy [facility]     NADIRA with BSO  1997          SOCIAL HISTORY:   Social History     Socioeconomic History     Marital status:      Spouse name: None     Number of children: None     Years of education: None     Highest education level: None   Tobacco Use     Smoking status: Never Smoker     Smokeless tobacco: Never Used   Substance and Sexual Activity     Alcohol use: No     Alcohol/week: 0.0 standard drinks     Drug use: No     Sexual activity: Not Currently   Other Topics Concern     Blood Transfusions Yes     Caffeine Concern Yes     Comment: Coffee 3 cups daily      Exercise Yes     Comment: Walking           CURRENT MEDICATIONS:   Prior to Admission medications    Medication Sig Start Date End Date Taking? Authorizing Provider   atorvastatin (LIPITOR) 10 MG tablet Take 1 tablet (10 mg) by mouth daily 3/24/22  Yes Natalia Starr MD   blood glucose (ACCU-CHEK SMARTVIEW) test strip Use to test blood sugar one time daily. 12/1/20  Yes Natalia Starr MD   blood glucose calibration (ACCU-CHEK SMARTVIEW CONTROL) solution Use to calibrate blood glucose monitor as directed. 11/30/17  Yes Natalia Starr MD   blood glucose monitoring (ACCU-CHEK FASTCLIX) lancets USE TO TEST BLOOD SUGARS ONCE DAILY 12/1/20  Yes Natalia Starr MD   blood glucose monitoring (ACCU-CHEK DESTINI SMARTVIEW) meter device kit Use to test blood sugar one time daily. 11/30/17  Yes Natalia Starr MD   levothyroxine (SYNTHROID/LEVOTHROID) 100 MCG tablet TAKE 1 TABLET(100 MCG) BY MOUTH DAILY 8/23/22  Yes Natalia Starr MD   metFORMIN (GLUCOPHAGE XR) 500 MG 24 hr tablet TAKE 2 TABLETS(1000 MG) BY MOUTH AT BEDTIME 8/23/22  Yes Natalia Starr MD   polyethylene glycol (MIRALAX) 17 GM/Dose powder TAKE 17 GRAMS BY MOUTH DAILY. MIX IN  "BEVERAGE OF CHOICE 4/16/21  Yes Natalia Starr MD   vitamin D3 (CHOLECALCIFEROL) 1000 units (25 mcg) tablet Take 4 tablets by mouth daily.   Yes Reported, Patient   triamcinolone (KENALOG) 0.1 % ointment Apply topically 2 times daily  Patient not taking: Reported on 9/1/2022    Reported, Patient          ALLERGIES:   Allergies   Allergen Reactions     Hydrochlorothiazide Rash     Atenolol Other (See Comments)     Bradycardia      Lisinopril Cough          ROS:   CONSTITUTIONAL: No reported fever or chills. No changes in weight.  ENT: No visual disturbance, ear ache, epistaxis or sore throat.   CARDIOVASCULAR: No chest pain, chest pressure or chest discomfort. No palpitations or lower extremity edema.   RESPIRATORY: No shortness of breath, dyspnea upon exertion, cough, wheezing or hemoptysis.   GI: No abdominal pain. No nausea, vomiting or diarrhea. No melena or hematochezia. No changes in bowel habits.   : No hematuria or dysuria. No hesitancy or incontinence.   NEUROLOGICAL: +syncope (See HPI) No headache, lightheadedness, dizziness,  ataxia, paresthesias or weakness.   HEMATOLOGIC: No history of anemia. No bleeding or excessive bruising. No history of blood clots.   MUSCULOSKELETAL: No joint pain or swelling, no muscle pain.  ENDOCRINOLOGIC: No temperature intolerance. No hair or skin changes.  SKIN: No abnormal rashes or sores, no unusual itching.  PSYCHIATRIC: No history of depression or anxiety. No changes in mood, feeling down or anxious. No changes in sleep.      PHYSICAL EXAM:     Vitals: BP (!) 145/83 (BP Location: Left arm, Cuff Size: Adult Large)   Pulse 80   Temp 97.7  F (36.5  C) (Tympanic)   Resp 16   Ht 1.651 m (5' 5\")   Wt 83.5 kg (184 lb)   SpO2 97%   BMI 30.62 kg/m    BMI= Body mass index is 30.62 kg/m .    GENERAL: The patient is a well-developed, well-nourished, in no apparent distress.  HEENT: Head is normocephalic and atraumatic. Eyes are symmetrical with normal visual tracking. " No icterus, no xanthelasmas. Nares appeared normal without nasal drainage. Mucous membranes are moist, no cyanosis.  NECK: Supple. No adenopathy, asymmetry or masses. Carotid upstroke are normal bilaterally, no cervical bruits, JVP not visible.   CHEST/ LUNGS: Lungs clear to auscultation, no rales, rhonchi or wheezes, no use of accessory muscles, no retractions, respirations unlabored and normal respiratory rate.   CARDIO: Regular rate and rhythm normal with S1 and S2, no S3 or S4 and no murmur, click or rub. Precordium quiet with normal PMI.    ABD: Abdomen is soft and nontender, nondistended. Without hepatosplenomegaly, no palpable masses, aorta not enlarged to palpitation and no abdominal bruits heard.   EXTREMITIES: No clubbing, cyanosis or edema present. Peripheral pulses normal and equal bilaterally.  VASC: Radial, femoral, dorsalis pedis and posterior tibialis pulses normal and symmetric with no vascular bruits heard.  MUSCULOSKELETAL: No joint swelling.   NEUROLOGIC: Alert and oriented X3. Normal speech, gait and affect. No focal neurologic deficits.   SKIN: No jaundice. No rashes or visible skin lesions present. No ecchymosis.            EKG Interpretation:      Rhythm: sinus  Rate: normal- 77 bpm  Axis: Normal  Ectopy: None  Conduction: normal RAMAN 168 ms, normal QRS duration 104 ms, normal  ms, normal QTc 452 ms  ST Segments/ T Waves: No ST-T wave changes and No acute ischemic changes  Q Waves: V1  Comparison to prior: no significant changes form 7/2022 EKG        LAB RESULTS:   Office Visit on 07/11/2022   Component Date Value Ref Range Status     Color Urine 07/11/2022 Yellow  Colorless, Straw, Light Yellow, Yellow Final     Appearance Urine 07/11/2022 Clear  Clear Final     Glucose Urine 07/11/2022 Negative  Negative mg/dL Final     Bilirubin Urine 07/11/2022 Negative  Negative Final     Ketones Urine 07/11/2022 Negative  Negative mg/dL Final     Specific Gravity Urine 07/11/2022 1.010  1.003 -  1.035 Final     Blood Urine 07/11/2022 Negative  Negative Final     pH Urine 07/11/2022 6.5  5.0 - 7.0 Final     Protein Albumin Urine 07/11/2022 Negative  Negative mg/dL Final     Urobilinogen Urine 07/11/2022 0.2  0.2, 1.0 E.U./dL Final     Nitrite Urine 07/11/2022 Negative  Negative Final     Leukocyte Esterase Urine 07/11/2022 Trace (A) Negative Final     TSH 07/11/2022 0.23 (A) 0.40 - 4.00 mU/L Final     Cholesterol 07/11/2022 132  <200 mg/dL Final     Triglycerides 07/11/2022 143  <150 mg/dL Final     Direct Measure HDL 07/11/2022 38 (A) >=50 mg/dL Final     LDL Cholesterol Calculated 07/11/2022 65  <=100 mg/dL Final     Non HDL Cholesterol 07/11/2022 94  <130 mg/dL Final     Patient Fasting > 8hrs? 07/11/2022 Yes   Final     Estimated Average Glucose 07/11/2022 128  mg/dL Final     Hemoglobin A1C 07/11/2022 6.1 (A) 0.0 - 5.6 % Final     Sodium 07/11/2022 138  133 - 144 mmol/L Final     Potassium 07/11/2022 4.2  3.4 - 5.3 mmol/L Final     Chloride 07/11/2022 107  94 - 109 mmol/L Final     Carbon Dioxide (CO2) 07/11/2022 26  20 - 32 mmol/L Final     Anion Gap 07/11/2022 5  3 - 14 mmol/L Final     Urea Nitrogen 07/11/2022 19  7 - 30 mg/dL Final     Creatinine 07/11/2022 0.81  0.52 - 1.04 mg/dL Final     Calcium 07/11/2022 9.6  8.5 - 10.1 mg/dL Final     Glucose 07/11/2022 121 (A) 70 - 99 mg/dL Final     GFR Estimate 07/11/2022 77  >60 mL/min/1.73m2 Final     ALT 07/11/2022 24  0 - 50 U/L Final     RBC Urine 07/11/2022 None Seen  0-2 /HPF /HPF Final     WBC Urine 07/11/2022 0-5  0-5 /HPF /HPF Final     Squamous Epithelials Urine 07/11/2022 Few (A) None Seen /LPF Final     Free T4 07/11/2022 1.35  0.76 - 1.46 ng/dL Final          ASSESSMENT:   Kayla Lugo is a 72-year old female who was referred by primary care provider for consult regarding syncopal episode. Mrs. Lugo presented to the local emergency department on July 30, 2022 for evaluation of syncopal episode. She has a cardiac history including  hypertension, hyperlipidemia. She has a noncardiac history of diabetes mellitus, hypothyroidism, .     Denies chest/intrascapular/arm/neck/jaw discomfort. No sensation of palpitations/fluttering/skipping in chest. No dyspnea, dyspnea on exertion. No lower extremity edema. She enjoys walking and can do this without symptoms (typically walks 4-5 blocks).         ICD-10-CM    1. Syncope, unspecified syncope type  R55 TSH with free T4 reflex     Echocardiogram Complete     TSH with free T4 reflex     T4 free   2. Essential hypertension  I10    3. Mixed hyperlipidemia  E78.2    4. Asymmetric septal hypertrophy (H)  I42.2    5. Type 2 diabetes mellitus without complication, without long-term current use of insulin (H)  E11.9    6. Hypothyroidism, unspecified type  E03.9 TSH with free T4 reflex     TSH with free T4 reflex     T4 free   7. Class 1 obesity without serious comorbidity with body mass index (BMI) of 30.0 to 30.9 in adult, unspecified obesity type  E66.9     Z68.30    8. Vaginal odor  N89.8 Ob/Gyn Referral           PLAN:   1. Syncope: Mrs. Lugo had a syncopal episode in July 2022 which brought her to the ED for evaluation. She did have prodromal symptoms of feeling hot and sweaty prior to syncopal episode. She hit her head on the bathroom counter when she had syncopal episode. She recalls waking on the floor and overall felt okay after syncopal episode- no headache, lightheaded, chest discomfort, dyspnea.  She denies any symptoms of chest pain or pressure, palpitations/fluttering/racing sensation in chest, dyspnea/dyspnea on exertion, lightheaded or dizziness prior to syncopal episode.  She has not had recurrent syncopal episode. Reassurance that leadless EKG monitor was normal. If recurrence of syncope could consier implantable loop recorder.     2. Reviewed leadless EKG monitor report from [facility] (official reading not available).  Patient had a minimum heart rate of 49 bpm, max heart rate of 129 bpm, and  average heart rate of 72 bpm.  Predominant underlying rhythm was sinus rhythm.  Isolated SVE's were rare(less than 1%).  SVE couplets were rare(less than 1%), and SVE triplets were rare (less than 1%).  Isolated VE's were rare (less than 1%), and no VE couplets or VE triplets were present.  She experienced no symptoms of racing, fluttering, palpitations, near-syncope, syncope, lightheaded, dizziness while wearing cardiac monitor.    3. Reviewed echocardiogram results which shows mild thickening of the basal septum, but no Doppler evidence of flow acceleration in the LVOT and no obvious HOWARD. of the mitral valve.  No significant structural abnormality that could be contributing to syncope.  Her function has normal with an EF of 65 to 70%. Plan for repeat echocardiogram in 1 year.     4. Concerned about ongoing vaginal odor. This is actually her main concern today. She would like referral to GYN provider to discuss this further.     5. Follow-up with cardiology in 1 year, certainly sooner with acute concerns.        Thank you for allowing me to participate in the care of your patient. Please do not hesitate to contact me if you have any questions.     Total time spent on day of visit, including review of tests, obtaining/reviewing separately obtained history, ordering medications/tests/procedures, communicating with PCP/consultants, and documenting in electronic medical record: 50 minutes.       Nova Augustine, CNP

## 2022-09-04 ENCOUNTER — HEALTH MAINTENANCE LETTER (OUTPATIENT)
Age: 72
End: 2022-09-04

## 2022-09-06 ENCOUNTER — OFFICE VISIT (OUTPATIENT)
Dept: OBGYN | Facility: OTHER | Age: 72
End: 2022-09-06
Attending: NURSE PRACTITIONER
Payer: MEDICARE

## 2022-09-06 VITALS
OXYGEN SATURATION: 96 % | WEIGHT: 184 LBS | DIASTOLIC BLOOD PRESSURE: 86 MMHG | BODY MASS INDEX: 30.66 KG/M2 | HEIGHT: 65 IN | HEART RATE: 87 BPM | SYSTOLIC BLOOD PRESSURE: 141 MMHG

## 2022-09-06 DIAGNOSIS — N89.8 VAGINAL ITCHING: ICD-10-CM

## 2022-09-06 DIAGNOSIS — N89.8 VAGINAL ODOR: Primary | ICD-10-CM

## 2022-09-06 LAB
CLUE CELLS: ABNORMAL
TRICHOMONAS, WET PREP: ABNORMAL
WBC'S/HIGH POWER FIELD, WET PREP: ABNORMAL
YEAST, WET PREP: ABNORMAL

## 2022-09-06 PROCEDURE — 87210 SMEAR WET MOUNT SALINE/INK: CPT | Mod: ZL | Performed by: NURSE PRACTITIONER

## 2022-09-06 PROCEDURE — G0463 HOSPITAL OUTPT CLINIC VISIT: HCPCS | Performed by: NURSE PRACTITIONER

## 2022-09-06 PROCEDURE — 99213 OFFICE O/P EST LOW 20 MIN: CPT | Performed by: NURSE PRACTITIONER

## 2022-09-06 ASSESSMENT — PAIN SCALES - GENERAL: PAINLEVEL: NO PAIN (0)

## 2022-09-06 NOTE — NURSING NOTE
"Chief Complaint   Patient presents with     Vaginal Problem       Initial BP (!) 141/86 (BP Location: Left arm, Patient Position: Sitting, Cuff Size: Adult Regular)   Pulse 87   Ht 1.651 m (5' 5\")   Wt 83.5 kg (184 lb)   SpO2 96%   BMI 30.62 kg/m   Estimated body mass index is 30.62 kg/m  as calculated from the following:    Height as of this encounter: 1.651 m (5' 5\").    Weight as of this encounter: 83.5 kg (184 lb).  Medication Reconciliation: complete     Mervat Colon LPN    "

## 2022-09-08 NOTE — PROGRESS NOTES
"[facility]                HPI   Kayla is here today with concerns of presumed recurrent vaginal yeast infections and vaginal odor.  She states symptoms began about a year and a half ago.  She has self treated and been treated orally for yeast infections many times and feels that maybe the symptoms resolve for a short period.  She has not been tested for yeast or vaginal infections and has no up to date pelvic exam.  She is not sexually active. She is diabetic and glucose has been in good control.  She uses all  Hypoallergenic products and no wipes or creams to her genital area.               Medications:     Current Outpatient Medications Ordered in Epic   Medication     atorvastatin (LIPITOR) 10 MG tablet     blood glucose (ACCU-CHEK SMARTVIEW) test strip     blood glucose calibration (ACCU-CHEK SMARTVIEW CONTROL) solution     blood glucose monitoring (ACCU-CHEK FASTCLIX) lancets     blood glucose monitoring (ACCU-CHEK DESTINI SMARTVIEW) meter device kit     Lactobacillus-Inulin (CULTURELLE DIGESTIVE DAILY) CAPS     levothyroxine (SYNTHROID/LEVOTHROID) 100 MCG tablet     metFORMIN (GLUCOPHAGE XR) 500 MG 24 hr tablet     polyethylene glycol (MIRALAX) 17 GM/Dose powder     vitamin D3 (CHOLECALCIFEROL) 1000 units (25 mcg) tablet     triamcinolone (KENALOG) 0.1 % ointment     No current Epic-ordered facility-administered medications on file.                Allergies:   Hydrochlorothiazide, Atenolol, and Lisinopril         Review of Systems:   The 5 point Review of Systems is negative other than noted in the HPI                     Physical Exam:   Blood pressure (!) 141/86, pulse 87, height 1.651 m (5' 5\"), weight 83.5 kg (184 lb), SpO2 96 %, not currently breastfeeding.  Constitutional:   awake, alert, cooperative, no apparent distress, and appears stated age     Genitounirinary:   External Genitalia:  Abnormal findings: atrophic.  Tissues pale and thinning.  No fissures or open lesions.  Darkening " noted right labia.    Vagina:  Discharge absent, Lesions absent  Cervix:  General appearance normal, Lesions absent, Discharge absent, Tenderness absent              Data:     Results for orders placed or performed in visit on 09/06/22   Wet prep     Status: Abnormal    Specimen: Vagina; Swab   Result Value Ref Range    Trichomonas Absent Absent    Yeast Absent Absent    Clue Cells Absent Absent    WBCs/high power field 1+ (A) None               Assessment and Plan:   Vaginal itching / odor - negative for infection.  Recommend vulvar biopsy of noted lesion.  Scheduled for next available.      Madeleine Ramirez NP, KEILA  9/8/2022  9:08 AM

## 2022-09-09 ENCOUNTER — OFFICE VISIT (OUTPATIENT)
Dept: OBGYN | Facility: OTHER | Age: 72
End: 2022-09-09
Attending: NURSE PRACTITIONER
Payer: MEDICARE

## 2022-09-09 VITALS
BODY MASS INDEX: 30.66 KG/M2 | WEIGHT: 184 LBS | DIASTOLIC BLOOD PRESSURE: 85 MMHG | SYSTOLIC BLOOD PRESSURE: 130 MMHG | HEART RATE: 82 BPM | OXYGEN SATURATION: 98 % | HEIGHT: 65 IN | RESPIRATION RATE: 15 BRPM

## 2022-09-09 DIAGNOSIS — N90.89 VULVAR IRRITATION: Primary | ICD-10-CM

## 2022-09-09 PROCEDURE — 88305 TISSUE EXAM BY PATHOLOGIST: CPT | Mod: TC | Performed by: NURSE PRACTITIONER

## 2022-09-09 PROCEDURE — G0463 HOSPITAL OUTPT CLINIC VISIT: HCPCS | Mod: 25

## 2022-09-09 PROCEDURE — 56605 BIOPSY OF VULVA/PERINEUM: CPT | Performed by: NURSE PRACTITIONER

## 2022-09-09 ASSESSMENT — PAIN SCALES - GENERAL: PAINLEVEL: NO PAIN (0)

## 2022-09-09 NOTE — NURSING NOTE
"Chief Complaint   Patient presents with     Procedure     Biopsy        Initial /85 (BP Location: Left arm, Patient Position: Sitting, Cuff Size: Adult Regular)   Pulse 82   Resp 15   Ht 1.651 m (5' 5\")   Wt 83.5 kg (184 lb)   SpO2 98%   BMI 30.62 kg/m   Estimated body mass index is 30.62 kg/m  as calculated from the following:    Height as of this encounter: 1.651 m (5' 5\").    Weight as of this encounter: 83.5 kg (184 lb).  Medication Reconciliation: complete  Sherin Roy RN    "

## 2022-09-12 NOTE — PROGRESS NOTES
"[facility]                HPI   Here today for vulvar biopsy.  See prior notes.              Medications:     Current Outpatient Medications Ordered in Epic   Medication     atorvastatin (LIPITOR) 10 MG tablet     blood glucose (ACCU-CHEK SMARTVIEW) test strip     blood glucose calibration (ACCU-CHEK SMARTVIEW CONTROL) solution     blood glucose monitoring (ACCU-CHEK FASTCLIX) lancets     blood glucose monitoring (ACCU-CHEK DESTINI SMARTVIEW) meter device kit     Lactobacillus-Inulin (CULTURELLE DIGESTIVE DAILY) CAPS     levothyroxine (SYNTHROID/LEVOTHROID) 100 MCG tablet     metFORMIN (GLUCOPHAGE XR) 500 MG 24 hr tablet     vitamin D3 (CHOLECALCIFEROL) 1000 units (25 mcg) tablet     polyethylene glycol (MIRALAX) 17 GM/Dose powder     triamcinolone (KENALOG) 0.1 % ointment     No current Epic-ordered facility-administered medications on file.                Allergies:   Hydrochlorothiazide, Atenolol, and Lisinopril         Review of Systems:   The 5 point Review of Systems is negative other than noted in the HPI                     Physical Exam:   Blood pressure 130/85, pulse 82, resp. rate 15, height 1.651 m (5' 5\"), weight 83.5 kg (184 lb), SpO2 98 %, not currently breastfeeding.  Constitutional:   awake, alert, cooperative, no apparent distress, and appears stated age     Genitounirinary:   External Genitalia:  Abnormal findings: variegated discoloration of left labia minora       Procedure:  After informed consent was obtained from the patient area was then swabbed with betadine prep and infiltrated with 1% lidocaine with epinephrine. A 3mm punch biopsy was used to completely excise the lesion. Pressure and silver nitrate were used for hemostasis.  Specimen was submitted to pathology. Patient tolerated the procedure well. EBL minimal.          Assessment and Plan:   Vulvar biopsy for vulvar irritation - will treat according to findings.  Pathology pending.     Madeleine Ramirez NP, " CFNP  9/12/2022  12:30 PM

## 2022-09-15 DIAGNOSIS — N90.4 LICHEN SCLEROSUS OF FEMALE GENITALIA: Primary | ICD-10-CM

## 2022-09-15 DIAGNOSIS — N90.4 LICHEN SCLEROSUS OF FEMALE GENITALIA: ICD-10-CM

## 2022-09-15 LAB
PATH REPORT.COMMENTS IMP SPEC: NORMAL
PATH REPORT.FINAL DX SPEC: NORMAL
PATH REPORT.GROSS SPEC: NORMAL
PATH REPORT.MICROSCOPIC SPEC OTHER STN: NORMAL
PATH REPORT.RELEVANT HX SPEC: NORMAL
PHOTO IMAGE: NORMAL

## 2022-09-15 PROCEDURE — 88305 TISSUE EXAM BY PATHOLOGIST: CPT | Mod: 26 | Performed by: PATHOLOGY

## 2022-09-15 RX ORDER — CLOBETASOL PROPIONATE 0.5 MG/G
OINTMENT TOPICAL 2 TIMES DAILY
Qty: 45 G | Refills: 1 | Status: SHIPPED | OUTPATIENT
Start: 2022-09-15 | End: 2022-09-15

## 2022-09-15 RX ORDER — CLOBETASOL PROPIONATE 0.5 MG/G
OINTMENT TOPICAL 2 TIMES DAILY
Qty: 45 G | Refills: 1 | Status: SHIPPED | OUTPATIENT
Start: 2022-09-15 | End: 2023-01-16

## 2022-09-19 DIAGNOSIS — E78.5 HYPERLIPIDEMIA, UNSPECIFIED HYPERLIPIDEMIA TYPE: ICD-10-CM

## 2022-09-20 RX ORDER — ATORVASTATIN CALCIUM 10 MG/1
TABLET, FILM COATED ORAL
Qty: 90 TABLET | Refills: 2 | Status: SHIPPED | OUTPATIENT
Start: 2022-09-20 | End: 2023-06-16

## 2022-09-29 ENCOUNTER — OFFICE VISIT (OUTPATIENT)
Dept: OBGYN | Facility: OTHER | Age: 72
End: 2022-09-29
Attending: NURSE PRACTITIONER
Payer: MEDICARE

## 2022-09-29 VITALS
HEART RATE: 90 BPM | BODY MASS INDEX: 30.66 KG/M2 | SYSTOLIC BLOOD PRESSURE: 125 MMHG | RESPIRATION RATE: 16 BRPM | OXYGEN SATURATION: 98 % | WEIGHT: 184 LBS | DIASTOLIC BLOOD PRESSURE: 75 MMHG | HEIGHT: 65 IN

## 2022-09-29 DIAGNOSIS — N90.4 LICHEN SCLEROSUS OF FEMALE GENITALIA: Primary | ICD-10-CM

## 2022-09-29 PROCEDURE — G0463 HOSPITAL OUTPT CLINIC VISIT: HCPCS

## 2022-09-29 PROCEDURE — 99213 OFFICE O/P EST LOW 20 MIN: CPT | Performed by: NURSE PRACTITIONER

## 2022-09-29 ASSESSMENT — PAIN SCALES - GENERAL: PAINLEVEL: NO PAIN (0)

## 2022-09-29 NOTE — NURSING NOTE
"Chief Complaint   Patient presents with     Follow Up     Linchen sclerosis        Initial /75 (BP Location: Left arm, Patient Position: Sitting, Cuff Size: Adult Regular)   Pulse 90   Resp 16   Ht 1.651 m (5' 5\")   Wt 83.5 kg (184 lb)   SpO2 98%   BMI 30.62 kg/m   Estimated body mass index is 30.62 kg/m  as calculated from the following:    Height as of this encounter: 1.651 m (5' 5\").    Weight as of this encounter: 83.5 kg (184 lb).  Medication Reconciliation: complete  Sherin Roy RN    "

## 2022-10-13 ENCOUNTER — HOSPITAL ENCOUNTER (EMERGENCY)
Facility: HOSPITAL | Age: 72
Discharge: HOME | End: 2022-10-13
Attending: PHYSICIAN ASSISTANT | Admitting: PHYSICIAN ASSISTANT
Payer: MEDICARE

## 2022-10-13 VITALS
OXYGEN SATURATION: 96 % | RESPIRATION RATE: 16 BRPM | SYSTOLIC BLOOD PRESSURE: 202 MMHG | TEMPERATURE: 98 F | DIASTOLIC BLOOD PRESSURE: 110 MMHG | HEART RATE: 79 BPM
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
## 2022-10-13 DIAGNOSIS — S76.911A MUSCLE STRAIN OF THIGH, RIGHT, INITIAL ENCOUNTER: ICD-10-CM

## 2022-10-13 DIAGNOSIS — M79.651 PAIN OF RIGHT THIGH: ICD-10-CM

## 2022-10-13 DIAGNOSIS — L08.9 TICK BITE OF RIGHT UPPER ARM WITH INFECTION, INITIAL ENCOUNTER: ICD-10-CM

## 2022-10-13 DIAGNOSIS — Z91.89 AT HIGH RISK FOR TICK BORNE ILLNESS: ICD-10-CM

## 2022-10-13 DIAGNOSIS — S40.861A TICK BITE OF RIGHT UPPER ARM WITH INFECTION, INITIAL ENCOUNTER: ICD-10-CM

## 2022-10-13 LAB
BASOPHILS # BLD AUTO: 0 10E3/UL (ref 0–0.2)
BASOPHILS NFR BLD AUTO: 0 %
D DIMER PPP FEU-MCNC: 0.37 UG/ML FEU (ref 0–0.5)
EOSINOPHIL # BLD AUTO: 0.1 10E3/UL (ref 0–0.7)
EOSINOPHIL NFR BLD AUTO: 2 %
ERYTHROCYTE [DISTWIDTH] IN BLOOD BY AUTOMATED COUNT: 13.8 % (ref 10–15)
HCT VFR BLD AUTO: 44 % (ref 35–47)
HGB BLD-MCNC: 14.8 G/DL (ref 11.7–15.7)
IMM GRANULOCYTES # BLD: 0 10E3/UL
IMM GRANULOCYTES NFR BLD: 0 %
LYMPHOCYTES # BLD AUTO: 3.5 10E3/UL (ref 0.8–5.3)
LYMPHOCYTES NFR BLD AUTO: 39 %
MCH RBC QN AUTO: 28.5 PG (ref 26.5–33)
MCHC RBC AUTO-ENTMCNC: 33.6 G/DL (ref 31.5–36.5)
MCV RBC AUTO: 85 FL (ref 78–100)
MONOCYTES # BLD AUTO: 0.6 10E3/UL (ref 0–1.3)
MONOCYTES NFR BLD AUTO: 6 %
NEUTROPHILS # BLD AUTO: 4.8 10E3/UL (ref 1.6–8.3)
NEUTROPHILS NFR BLD AUTO: 53 %
NRBC # BLD AUTO: 0 10E3/UL
NRBC BLD AUTO-RTO: 0 /100
PLATELET # BLD AUTO: 251 10E3/UL (ref 150–450)
RBC # BLD AUTO: 5.2 10E6/UL (ref 3.8–5.2)
WBC # BLD AUTO: 9.1 10E3/UL (ref 4–11)

## 2022-10-13 PROCEDURE — 85025 COMPLETE CBC W/AUTO DIFF WBC: CPT | Performed by: PHYSICIAN ASSISTANT

## 2022-10-13 PROCEDURE — 85379 FIBRIN DEGRADATION QUANT: CPT | Performed by: PHYSICIAN ASSISTANT

## 2022-10-13 PROCEDURE — 250N000013 HC RX MED GY IP 250 OP 250 PS 637: Performed by: PHYSICIAN ASSISTANT

## 2022-10-13 PROCEDURE — 87798 DETECT AGENT NOS DNA AMP: CPT | Mod: 59 | Performed by: PHYSICIAN ASSISTANT

## 2022-10-13 PROCEDURE — 87798 DETECT AGENT NOS DNA AMP: CPT | Performed by: PHYSICIAN ASSISTANT

## 2022-10-13 PROCEDURE — 99214 OFFICE O/P EST MOD 30 MIN: CPT | Performed by: PHYSICIAN ASSISTANT

## 2022-10-13 PROCEDURE — 36415 COLL VENOUS BLD VENIPUNCTURE: CPT | Performed by: PHYSICIAN ASSISTANT

## 2022-10-13 PROCEDURE — G0463 HOSPITAL OUTPT CLINIC VISIT: HCPCS

## 2022-10-13 PROCEDURE — 87476 LYME DIS DNA AMP PROBE: CPT | Performed by: PHYSICIAN ASSISTANT

## 2022-10-13 RX ORDER — DOXYCYCLINE 100 MG/1
200 CAPSULE ORAL ONCE
Status: COMPLETED | OUTPATIENT
Start: 2022-10-13 | End: 2022-10-13

## 2022-10-13 RX ORDER — DOXYCYCLINE 100 MG/1
100 CAPSULE ORAL 2 TIMES DAILY
Qty: 28 CAPSULE | Refills: 0 | Status: SHIPPED | OUTPATIENT
Start: 2022-10-13 | End: 2022-10-27

## 2022-10-13 RX ORDER — SACCHAROMYCES BOULARDII 250 MG
250 CAPSULE ORAL 2 TIMES DAILY
Qty: 28 CAPSULE | Refills: 0 | Status: SHIPPED | OUTPATIENT
Start: 2022-10-13 | End: 2022-10-27

## 2022-10-13 RX ORDER — MAGNESIUM CARB/ALUMINUM HYDROX 105-160MG
15 TABLET,CHEWABLE ORAL ONCE
Status: COMPLETED | OUTPATIENT
Start: 2022-10-13 | End: 2022-10-13

## 2022-10-13 RX ADMIN — DOXYCYCLINE HYCLATE 200 MG: 100 CAPSULE ORAL at 18:25

## 2022-10-13 RX ADMIN — MINERAL OIL 15 ML: 1 OIL ORAL at 16:36

## 2022-10-13 ASSESSMENT — ENCOUNTER SYMPTOMS
FACIAL ASYMMETRY: 0
FEVER: 0
CONFUSION: 0
SEIZURES: 0
TREMORS: 0
VOMITING: 0
FACIAL SWELLING: 0
SHORTNESS OF BREATH: 0
STRIDOR: 0

## 2022-10-13 ASSESSMENT — ACTIVITIES OF DAILY LIVING (ADL): ADLS_ACUITY_SCORE: 35

## 2022-10-13 NOTE — ED PROVIDER NOTES
"  History     Chief Complaint   Patient presents with     Leg Pain     HPI  Kayla Lugo is a 72 year old female who reports that her right lower leg has been having some pain off and on over the past couple of days.  She describes it as a pain that radiated up in her leg near the medial right thigh.  This feels like \"cramping and lasts 15 minutes\".  She reports that the pain sometimes wakes her up at night.  She reports some continued pain in her right medial thigh and she is concerned about a blood clot.  Otherwise incidentally the RN noted that the patient has a deer tick to her right posterior arm area with some localized erythema.    Allergies:  Allergies   Allergen Reactions     Hydrochlorothiazide Rash     Atenolol Other (See Comments)     Bradycardia      Lisinopril Cough       Problem List:    Patient Active Problem List    Diagnosis Date Noted     SCC (squamous cell carcinoma), arm, right 07/11/2022     Priority: Medium     Personal history of other malignant neoplasm of skin 03/16/2015     Priority: Medium     right upper lip BCC 2014       History of labyrinthitis 12/22/2014     Priority: Medium     Type 2 diabetes mellitus without complication, without long-term current use of insulin (H) 10/08/2014     Priority: Medium     Basal cell carcinoma of upper lip 09/03/2014     Priority: Medium     S/P excision of skin lesion, follow-up exam 09/03/2014     Priority: Medium     Skin lesion of face 08/28/2014     Priority: Medium     Advanced care planning/counseling discussion 10/02/2012     Priority: Medium     Esophageal reflux 06/09/2011     Priority: Medium     Eczema 06/09/2011     Priority: Medium     Personal history of colonic polyps 07/25/2006     Priority: Medium     Adenomatous polyp removed April 2004. Repeat colonoscopy in 2007, repeat again 2010       Adjustment disorder with depressed mood 11/09/2005     Priority: Medium     Personal history of other disorders of nervous system and sense " organs 11/09/2005     Priority: Medium     As young child, no recurrence       Dysmetabolic syndrome X 11/08/2004     Priority: Medium     Obesity, HTN       Family history of ischemic heart disease 11/08/2004     Priority: Medium     Father with MI and death at 49  CVD risk factors: Metabolic syndrome, HTN, obesity       Mixed hyperlipidemia 06/16/2004     Priority: Medium     low HDL, father with MI and death at 49       Obesity 06/16/2004     Priority: Medium     Photokeratitis 06/16/2004     Priority: Medium     Dr. Lizzie Cortez  Effudex, stay out of sun       Essential hypertension 03/27/2001     Priority: Medium     Osteoarthrosis, unspecified whether generalized or localized, involving lower leg 03/15/2001     Priority: Medium     Hypothyroidism 07/12/1999     Priority: Medium     Secondary to Hashimoto's thyroiditis.  Synthroid         Hearing loss 07/12/1999     Priority: Medium     Left ear          Past Medical History:    Past Medical History:   Diagnosis Date     Diabetes mellitus, type 2 (H) 10/22/2014     Eczema 06/09/2011     Esophageal reflux 06/09/2011     Helicobacter pylori (H. pylori) 06/09/2011     Osteoarthrosis, unspecified whether generalized or localized, lower leg 03/15/2001     Personal history of other endocrine, metabolic, and immunity disorders 06/09/2011     Unspecified acquired hypothyroidism 06/09/2011     Unspecified essential hypertension 03/27/2001     Unspecified hearing loss, left 06/09/2011       Past Surgical History:    Past Surgical History:   Procedure Laterality Date     ARTHROSCOPY KNEE  2001    RT      COLONOSCOPY  2004     COLONOSCOPY  06/13/2012    Dr. Gorman; 5 year recall given     COLONOSCOPY  07/11/2017     colonoscopy with polypectomy  2007    repeat in five      DILATION AND CURETTAGE  1996     EXCISE LESION LIP Right 9/16/2014    Procedure: EXCISE LESION LIP;  Surgeon: Bri Flores MD;  Location: [facility]     ORTHOPEDIC SURGERY Right 10-5-2015    right  shoulder arthoscopy RCR     NADIRA with BSO  1997       Family History:    Family History   Problem Relation Age of Onset     Alcohol/Drug Father         alcoholism     C.A.D. Father         (cause of death)      Cancer Mother         basal cell     Ovarian Cancer Mother      Alcohol/Drug Son         alcoholism      C.A.D. Son      Diabetes Brother      Diabetes Maternal Grandmother        Social History:  Marital Status:   [5]  Social History     Tobacco Use     Smoking status: Never     Smokeless tobacco: Never   Substance Use Topics     Alcohol use: No     Alcohol/week: 0.0 standard drinks     Drug use: No        Medications:    atorvastatin (LIPITOR) 10 MG tablet  blood glucose (ACCU-CHEK SMARTVIEW) test strip  blood glucose calibration (ACCU-CHEK SMARTVIEW CONTROL) solution  blood glucose monitoring (ACCU-CHEK FASTCLIX) lancets  blood glucose monitoring (ACCU-CHEK DESTINI SMARTVIEW) meter device kit  clobetasol (TEMOVATE) 0.05 % external ointment  Lactobacillus-Inulin (CULTURELLE DIGESTIVE DAILY) CAPS  levothyroxine (SYNTHROID/LEVOTHROID) 100 MCG tablet  metFORMIN (GLUCOPHAGE XR) 500 MG 24 hr tablet  polyethylene glycol (MIRALAX) 17 GM/Dose powder  triamcinolone (KENALOG) 0.1 % ointment  vitamin D3 (CHOLECALCIFEROL) 1000 units (25 mcg) tablet          Review of Systems   Constitutional: Negative for fever.   HENT: Negative for facial swelling.    Respiratory: Negative for shortness of breath and stridor.    Cardiovascular: Negative for chest pain.   Gastrointestinal: Negative for vomiting.   Musculoskeletal:        Right medial thigh pain.  Incidental tick noted to be embedded to her posterior right arm   Skin: Negative for pallor.   Neurological: Negative for tremors, seizures and facial asymmetry.   Psychiatric/Behavioral: Negative for confusion.   All other systems reviewed and are negative.      Physical Exam   BP: (!) 202/110 (Pt states white coat syndrome.)  Pulse: 79  Temp: 98  F (36.7  C)  Resp:  16  SpO2: 96 %    Vitals:    10/13/22 1541   BP: (!) 202/110   Pulse: 79   Resp: 16   Temp: 98  F (36.7  C)   TempSrc: Tympanic   SpO2: 96%       Physical Exam  Vitals and nursing note reviewed.   Constitutional:       General: She is not in acute distress.     Appearance: Normal appearance. She is not ill-appearing or toxic-appearing.   HENT:      Head: Normocephalic.      Nose: Nose normal.   Eyes:      General: No scleral icterus.     Extraocular Movements: Extraocular movements intact.   Neck:      Trachea: No tracheal deviation.   Cardiovascular:      Rate and Rhythm: Normal rate.   Pulmonary:      Effort: Pulmonary effort is normal. No respiratory distress.   Musculoskeletal:         General: Tenderness present. Normal range of motion.      Cervical back: Normal range of motion.      Comments: Right medial thigh pain.  Unable to palpate any mass.  No streaking.  No erythema.  Incidental tick noted to be embedded to her posterior right arm.  Mineral oil was applied.  She has some surrounding erythema.  Otherwise CMS x4   Skin:     General: Skin is warm and dry.      Coloration: Skin is not jaundiced or pale.   Neurological:      General: No focal deficit present.      Mental Status: She is alert and oriented to person, place, and time.   Psychiatric:         Attention and Perception: Attention normal.         Mood and Affect: Mood normal.         ED Course     Results for orders placed or performed during the hospital encounter of 10/13/22 (from the past 24 hour(s))   CBC with platelets differential    Narrative    The following orders were created for panel order CBC with platelets differential.  Procedure                               Abnormality         Status                     ---------                               -----------         ------                     CBC with platelets and d...[[id number]]                      Final result                 Please view results for these tests on the individual  orders.   D dimer quantitative   Result Value Ref Range    D-Dimer Quantitative 0.37 0.00 - 0.50 ug/mL FEU    Narrative    This D-dimer assay is intended for use in conjunction with a clinical pretest probability assessment model to exclude pulmonary embolism (PE) and deep venous thrombosis (DVT) in outpatients suspected of PE or DVT. The cut-off value is 0.50 ug/mL FEU.   CBC with platelets and differential   Result Value Ref Range    WBC Count 9.1 4.0 - 11.0 10e3/uL    RBC Count 5.20 3.80 - 5.20 10e6/uL    Hemoglobin 14.8 11.7 - 15.7 g/dL    Hematocrit 44.0 35.0 - 47.0 %    MCV 85 78 - 100 fL    MCH 28.5 26.5 - 33.0 pg    MCHC 33.6 31.5 - 36.5 g/dL    RDW 13.8 10.0 - 15.0 %    Platelet Count 251 150 - 450 10e3/uL    % Neutrophils 53 %    % Lymphocytes 39 %    % Monocytes 6 %    % Eosinophils 2 %    % Basophils 0 %    % Immature Granulocytes 0 %    NRBCs per 100 WBC 0 <1 /100    Absolute Neutrophils 4.8 1.6 - 8.3 10e3/uL    Absolute Lymphocytes 3.5 0.8 - 5.3 10e3/uL    Absolute Monocytes 0.6 0.0 - 1.3 10e3/uL    Absolute Eosinophils 0.1 0.0 - 0.7 10e3/uL    Absolute Basophils 0.0 0.0 - 0.2 10e3/uL    Absolute Immature Granulocytes 0.0 <=0.4 10e3/uL    Absolute NRBCs 0.0 10e3/uL       Medications   mineral oil oil 15 mL (15 mLs Oral Given 10/13/22 1636)   doxycycline hyclate (VIBRAMYCIN) capsule 200 mg (200 mg Oral Given 10/13/22 1825)       Assessments & Plan (with Medical Decision Making)     I have reviewed the nursing notes.    I have reviewed the findings, diagnosis, plan and need for follow up with the patient.      New Prescriptions    DOXYCYCLINE HYCLATE (VIBRAMYCIN) 100 MG CAPSULE    Take 1 capsule (100 mg) by mouth 2 times daily for 14 days    SACCHAROMYCES BOULARDII (FLORASTOR) 250 MG CAPSULE    Take 1 capsule (250 mg) by mouth 2 times daily for 14 days       Final diagnoses:   Pain of right thigh   Muscle strain of thigh, right, initial encounter   Tick bite of right upper arm with infection, initial  "encounter   At high risk for tick borne illness     Kayla Lugo is a 72 year old female who reports that her right lower leg has been having some pain off and on over the past couple of days.  She describes it as a pain that radiated up in her leg near the medial right thigh.  This feels like \"cramping and lasts 15 minutes\".  She reports that the pain sometimes wakes her up at night.  She reports some continued pain in her right medial thigh and she is concerned about a blood clot.  Otherwise incidentally the RN noted that the patient has a deer tick to her right posterior arm area with some localized erythema.  Afebrile.  Vital signs stable.  Physical exam shows  Right medial thigh pain.  Incidental tick noted to be embedded to her posterior right arm with surrounding erythema..  Mineral oil was applied to the tick on patient's arm.  The tick seemed backed out.  Tick was removed intact.  CBC shows normal white blood cells no left shift.  D-dimer is normal which is reassuring.  Tickborne tests are pending.  Given the amount of surrounding erythema the patient was given doxycycline 200 mg orally in the ER.  We will treat empirically at this time pending her lab results.  Rx for 14-day course of doxycycline as well as for Florastor.  She will use over-the-counter Motrin for pain relief in her right thigh.  Most likely this is a strain.  Follow-up with her primary care provider if symptoms persist for further evaluation as needed.  10/13/2022   [facility]     Leonel Hurt PA-C  10/13/22 1830    "

## 2022-10-13 NOTE — ED TRIAGE NOTES
Pt presents with right leg pain since Tuesday and the pain went up her leg to her medial right thigh that lasted 15 minutes. Today her leg cramps are in the same area and again go up to her right medial thigh. This time the pain does not go away.

## 2022-10-17 LAB
A PHAGOCYTOPH DNA BLD QL NAA+PROBE: NOT DETECTED
B MICROTI DNA BLD QL NAA+PROBE: NOT DETECTED
BABESIA DNA BLD QL NAA+PROBE: NOT DETECTED
E CHAFFEENSIS DNA BLD QL NAA+PROBE: NOT DETECTED
E EWINGII DNA SPEC QL NAA+PROBE: NOT DETECTED
EHRLICHIA DNA SPEC QL NAA+PROBE: NOT DETECTED

## 2022-10-20 LAB — B BURGDOR DNA SPEC QL NAA+PROBE: NOT DETECTED

## 2022-11-15 ENCOUNTER — TRANSFERRED RECORDS (OUTPATIENT)
Dept: HEALTH INFORMATION MANAGEMENT | Facility: CLINIC | Age: 72
End: 2022-11-15

## 2022-11-15 LAB — RETINOPATHY: NEGATIVE

## 2023-01-04 ENCOUNTER — OFFICE VISIT (OUTPATIENT)
Dept: DERMATOLOGY | Facility: OTHER | Age: 73
End: 2023-01-04
Attending: DERMATOLOGY
Payer: COMMERCIAL

## 2023-01-04 VITALS
HEIGHT: 65 IN | HEART RATE: 72 BPM | SYSTOLIC BLOOD PRESSURE: 152 MMHG | WEIGHT: 185 LBS | OXYGEN SATURATION: 98 % | BODY MASS INDEX: 30.82 KG/M2 | DIASTOLIC BLOOD PRESSURE: 86 MMHG

## 2023-01-04 DIAGNOSIS — L81.4 SOLAR LENTIGO: Primary | ICD-10-CM

## 2023-01-04 DIAGNOSIS — Z12.83 SCREENING FOR SKIN CANCER: ICD-10-CM

## 2023-01-04 DIAGNOSIS — D22.9 MULTIPLE BENIGN NEVI: ICD-10-CM

## 2023-01-04 PROCEDURE — 99213 OFFICE O/P EST LOW 20 MIN: CPT | Performed by: DERMATOLOGY

## 2023-01-04 PROCEDURE — G0463 HOSPITAL OUTPT CLINIC VISIT: HCPCS

## 2023-01-04 ASSESSMENT — PAIN SCALES - GENERAL: PAINLEVEL: NO PAIN (0)

## 2023-01-04 NOTE — LETTER
2023       RE: Soni Lugo  [address]  [place] [address]     Dear Colleague,    Thank you for referring your patient, Soni Lugo, to the [facility]. Please see a copy of my visit note below.    Visit Date: 2023    SUBJECTIVE:  Recheck visit for Alondra whom I have seen in the past.  She comes in today for a general skin check.  She did have a squamous cell carcinoma, a well-differentiated one that came on quickly and was removed by Dr. Mireles back in, I believe, October.  Site is well healed.  There is no evidence of any recurrence around that site.  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    OBJECTIVE:  On exam today, she shows some seborrheic keratoses.  No actinic keratoses.  We checked her face, her neck, her chest, her back, hands, arms, and found no lesions there of any concern.  Has quite a bit of lentigines and benign lesions, but nothing, especially on her face, that looks at all concerning.    PLAN:  Reassured.  Return again in 6-12 months.    JELANI Amezquita MD        D: 2023   T: 2023   MT: [facility]    Name:     SONI LUGO  MRN:      [id number]        Account:    [id number]   :      1950           Visit Date: 2023     Document: [id number]      Again, thank you for allowing me to participate in the care of your patient.      Sincerely,    JELANI Amezquita MD

## 2023-01-04 NOTE — PROGRESS NOTES
Visit Date: 2023    SUBJECTIVE:  Recheck visit for Alondra whom I have seen in the past.  She comes in today for a general skin check.  She did have a squamous cell carcinoma, a well-differentiated one that came on quickly and was removed by Dr. Mireles back in, I believe, October.  Site is well healed.  There is no evidence of any recurrence around that site.  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    OBJECTIVE:  On exam today, she shows some seborrheic keratoses.  No actinic keratoses.  We checked her face, her neck, her chest, her back, hands, arms, and found no lesions there of any concern.  Has quite a bit of lentigines and benign lesions, but nothing, especially on her face, that looks at all concerning.    PLAN:  Reassured.  Return again in 6-12 months.    JELANI Amezquita MD        D: 2023   T: 2023   MT: [facility]    Name:     SONI LOCKWOOD  MRN:      [id number]        Account:    [id number]   :      1950           Visit Date: 2023     Document: [id number]

## 2023-01-15 ENCOUNTER — HEALTH MAINTENANCE LETTER (OUTPATIENT)
Age: 73
End: 2023-01-15

## 2023-01-16 ENCOUNTER — OFFICE VISIT (OUTPATIENT)
Dept: FAMILY MEDICINE | Facility: OTHER | Age: 73
End: 2023-01-16
Attending: FAMILY MEDICINE
Payer: COMMERCIAL

## 2023-01-16 VITALS
OXYGEN SATURATION: 97 % | WEIGHT: 185 LBS | TEMPERATURE: 98 F | BODY MASS INDEX: 30.82 KG/M2 | RESPIRATION RATE: 16 BRPM | HEART RATE: 74 BPM | DIASTOLIC BLOOD PRESSURE: 86 MMHG | SYSTOLIC BLOOD PRESSURE: 132 MMHG | HEIGHT: 65 IN

## 2023-01-16 DIAGNOSIS — F43.21 ADJUSTMENT DISORDER WITH DEPRESSED MOOD: ICD-10-CM

## 2023-01-16 DIAGNOSIS — Z12.11 SCREEN FOR COLON CANCER: ICD-10-CM

## 2023-01-16 DIAGNOSIS — E78.2 MIXED HYPERLIPIDEMIA: ICD-10-CM

## 2023-01-16 DIAGNOSIS — E03.9 HYPOTHYROIDISM, UNSPECIFIED TYPE: ICD-10-CM

## 2023-01-16 DIAGNOSIS — E11.9 TYPE 2 DIABETES MELLITUS WITHOUT COMPLICATION, WITHOUT LONG-TERM CURRENT USE OF INSULIN (H): Primary | ICD-10-CM

## 2023-01-16 DIAGNOSIS — H91.93 DECREASED HEARING OF BOTH EARS: Primary | ICD-10-CM

## 2023-01-16 DIAGNOSIS — I10 ESSENTIAL HYPERTENSION: ICD-10-CM

## 2023-01-16 DIAGNOSIS — Z12.31 ENCOUNTER FOR SCREENING MAMMOGRAM FOR BREAST CANCER: ICD-10-CM

## 2023-01-16 LAB
ALT SERPL W P-5'-P-CCNC: 16 U/L (ref 10–35)
ANION GAP SERPL CALCULATED.3IONS-SCNC: 10 MMOL/L (ref 7–15)
BUN SERPL-MCNC: 17 MG/DL (ref 8–23)
CALCIUM SERPL-MCNC: 9.9 MG/DL (ref 8.8–10.2)
CHLORIDE SERPL-SCNC: 103 MMOL/L (ref 98–107)
CHOLEST SERPL-MCNC: 137 MG/DL
CREAT SERPL-MCNC: 0.77 MG/DL (ref 0.51–0.95)
CREAT UR-MCNC: 32.4 MG/DL
DEPRECATED HCO3 PLAS-SCNC: 26 MMOL/L (ref 22–29)
EST. AVERAGE GLUCOSE BLD GHB EST-MCNC: 131 MG/DL
GFR SERPL CREATININE-BSD FRML MDRD: 82 ML/MIN/1.73M2
GLUCOSE SERPL-MCNC: 106 MG/DL (ref 70–99)
HBA1C MFR BLD: 6.2 %
HDLC SERPL-MCNC: 40 MG/DL
LDLC SERPL CALC-MCNC: 63 MG/DL
MICROALBUMIN UR-MCNC: <12 MG/L
MICROALBUMIN/CREAT UR: NORMAL MG/G{CREAT}
NONHDLC SERPL-MCNC: 97 MG/DL
POTASSIUM SERPL-SCNC: 4.4 MMOL/L (ref 3.4–5.3)
SODIUM SERPL-SCNC: 139 MMOL/L (ref 136–145)
T4 FREE SERPL-MCNC: 1.67 NG/DL (ref 0.9–1.7)
TRIGL SERPL-MCNC: 171 MG/DL
TSH SERPL DL<=0.005 MIU/L-ACNC: 0.28 UIU/ML (ref 0.3–4.2)

## 2023-01-16 PROCEDURE — 84443 ASSAY THYROID STIM HORMONE: CPT | Mod: ZL | Performed by: FAMILY MEDICINE

## 2023-01-16 PROCEDURE — 82570 ASSAY OF URINE CREATININE: CPT | Mod: ZL | Performed by: FAMILY MEDICINE

## 2023-01-16 PROCEDURE — 99214 OFFICE O/P EST MOD 30 MIN: CPT | Performed by: FAMILY MEDICINE

## 2023-01-16 PROCEDURE — 80048 BASIC METABOLIC PNL TOTAL CA: CPT | Mod: ZL | Performed by: FAMILY MEDICINE

## 2023-01-16 PROCEDURE — 80061 LIPID PANEL: CPT | Mod: ZL | Performed by: FAMILY MEDICINE

## 2023-01-16 PROCEDURE — 84439 ASSAY OF FREE THYROXINE: CPT | Mod: ZL | Performed by: FAMILY MEDICINE

## 2023-01-16 PROCEDURE — 36415 COLL VENOUS BLD VENIPUNCTURE: CPT | Mod: ZL | Performed by: FAMILY MEDICINE

## 2023-01-16 PROCEDURE — G0463 HOSPITAL OUTPT CLINIC VISIT: HCPCS

## 2023-01-16 PROCEDURE — 84460 ALANINE AMINO (ALT) (SGPT): CPT | Mod: ZL | Performed by: FAMILY MEDICINE

## 2023-01-16 PROCEDURE — 83036 HEMOGLOBIN GLYCOSYLATED A1C: CPT | Mod: ZL | Performed by: FAMILY MEDICINE

## 2023-01-16 ASSESSMENT — PAIN SCALES - GENERAL: PAINLEVEL: NO PAIN (0)

## 2023-01-16 NOTE — PROGRESS NOTES
"  Assessment & Plan     1. Type 2 diabetes mellitus without complication, without long-term current use of insulin (H)  Labs updated, will refill medication when due.  Follow-up in six months, sooner as needed.  - Albumin Random Urine Quantitative with Creat Ratio; Future  - Hemoglobin A1c; Future  - TSH with free T4 reflex; Future  - Hemoglobin A1c  - TSH with free T4 reflex  - Albumin Random Urine Quantitative with Creat Ratio    2. Mixed hyperlipidemia  As above.  - ALT; Future  - Lipid Profile; Future  - ALT  - Lipid Profile    3. Essential hypertension  As above.  - Basic metabolic panel; Future  - Basic metabolic panel    4. Adjustment disorder with depressed mood  No changes    5. Hypothyroidism, unspecified type  Labs updated    6. Encounter for screening mammogram for breast cancer  Ordered  - MA Screening Bilateral w/ Wilson; Future    7. Screen for colon cancer  Ordered referral for Dr. Denney.  - Colonoscopy Screening  Referral; Future        BMI:   Estimated body mass index is 30.79 kg/m  as calculated from the following:    Height as of this encounter: 1.651 m (5' 5\").    Weight as of this encounter: 83.9 kg (185 lb).     Return in about 6 months (around 7/16/2023) for Diabetic Follow-up, Chronic Disease Management, Medication review.    Natalia Starr MD  [facility] [place] DESHAUN Garza is a 72 year old presenting for the following health issues:  Diabetes, Lipids, and Hypertension      HPI     Diabetes Follow-up    How often are you checking your blood sugar? A few times a week  What time of day are you checking your blood sugars (select all that apply)?  Before and after meals  Have you had any blood sugars above 200?  No  Have you had any blood sugars below 70?  No    What symptoms do you notice when your blood sugar is low?  None    What concerns do you have today about your diabetes? None     Do you have any of these symptoms? (Select all that apply)  No " "numbness or tingling in feet.  No redness, sores or blisters on feet.  No complaints of excessive thirst.  No reports of blurry vision.  No significant changes to weight.      BP Readings from Last 2 Encounters:   01/16/23 132/86   01/04/23 (!) 152/86     Hemoglobin A1C (%)   Date Value   07/11/2022 6.1 (H)   01/10/2022 6.1 (H)   06/29/2021 5.8 (H)   12/01/2020 5.8 (H)     LDL Cholesterol Calculated (mg/dL)   Date Value   07/11/2022 65   01/10/2022 78   06/29/2021 71   12/01/2020 71         Hyperlipidemia Follow-Up      Are you regularly taking any medication or supplement to lower your cholesterol?   Yes- lipitor    Are you having muscle aches or other side effects that you think could be caused by your cholesterol lowering medication?  No    Hypertension Follow-up      Do you check your blood pressure regularly outside of the clinic? No     Are you following a low salt diet? Yes    Are your blood pressures ever more than 140 on the top number (systolic) OR more   than 90 on the bottom number (diastolic), for example 140/90? No      Review of Systems   Constitutional, HEENT, cardiovascular, pulmonary, gi and gu systems are negative, except as otherwise noted.      Objective    /86 (BP Location: Right arm, Patient Position: Sitting, Cuff Size: Adult Regular)   Pulse 74   Temp 98  F (36.7  C) (Tympanic)   Resp 16   Ht 1.651 m (5' 5\")   Wt 83.9 kg (185 lb)   SpO2 97%   BMI 30.79 kg/m    Body mass index is 30.79 kg/m .  Physical Exam   GENERAL: healthy, alert and no distress  PSYCH: mentation appears normal, affect normal/bright                "

## 2023-01-17 ENCOUNTER — ANCILLARY PROCEDURE (OUTPATIENT)
Dept: MAMMOGRAPHY | Facility: OTHER | Age: 73
End: 2023-01-17
Attending: FAMILY MEDICINE
Payer: COMMERCIAL

## 2023-01-17 DIAGNOSIS — Z12.31 ENCOUNTER FOR SCREENING MAMMOGRAM FOR BREAST CANCER: ICD-10-CM

## 2023-01-17 PROCEDURE — 77067 SCR MAMMO BI INCL CAD: CPT | Mod: TC

## 2023-01-23 ENCOUNTER — OFFICE VISIT (OUTPATIENT)
Dept: AUDIOLOGY | Facility: OTHER | Age: 73
End: 2023-01-23
Attending: AUDIOLOGIST
Payer: COMMERCIAL

## 2023-01-23 DIAGNOSIS — H91.93 DECREASED HEARING OF BOTH EARS: ICD-10-CM

## 2023-01-23 DIAGNOSIS — H90.42 SENSORINEURAL HEARING LOSS (SNHL) OF LEFT EAR WITH UNRESTRICTED HEARING OF RIGHT EAR: Primary | ICD-10-CM

## 2023-01-23 PROCEDURE — 92557 COMPREHENSIVE HEARING TEST: CPT | Performed by: AUDIOLOGIST

## 2023-01-23 PROCEDURE — 92567 TYMPANOMETRY: CPT | Performed by: AUDIOLOGIST

## 2023-01-23 NOTE — PROGRESS NOTES
Audiology Evaluation Completed. Please refer SCANNED AUDIOGRAM and/or TYMPANOGRAM for BACKGROUND, RESULTS, RECOMMENDATIONS.      Kylie CHAUDHARY, [facility]  Audiologist #[id number]

## 2023-01-24 ENCOUNTER — OFFICE VISIT (OUTPATIENT)
Dept: CARE COORDINATION | Facility: OTHER | Age: 73
End: 2023-01-24
Attending: INTERNAL MEDICINE
Payer: COMMERCIAL

## 2023-01-24 VITALS
BODY MASS INDEX: 30.82 KG/M2 | OXYGEN SATURATION: 98 % | WEIGHT: 185 LBS | SYSTOLIC BLOOD PRESSURE: 130 MMHG | HEIGHT: 65 IN | DIASTOLIC BLOOD PRESSURE: 78 MMHG | RESPIRATION RATE: 14 BRPM | HEART RATE: 77 BPM

## 2023-01-24 DIAGNOSIS — Z86.0100 HISTORY OF COLONIC POLYPS: ICD-10-CM

## 2023-01-24 DIAGNOSIS — Z12.11 COLON CANCER SCREENING: Primary | ICD-10-CM

## 2023-01-24 PROCEDURE — G0463 HOSPITAL OUTPT CLINIC VISIT: HCPCS

## 2023-01-24 PROCEDURE — G0463 HOSPITAL OUTPT CLINIC VISIT: HCPCS | Mod: 25

## 2023-01-24 ASSESSMENT — PAIN SCALES - GENERAL: PAINLEVEL: NO PAIN (0)

## 2023-01-24 NOTE — PROGRESS NOTES
Gastroenterlogy Consult      CC/REFERRING MD:  No ref. provider found    Chief Complaint: Concern regarding colon cancer screening and future colonoscopy      Past Medical History:  Past Medical History:   Diagnosis Date     Diabetes mellitus, type 2 (H) 10/22/2014     Eczema 06/09/2011     Esophageal reflux 06/09/2011     Helicobacter pylori (H. pylori) 06/09/2011    history of      Osteoarthrosis, unspecified whether generalized or localized, lower leg 03/15/2001    Yuval Rapp MD      Personal history of other endocrine, metabolic, and immunity disorders 06/09/2011     Unspecified acquired hypothyroidism 06/09/2011    Natalia Starr MD     Unspecified essential hypertension 03/27/2001    Geremias Rouse MD      Unspecified hearing loss, left 06/09/2011         HPI:  Alondra is a pleasant 72-year-old she brought in records of her previous colonoscopies her last was 5 and half years ago performed by  at [facility].  Apparently there was significant pain during the procedure for the patient and she was awake through part of it which concerns her with respect to her upcoming exam in April of this year.  Reading the op note the gastroenterologist describes difficulty passing the scope due to a so-called restricted colon the patient's body habitus and ineffective sedation.  She has had polyps on multiple colonoscopies but none on the last exam appropriately she is scheduled for 5-year follow-up although she is a bit late at this point she is having no GI symptoms such as change in bowel habits gross bleeding diarrhea or constipation or abdominal pain we discussed her upcoming exam on her last she received 6 mg of Versed and 200 mcg of fentanyl we will use propofol for her next exam and I suspect I will have less difficulty passing the scope then Dr. Marcus did she seemed reassured    PREVIOUS SURGERIES:  Past Surgical History:   Procedure Laterality Date     ARTHROSCOPY KNEE  2001    RT      COLONOSCOPY   2004     COLONOSCOPY  06/13/2012    Dr. Gorman; 5 year recall given     COLONOSCOPY  07/11/2017     colonoscopy with polypectomy  2007    repeat in five      DILATION AND CURETTAGE  1996     EXCISE LESION LIP Right 9/16/2014    Procedure: EXCISE LESION LIP;  Surgeon: Bri Flores MD;  Location: [facility]     ORTHOPEDIC SURGERY Right 10-5-2015    right shoulder arthoscopy RCR     NADIRA with BSO  1997       MEDICATIONS:    Current Outpatient Medications:      atorvastatin (LIPITOR) 10 MG tablet, TAKE 1 TABLET(10 MG) BY MOUTH DAILY, Disp: 90 tablet, Rfl: 2     blood glucose (ACCU-CHEK SMARTVIEW) test strip, Use to test blood sugar one time daily., Disp: 100 strip, Rfl: 3     blood glucose calibration (ACCU-CHEK SMARTVIEW CONTROL) solution, Use to calibrate blood glucose monitor as directed., Disp: 1 Bottle, Rfl: 3     blood glucose monitoring (ACCU-CHEK FASTCLIX) lancets, USE TO TEST BLOOD SUGARS ONCE DAILY, Disp: 102 each, Rfl: 3     blood glucose monitoring (ACCU-CHEK DESTINI SMARTVIEW) meter device kit, Use to test blood sugar one time daily., Disp: 1 kit, Rfl: 0     Lactobacillus-Inulin (CULTURELLE DIGESTIVE DAILY) CAPS, Take 1 capsule by mouth daily, Disp: , Rfl:      levothyroxine (SYNTHROID/LEVOTHROID) 100 MCG tablet, TAKE 1 TABLET(100 MCG) BY MOUTH DAILY, Disp: 90 tablet, Rfl: 1     metFORMIN (GLUCOPHAGE XR) 500 MG 24 hr tablet, TAKE 2 TABLETS(1000 MG) BY MOUTH AT BEDTIME, Disp: 180 tablet, Rfl: 1     vitamin D3 (CHOLECALCIFEROL) 1000 units (25 mcg) tablet, Take 4 tablets by mouth daily., Disp: , Rfl:     ALLERGIES:     Allergies   Allergen Reactions     Hydrochlorothiazide Rash     Atenolol Other (See Comments)     Bradycardia      Lisinopril Cough       FAMILY HISTORY:  Family History   Problem Relation Age of Onset     Alcohol/Drug Father         alcoholism     C.A.D. Father         (cause of death)      Cancer Mother         basal cell     Ovarian Cancer Mother      Alcohol/Drug Son         alcoholism   "    NYA Son      Diabetes Brother      Diabetes Maternal Grandmother        SOCIAL HISTORY:  Social History     Socioeconomic History     Marital status:      Spouse name: Not on file     Number of children: Not on file     Years of education: Not on file     Highest education level: Not on file   Occupational History     Not on file   Tobacco Use     Smoking status: Never     Smokeless tobacco: Never   Substance and Sexual Activity     Alcohol use: No     Alcohol/week: 0.0 standard drinks     Drug use: No     Sexual activity: Not Currently   Other Topics Concern      Service Not Asked     Blood Transfusions Yes     Caffeine Concern Yes     Comment: Coffee 3 cups daily      Occupational Exposure Not Asked     Hobby Hazards Not Asked     Sleep Concern Not Asked     Stress Concern Not Asked     Weight Concern Not Asked     Special Diet Not Asked     Back Care Not Asked     Exercise Yes     Comment: Walking      Bike Helmet Not Asked     Seat Belt Not Asked     Self-Exams Not Asked     Parent/sibling w/ CABG, MI or angioplasty before 65F 55M? Not Asked   Social History Narrative     Not on file     Social Determinants of Health     Financial Resource Strain: Not on file   Food Insecurity: Not on file   Transportation Needs: Not on file   Physical Activity: Not on file   Stress: Not on file   Social Connections: Not on file   Intimate Partner Violence: Not on file   Housing Stability: Not on file         PHYSICAL EXAM:  Constitutional: aaox3, cooperative, pleasant, not dyspneic/diaphoretic, no acute distress  Vitals reviewed: /78 (BP Location: Left arm, Cuff Size: Adult Large)   Pulse 77   Resp 14   Ht 1.651 m (5' 5\")   Wt 83.9 kg (185 lb)   SpO2 98%   BMI 30.79 kg/m    Wt:   Wt Readings from Last 2 Encounters:   01/24/23 83.9 kg (185 lb)   01/16/23 83.9 kg (185 lb)      Eyes: Sclera anicteric/injected  Ears/nose/mouth/throat: Normal oropharynx without ulcers or exudate, mucus membranes " moist, hearing intact  Neck: supple, thyroid normal size  CV: No edema  Respiratory: Unlabored breathing  Lymph: No axillary, submandibular, supraclavicular or inguinal lymphadenopathy  Abd: Soft nondistended, +bs, no hepatosplenomegaly, nontender, no peritoneal signs  Skin: warm, perfused, no jaundice  Psych: Normal affect  MSK: Normal gait      ASSESSMENT/PLAN:  History of colon polyps colonoscopy scheduled for April we will use propofol for sedation she should do fine      Yuval Arevalo MD

## 2023-02-17 DIAGNOSIS — E03.9 HYPOTHYROIDISM, UNSPECIFIED TYPE: ICD-10-CM

## 2023-02-17 DIAGNOSIS — E11.9 TYPE 2 DIABETES MELLITUS WITHOUT COMPLICATION, WITHOUT LONG-TERM CURRENT USE OF INSULIN (H): ICD-10-CM

## 2023-02-21 RX ORDER — LEVOTHYROXINE SODIUM 100 UG/1
TABLET ORAL
Qty: 90 TABLET | Refills: 1 | Status: SHIPPED | OUTPATIENT
Start: 2023-02-21 | End: 2023-07-28

## 2023-02-21 RX ORDER — METFORMIN HCL 500 MG
TABLET, EXTENDED RELEASE 24 HR ORAL
Qty: 180 TABLET | Refills: 1 | Status: SHIPPED | OUTPATIENT
Start: 2023-02-21 | End: 2023-08-14

## 2023-02-21 NOTE — TELEPHONE ENCOUNTER
Synthroid      Last Written Prescription Date:  11.18.22  Last Fill Quantity: #90,   # refills: 0  Last Office Visit: 1.16.23  Future Office visit:       Routing refill request to provider for review/approval because:

## 2023-04-06 ENCOUNTER — TRANSFERRED RECORDS (OUTPATIENT)
Dept: MULTI SPECIALTY CLINIC | Facility: CLINIC | Age: 73
End: 2023-04-06

## 2023-06-03 ENCOUNTER — HEALTH MAINTENANCE LETTER (OUTPATIENT)
Age: 73
End: 2023-06-03

## 2023-06-16 DIAGNOSIS — E78.5 HYPERLIPIDEMIA, UNSPECIFIED HYPERLIPIDEMIA TYPE: ICD-10-CM

## 2023-06-16 RX ORDER — ATORVASTATIN CALCIUM 10 MG/1
TABLET, FILM COATED ORAL
Qty: 90 TABLET | Refills: 1 | Status: SHIPPED | OUTPATIENT
Start: 2023-06-16 | End: 2023-12-12

## 2023-06-16 NOTE — TELEPHONE ENCOUNTER
atorvastatin (LIPITOR) 10 MG tablet      Last Written Prescription Date:  6/9/20  Last Fill Quantity: 90,   # refills: 2  Last Office Visit: 6/1/23  Future Office visit:

## 2023-07-17 ENCOUNTER — OFFICE VISIT (OUTPATIENT)
Dept: FAMILY MEDICINE | Facility: OTHER | Age: 73
End: 2023-07-17
Attending: FAMILY MEDICINE
Payer: COMMERCIAL

## 2023-07-17 VITALS
DIASTOLIC BLOOD PRESSURE: 92 MMHG | OXYGEN SATURATION: 99 % | HEIGHT: 65 IN | BODY MASS INDEX: 31.65 KG/M2 | WEIGHT: 190 LBS | SYSTOLIC BLOOD PRESSURE: 150 MMHG | TEMPERATURE: 96.9 F | HEART RATE: 70 BPM | RESPIRATION RATE: 16 BRPM

## 2023-07-17 DIAGNOSIS — I10 ESSENTIAL HYPERTENSION: ICD-10-CM

## 2023-07-17 DIAGNOSIS — E11.9 TYPE 2 DIABETES MELLITUS WITHOUT COMPLICATION, WITHOUT LONG-TERM CURRENT USE OF INSULIN (H): Primary | ICD-10-CM

## 2023-07-17 DIAGNOSIS — R25.2 MUSCLE CRAMPS: ICD-10-CM

## 2023-07-17 DIAGNOSIS — E78.2 MIXED HYPERLIPIDEMIA: ICD-10-CM

## 2023-07-17 DIAGNOSIS — E03.9 HYPOTHYROIDISM, UNSPECIFIED TYPE: ICD-10-CM

## 2023-07-17 PROCEDURE — 83036 HEMOGLOBIN GLYCOSYLATED A1C: CPT | Mod: ZL | Performed by: FAMILY MEDICINE

## 2023-07-17 PROCEDURE — 83735 ASSAY OF MAGNESIUM: CPT | Mod: ZL | Performed by: FAMILY MEDICINE

## 2023-07-17 PROCEDURE — 84460 ALANINE AMINO (ALT) (SGPT): CPT | Mod: ZL | Performed by: FAMILY MEDICINE

## 2023-07-17 PROCEDURE — G0463 HOSPITAL OUTPT CLINIC VISIT: HCPCS

## 2023-07-17 PROCEDURE — 99214 OFFICE O/P EST MOD 30 MIN: CPT | Performed by: FAMILY MEDICINE

## 2023-07-17 PROCEDURE — 36415 COLL VENOUS BLD VENIPUNCTURE: CPT | Mod: ZL | Performed by: FAMILY MEDICINE

## 2023-07-17 PROCEDURE — 80061 LIPID PANEL: CPT | Mod: ZL | Performed by: FAMILY MEDICINE

## 2023-07-17 PROCEDURE — 82310 ASSAY OF CALCIUM: CPT | Mod: ZL | Performed by: FAMILY MEDICINE

## 2023-07-17 ASSESSMENT — PAIN SCALES - GENERAL: PAINLEVEL: NO PAIN (0)

## 2023-07-17 NOTE — PROGRESS NOTES
"  Assessment & Plan     1. Type 2 diabetes mellitus without complication, without long-term current use of insulin (H)  Labs updated, will make adjustments as needed.  Will refill medication when due.  Follow-up in six months, sooner as needed.  - Hemoglobin A1c; Future  - Hemoglobin A1c    2. Mixed hyperlipidemia  As above.  - Lipid Profile (Chol, Trig, HDL, LDL calc); Future  - ALT; Future  - Lipid Profile (Chol, Trig, HDL, LDL calc)  - ALT    3. Essential hypertension  As above.  - Basic metabolic panel; Future  - Basic metabolic panel    4. Hypothyroidism, unspecified type  No changes    5. Muscle cramps  Will check mag level, other OTC treatments discussed.  - Magnesium; Future  - Magnesium          BMI:   Estimated body mass index is 31.62 kg/m  as calculated from the following:    Height as of this encounter: 1.651 m (5' 5\").    Weight as of this encounter: 86.2 kg (190 lb).     Return in about 6 months (around 1/17/2024) for Diabetic Follow-up, Chronic Disease Management, Medication review.    Natalia Starr MD  [facility] - [place] DESHAUN Garza is a 73 year old, presenting for the following health issues:  Diabetes, Lipids, Thyroid Problem, and Hypertension      HPI     Diabetes Follow-up    How often are you checking your blood sugar? A few times a month  What time of day are you checking your blood sugars (select all that apply)?  Before and after meals  Have you had any blood sugars above 200?  No  Have you had any blood sugars below 70?  No    What symptoms do you notice when your blood sugar is low?  None    What concerns do you have today about your diabetes? None     Do you have any of these symptoms? (Select all that apply)  No numbness or tingling in feet.  No redness, sores or blisters on feet.  No complaints of excessive thirst.  No reports of blurry vision.  No significant changes to weight.      BP Readings from Last 2 Encounters:   07/17/23 (!) 150/92   01/24/23 " "130/78     Hemoglobin A1C (%)   Date Value   07/17/2023 6.4 (H)   01/16/2023 6.2 (H)   06/29/2021 5.8 (H)   12/01/2020 5.8 (H)     LDL Cholesterol Calculated (mg/dL)   Date Value   07/17/2023 62   01/16/2023 63   06/29/2021 71   12/01/2020 71       Hyperlipidemia Follow-Up      Are you regularly taking any medication or supplement to lower your cholesterol?   Yes- lipitor    Are you having muscle aches or other side effects that you think could be caused by your cholesterol lowering medication?  No    Hypertension Follow-up      Do you check your blood pressure regularly outside of the clinic? Yes     Are you following a low salt diet? Yes    Are your blood pressures ever more than 140 on the top number (systolic) OR more   than 90 on the bottom number (diastolic), for example 140/90? No    Hypothyroidism Follow-up      Since last visit, patient describes the following symptoms: Weight stable, no hair loss, no skin changes, no constipation, no loose stools      Patient is having muscle cramping at nighttime.  She wonders if there is anything she can do to help with this.        Review of Systems   Constitutional, HEENT, cardiovascular, pulmonary, gi and gu systems are negative, except as otherwise noted.      Objective    BP (!) 150/92 (BP Location: Right arm, Patient Position: Sitting, Cuff Size: Adult Regular)   Pulse 70   Temp 96.9  F (36.1  C) (Tympanic)   Resp 16   Ht 1.651 m (5' 5\")   Wt 86.2 kg (190 lb)   SpO2 99%   BMI 31.62 kg/m    Body mass index is 31.62 kg/m .  Physical Exam   GENERAL: healthy, alert and no distress  PSYCH: mentation appears normal, affect normal/bright                "

## 2023-07-18 LAB
ALT SERPL W P-5'-P-CCNC: 19 U/L (ref 0–50)
ANION GAP SERPL CALCULATED.3IONS-SCNC: 14 MMOL/L (ref 7–15)
BUN SERPL-MCNC: 11.6 MG/DL (ref 8–23)
CALCIUM SERPL-MCNC: 10.3 MG/DL (ref 8.8–10.2)
CHLORIDE SERPL-SCNC: 102 MMOL/L (ref 98–107)
CHOLEST SERPL-MCNC: 133 MG/DL
CREAT SERPL-MCNC: 0.78 MG/DL (ref 0.51–0.95)
DEPRECATED HCO3 PLAS-SCNC: 25 MMOL/L (ref 22–29)
EST. AVERAGE GLUCOSE BLD GHB EST-MCNC: 137 MG/DL
GFR SERPL CREATININE-BSD FRML MDRD: 80 ML/MIN/1.73M2
GLUCOSE SERPL-MCNC: 99 MG/DL (ref 70–99)
HBA1C MFR BLD: 6.4 %
HDLC SERPL-MCNC: 39 MG/DL
LDLC SERPL CALC-MCNC: 62 MG/DL
MAGNESIUM SERPL-MCNC: 2.1 MG/DL (ref 1.7–2.3)
NONHDLC SERPL-MCNC: 94 MG/DL
POTASSIUM SERPL-SCNC: 4.3 MMOL/L (ref 3.4–5.3)
SODIUM SERPL-SCNC: 141 MMOL/L (ref 136–145)
TRIGL SERPL-MCNC: 159 MG/DL

## 2023-07-21 ENCOUNTER — HOSPITAL ENCOUNTER (OUTPATIENT)
Dept: CARDIOLOGY | Facility: HOSPITAL | Age: 73
Discharge: HOME | End: 2023-07-21
Attending: NURSE PRACTITIONER | Admitting: INTERNAL MEDICINE
Payer: COMMERCIAL

## 2023-07-21 DIAGNOSIS — R55 SYNCOPE, UNSPECIFIED SYNCOPE TYPE: ICD-10-CM

## 2023-07-21 LAB — LVEF ECHO: NORMAL

## 2023-07-21 PROCEDURE — 93306 TTE W/DOPPLER COMPLETE: CPT | Mod: 26 | Performed by: INTERNAL MEDICINE

## 2023-07-21 PROCEDURE — 93306 TTE W/DOPPLER COMPLETE: CPT

## 2023-07-26 DIAGNOSIS — E03.9 HYPOTHYROIDISM, UNSPECIFIED TYPE: ICD-10-CM

## 2023-07-28 RX ORDER — LEVOTHYROXINE SODIUM 100 UG/1
TABLET ORAL
Qty: 90 TABLET | Refills: 1 | Status: SHIPPED | OUTPATIENT
Start: 2023-07-28 | End: 2024-01-17

## 2023-07-28 NOTE — TELEPHONE ENCOUNTER
levothyroxine (SYNTHROID/LEVOTHROID) 100 MCG tablet       Last Written Prescription Date:  2/21/23  Last Fill Quantity: 90,   # refills: 1  Last Office Visit: 7/17/23  Future Office visit:

## 2023-08-14 DIAGNOSIS — E11.9 TYPE 2 DIABETES MELLITUS WITHOUT COMPLICATION, WITHOUT LONG-TERM CURRENT USE OF INSULIN (H): ICD-10-CM

## 2023-08-14 RX ORDER — METFORMIN HCL 500 MG
TABLET, EXTENDED RELEASE 24 HR ORAL
Qty: 180 TABLET | Refills: 1 | Status: SHIPPED | OUTPATIENT
Start: 2023-08-14 | End: 2024-02-05

## 2023-08-14 NOTE — TELEPHONE ENCOUNTER
Metformin      Last Written Prescription Date:  02/21/23  Last Fill Quantity: 180,   # refills: 1  Last Office Visit: 07/17/23  Future Office visit:    Next 5 appointments (look out 90 days)      Aug 15, 2023 12:30 PM  (Arrive by 12:15 PM)  Return Visit with Nova Augustine CNP  [facility] ([facility] ) [address] [place] [address]  [phone]             Routing refill request to provider for review/approval because:

## 2023-08-15 ENCOUNTER — OFFICE VISIT (OUTPATIENT)
Dept: CARDIOLOGY | Facility: OTHER | Age: 73
End: 2023-08-15
Attending: NURSE PRACTITIONER
Payer: COMMERCIAL

## 2023-08-15 VITALS
OXYGEN SATURATION: 97 % | RESPIRATION RATE: 16 BRPM | HEART RATE: 84 BPM | BODY MASS INDEX: 30.99 KG/M2 | HEIGHT: 65 IN | DIASTOLIC BLOOD PRESSURE: 76 MMHG | SYSTOLIC BLOOD PRESSURE: 132 MMHG | WEIGHT: 186 LBS

## 2023-08-15 DIAGNOSIS — E78.2 MIXED HYPERLIPIDEMIA: ICD-10-CM

## 2023-08-15 DIAGNOSIS — I42.2 HYPERTROPHIC CARDIOMYOPATHY (H): ICD-10-CM

## 2023-08-15 DIAGNOSIS — I10 ESSENTIAL HYPERTENSION: ICD-10-CM

## 2023-08-15 DIAGNOSIS — R55 RECURRENT SYNCOPE: ICD-10-CM

## 2023-08-15 DIAGNOSIS — R55 SYNCOPE, UNSPECIFIED SYNCOPE TYPE: Primary | ICD-10-CM

## 2023-08-15 DIAGNOSIS — I51.7 ASYMMETRIC SEPTAL HYPERTROPHY: ICD-10-CM

## 2023-08-15 PROCEDURE — 99214 OFFICE O/P EST MOD 30 MIN: CPT | Performed by: NURSE PRACTITIONER

## 2023-08-15 PROCEDURE — G0463 HOSPITAL OUTPT CLINIC VISIT: HCPCS

## 2023-08-15 RX ORDER — DIAZEPAM 2 MG
TABLET ORAL
Qty: 2 TABLET | Refills: 0 | Status: SHIPPED | OUTPATIENT
Start: 2023-08-15 | End: 2024-01-16

## 2023-08-15 ASSESSMENT — PAIN SCALES - GENERAL: PAINLEVEL: NO PAIN (0)

## 2023-08-15 NOTE — PROGRESS NOTES
[facility] HEART CARE   CARDIOLOGY PROGRESS NOTE     Chief Complaint   Patient presents with    Follow Up    Results     Discuss ECHO          Diagnosis:    ICD-10-CM    1. Syncope, unspecified syncope type  R55       2. Hypertrophic cardiomyopathy (H)  I42.2 Adult Cardiac Mobile Telemetry Monitor     MRI Cardiac w/contrast and flow     diazepam (VALIUM) 2 MG tablet      3. Recurrent syncope  R55 Adult Cardiac Mobile Telemetry Monitor     MRI Cardiac w/contrast and flow     diazepam (VALIUM) 2 MG tablet      4. Asymmetric septal hypertrophy (H)  I42.2       5. Mixed hyperlipidemia  E78.2       6. Essential hypertension  I10             Assessment/Plan:      Recurrent Syncope  Hypertrophic cardiomyopathy  First syncopal episode in July 2022 which brought her to the ED for evaluation. She did have prodromal symptoms of feeling hot and sweaty prior to syncopal episode. She hit her head on the bathroom counter when she had syncopal episode. She recalls waking on the floor and overall felt okay after syncopal episode  TTE 8/2022 showed normal LV function with LVEF 65-70%. Mild thickening of the basal septum, but no doppler evidence of flow acceleration in the LVOT and no obvious HOWARD of the mitral valve.  Leadless EKG monitor 8/2022 without significant findings  Recurrence of syncopal episode in June 2023. She did have prodromal symptoms and then woke up on the floor. She did not go in for evaluation.  Recent TTE 7/2023 shows normla LV function with LVEF 60-65%. Thickening of the anterobasal septum is present.   Given hypertrophic cardiomyopathy, recurrent syncope plan to proceed with further imaging- cardiac MR and MCOT 30-day monitor. Discussed implantable loop recorder but she is not interested at this time.   Thyroid testing has shown low TSH but normal T4, no anemia      Hyperlipidemia  Continue atorvastatin 10 mg once daily  Heart healthy lifestyle encouraged  Recent Labs   Lab Test 07/17/23  1613 01/16/23  1019  "07/29/16  0947 07/28/15  0937   CHOL 133 137   < > 187   HDL 39* 40*   < > 37*   LDL 62 63   < > 118   TRIG 159* 171*   < > 158*   CHOLHDLRATIO  --   --   --  5.1*    < > = values in this interval not displayed.       Hypertension, controlled  Heart healthy lifestyle encouraged  Consider Losartan with history of cough taking Lisinopril    BP Readings from Last 6 Encounters:   08/15/23 132/76   07/17/23 (!) 150/92   01/24/23 130/78   01/16/23 132/86   01/04/23 (!) 152/86   10/13/22 (!) 202/110         Pre-diabetes  Continue management by PCP    Lab Results   Component Value Date    A1C 6.4 07/17/2023    A1C 6.2 01/16/2023    A1C 6.1 07/11/2022       Hypothyroidism  Continue management by PCP  TSH has been low but T4 has been normal   T4 Free   Date Value Ref Range Status   06/29/2021 1.35 0.76 - 1.46 ng/dL Final     Free T4   Date Value Ref Range Status   01/16/2023 1.67 0.90 - 1.70 ng/dL Final     TSH   Date Value Ref Range Status   01/16/2023 0.28 (L) 0.30 - 4.20 uIU/mL Final   09/01/2022 0.19 (L) 0.40 - 4.00 mU/L Final   06/29/2021 0.18 (L) 0.40 - 4.00 mU/L Final           Interval history:  Kayla Lugo is a 72-year old female who presents for cardiology follow-up. She has a history of hypertrophic cardiomyopathy, hypertension, hyperlipidemia. She was initially referred by primary care provider for consult regarding syncopal episode.    Today, Ms. Lugo feels well. She did have another syncopal episode in June 2023- she had been up walking around due to a sharp stabbing pain she gets in her legs. She had to use the bathroom so walked to the bathroom. She voided and proceeded to continue walking. She was just outside of the bathroom and started to feel \"flushed and faint.\" She woke up on the floor face down. She does not think she was out for long but uncertain. She did not hit her head as she did back in October 2022 where she had facial trauma/bruising. When she came to she felt \"normal, fine.\" She does not " recall feeling weak or tired.     She denies any chest discomfort, dyspnea or dyspnea on exertion. She does get occasional episodes of lightheadedness. This does not occur often. No LE edema. No palpitations/racing/skipping sensation in her chest.     She does do her own housework- vacuuming, laundry (up and down the stairs with her laundry basket), recently working on doing a "[employer]ge sale and up and down the stairs. She tolerates this without chest discomfort, dyspnea, lightheadedness, near-syncope.         HPI:    Kayla Lugo is a 72-year old female who was referred by primary care provider for consult regarding syncopal episode. Mrs. Lugo presented to the local emergency department on July 30, 2022 for evaluation of syncopal episode. According to ED documentation she was awake, alert and vitally stable upon arrival to department. Work-up including EKG, troponin, BMP, magnesium, CBC, CXR, head CT were all unremarkable and unrevealing for etiology of syncopal episode. Mrs. Lugo tells me that the day prior to her syncopal episode she had her 20-month grandchild all day. She does not think she had eaten or drank much the day prior due to being busy with her grandchild. It was also a warm day and she had not yet turned on her air conditioning. The morning of the incident she woke up feeling well and was throwing a pile of laundry in front of the basement door down the basement. She denies any chest pain, dyspnea, palpitations, headache prior to syncopal episode.     She has a cardiac history including hypertension, hyperlipidemia. She has a noncardiac history of diabetes mellitus, hypothyroidism, .     Denies chest/intrascapular/arm/neck/jaw discomfort. No sensation of palpitations/fluttering/skipping in chest. No dyspnea, dyspnea on exertion. No lower extremity edema. She enjoys walking and can do this without symptoms (typically walks 4-5 blocks).     Sleeping history: Sleeps in bed, she does snore. No witness  apnea. No orthopnea, PND. Wakes up early 3:30/4 am and feels well rested.     Smoking History: Never smoked    Substance Abuse History: None    Alcohol Abuse History: None    Family History: Father-  at 49 y.o. due to alcohol abuse, mom- passed away at age 81 years old d/t issues with her lungs, son- aged 51 and has had three MIs        RELEVANT TESTING:  Transthoracic Echocardiogram 2023  Interpretation Summary  Global and regional left ventricular function is normal with an EF of 60-65%.  Left ventricular wall thickness is normal. Left ventricular size is normal.  Thickening of the anterobasal septum is present. Left ventricular diastolic  function is indeterminate. No regional wall motion abnormalities are seen.  Right ventricular function, chamber size, wall motion, and thickness are  normal.  The inferior vena cava is normal.  No pericardial effusion is present.    Leadless EKG Monitor 2022  Official reading pending.   Review of zio report no tachybrady arrhythmia. No pauses or heart block.      Echocardiogram 2022   Interpretation Summary  Global and regional left ventricular function is hyperkinetic with an EF of  65-70%.  Mild thickening of the basal septum, but no doppler evidence of flow  acceleration in the LVOT and no obvious HOWARD of the mitral valve.  Global right ventricular function is normal.  No hemodynamically significant valve abnormalities.  The inferior vena cava is normal.  There is no prior study for direct comparison.      Past Medical History:   Diagnosis Date    Diabetes mellitus, type 2 (H) 10/22/2014    Eczema 2011    Esophageal reflux 2011    Helicobacter pylori (H. pylori) 2011    history of     Osteoarthrosis, unspecified whether generalized or localized, lower leg 03/15/2001    Yuval Rapp MD     Personal history of other endocrine, metabolic, and immunity disorders 2011    Unspecified acquired hypothyroidism 2011    Natalia Starr MD     Unspecified essential hypertension 03/27/2001    Geremias Rouse MD     Unspecified hearing loss, left 06/09/2011       Past Surgical History:   Procedure Laterality Date    ARTHROSCOPY KNEE  2001    RT     COLONOSCOPY  2004    COLONOSCOPY  06/13/2012    Dr. Gorman; 5 year recall given    COLONOSCOPY  07/11/2017    COLONOSCOPY  04/2023    colonoscopy with polypectomy  2007    repeat in five     DILATION AND CURETTAGE  1996    EXCISE LESION LIP Right 09/16/2014    Procedure: EXCISE LESION LIP;  Surgeon: Bri Flores MD;  Location: [facility] OR    ORTHOPEDIC SURGERY Right 10/05/2015    right shoulder arthoscopy RCR    NADIRA with BSO  1997       Allergies   Allergen Reactions    Hydrochlorothiazide Rash    Atenolol Other (See Comments)     Bradycardia     Lisinopril Cough       Current Outpatient Medications   Medication Sig Dispense Refill    atorvastatin (LIPITOR) 10 MG tablet TAKE 1 TABLET(10 MG) BY MOUTH DAILY 90 tablet 1    blood glucose (ACCU-CHEK SMARTVIEW) test strip Use to test blood sugar one time daily. 100 strip 3    blood glucose calibration (ACCU-CHEK SMARTVIEW CONTROL) solution Use to calibrate blood glucose monitor as directed. 1 Bottle 3    blood glucose monitoring (ACCU-CHEK FASTCLIX) lancets USE TO TEST BLOOD SUGARS ONCE DAILY 102 each 3    blood glucose monitoring (ACCU-CHEK DESTINI SMARTVIEW) meter device kit Use to test blood sugar one time daily. 1 kit 0    diazepam (VALIUM) 2 MG tablet Take 2 mg 1 hour before MRI may repeat additional 2 mg 1/2 hour prior if needed 2 tablet 0    levothyroxine (SYNTHROID/LEVOTHROID) 100 MCG tablet TAKE 1 TABLET(100 MCG) BY MOUTH DAILY 90 tablet 1    metFORMIN (GLUCOPHAGE XR) 500 MG 24 hr tablet TAKE 2 TABLETS(1000 MG) BY MOUTH AT BEDTIME 180 tablet 1    vitamin D3 (CHOLECALCIFEROL) 1000 units (25 mcg) tablet Take 4 tablets by mouth daily.         Social History     Socioeconomic History    Marital status:      Spouse name: Not on file    Number of children:  "Not on file    Years of education: Not on file    Highest education level: Not on file   Occupational History    Not on file   Tobacco Use    Smoking status: Never    Smokeless tobacco: Never   Substance and Sexual Activity    Alcohol use: No     Alcohol/week: 0.0 standard drinks of alcohol    Drug use: No    Sexual activity: Not Currently   Other Topics Concern     Service Not Asked    Blood Transfusions Yes    Caffeine Concern Yes     Comment: Coffee 3 cups daily     Occupational Exposure Not Asked    Hobby Hazards Not Asked    Sleep Concern Not Asked    Stress Concern Not Asked    Weight Concern Not Asked    Special Diet Not Asked    Back Care Not Asked    Exercise Yes     Comment: Walking     Bike Helmet Not Asked    Seat Belt Not Asked    Self-Exams Not Asked    Parent/sibling w/ CABG, MI or angioplasty before 65F 55M? Not Asked   Social History Narrative    Not on file     Social Determinants of Health     Financial Resource Strain: Not on file   Food Insecurity: Not on file   Transportation Needs: Not on file   Physical Activity: Not on file   Stress: Not on file   Social Connections: Not on file   Intimate Partner Violence: Not on file   Housing Stability: Not on file       LAB RESULTS:   Orders placed or performed in visit on 08/15/23    diazepam (VALIUM) 2 MG tablet         Review of systems: Negative except that which was noted in the HPI.    Physical examination:    Vitals: /76   Pulse 84   Resp 16   Ht 1.651 m (5' 5\")   Wt 84.4 kg (186 lb)   SpO2 97%   BMI 30.95 kg/m    BMI= Body mass index is 30.95 kg/m .      GENERAL APPEARANCE: healthy, alert and no distress  HEENT: no icterus, no xanthelasmas, normal pupil size and reaction, no cyanosis.  NECK: no adenopathy, no asymmetry, masses.  CHEST: lungs clear to auscultation - no rales, rhonchi or wheezes, no use of accessory muscles, no retractions, respirations are unlabored, normal respiratory rate  CARDIOVASCULAR: regular rhythm, " normal S1 with physiologic split S2, no S3 or S4 and no murmur, click or rub  EXTREMITIES: no clubbing, cyanosis or edema  NEURO: alert and oriented normal speech, and affect  VASC: No vascular bruits heard.  SKIN: no ecchymoses, no rashes        Thank you for allowing me to participate in the care of your patient. Please do not hesitate to contact me if you have any questions.       Nova Augustine, CNP

## 2023-08-15 NOTE — PATIENT INSTRUCTIONS
Thank you for allowing Nova Augustine CNP and our  team to participate in your care. Please call our office at [phone] with scheduling questions or if you need to cancel or change your appointment. With any other questions or concerns you may call cardiology nurse at [phone] or [phone].       If you experience chest pain, chest pressure, chest tightness, shortness of breath, fainting, lightheadedness, nausea, vomiting, or other concerning symptoms, please report to the Emergency Department or call 911. These symptoms may be emergent, and best treated in the Emergency Department.        Plan for cardiac MR at  in [place]. Someone from there should call you in a week or so.     Plan for cardiac monitoring to evaluate potential arrhythmia. Given recurrent syncope could also consider implantable loop recorder.     Stay hydrated      Follow-up pending results of testing    Nova Augustine CNP

## 2023-08-25 ENCOUNTER — TELEPHONE (OUTPATIENT)
Dept: CARDIOLOGY | Facility: OTHER | Age: 73
End: 2023-08-25

## 2023-08-25 NOTE — TELEPHONE ENCOUNTER
Patient has not heard from DI at [facility] regarding Cardiac MRI appt. Spoke with [facility] they state they still do not have the order. Faxed order to number provided by MRI scheduling Dept. Patient notified this was done.

## 2023-08-25 NOTE — TELEPHONE ENCOUNTER
----- Message from Aleksandra Westfall sent at 8/23/2023 11:56 AM CDT -----  Its fine. It should be all taken care of now.    ----- Message -----  From: Siena Ramírez LPN  Sent: 8/23/2023  11:14 AM CDT  To: Aleksandra Westfall    I can't really remember for sure sorrry  ----- Message -----  From: Aleksandra Westfall  Sent: 8/23/2023   8:46 AM CDT  To: Siena Ramírez LPN    Also if mikelonck did it the first time, send it to her. It is resent.  ----- Message -----  From: Siena Ramírez LPN  Sent: 8/23/2023   8:39 AM CDT  To: Aleksandra Westfall; [facility]    Please resent the order to [facility] for the Cardiac MRI. Patient still awaiting a call. I believe Elvira had attempted to send this last week.

## 2023-08-25 NOTE — TELEPHONE ENCOUNTER
[facility] sent me a fax requesting chart notes from 8/15 visit before scheduling this patient. Please let me know when the notes are completed and signed

## 2023-10-20 ENCOUNTER — TELEPHONE (OUTPATIENT)
Dept: FAMILY MEDICINE | Facility: OTHER | Age: 73
End: 2023-10-20

## 2023-10-20 ENCOUNTER — HOSPITAL ENCOUNTER (EMERGENCY)
Facility: HOSPITAL | Age: 73
Discharge: HOME | End: 2023-10-20
Attending: NURSE PRACTITIONER | Admitting: NURSE PRACTITIONER
Payer: COMMERCIAL

## 2023-10-20 ENCOUNTER — APPOINTMENT (OUTPATIENT)
Dept: GENERAL RADIOLOGY | Facility: HOSPITAL | Age: 73
End: 2023-10-20
Attending: NURSE PRACTITIONER
Payer: COMMERCIAL

## 2023-10-20 VITALS
WEIGHT: 187 LBS | DIASTOLIC BLOOD PRESSURE: 94 MMHG | HEART RATE: 72 BPM | BODY MASS INDEX: 31.16 KG/M2 | OXYGEN SATURATION: 97 % | TEMPERATURE: 98.2 F | SYSTOLIC BLOOD PRESSURE: 187 MMHG | RESPIRATION RATE: 16 BRPM | HEIGHT: 65 IN

## 2023-10-20 DIAGNOSIS — M17.12 OSTEOARTHRITIS OF LEFT KNEE, UNSPECIFIED OSTEOARTHRITIS TYPE: ICD-10-CM

## 2023-10-20 DIAGNOSIS — I10 ESSENTIAL HYPERTENSION: ICD-10-CM

## 2023-10-20 DIAGNOSIS — M17.12 OSTEOARTHRITIS OF LEFT KNEE: Primary | ICD-10-CM

## 2023-10-20 PROCEDURE — 99213 OFFICE O/P EST LOW 20 MIN: CPT | Performed by: NURSE PRACTITIONER

## 2023-10-20 PROCEDURE — G0463 HOSPITAL OUTPT CLINIC VISIT: HCPCS

## 2023-10-20 PROCEDURE — 73562 X-RAY EXAM OF KNEE 3: CPT | Mod: LT

## 2023-10-20 RX ORDER — PREDNISONE 20 MG/1
TABLET ORAL
Qty: 10 TABLET | Refills: 0 | Status: SHIPPED | OUTPATIENT
Start: 2023-10-20 | End: 2024-01-16

## 2023-10-20 ASSESSMENT — ENCOUNTER SYMPTOMS
LIGHT-HEADEDNESS: 0
PHOTOPHOBIA: 0
HEADACHES: 0
DIZZINESS: 0
DIARRHEA: 0
SHORTNESS OF BREATH: 0
EYE PAIN: 0
PSYCHIATRIC NEGATIVE: 1
VOMITING: 0
FEVER: 0
CHILLS: 0
NAUSEA: 0

## 2023-10-20 NOTE — ED TRIAGE NOTES
Patient presents to urgent care with left knee pain. Patient states no injury. Patient has been taken tylenol for the pain. Patient reports that she has pain with sitting to standing, walking or any pressure on it. Patient also reports she has been dealing with the knee pain since 9/3/23. Tried to get into primary but it wasn't until the 30th of October.

## 2023-10-20 NOTE — TELEPHONE ENCOUNTER
8:10 AM    Reason for Call: OVERBOOK    Patient is having the following symptoms: sore left knee for 3  weeks. Getting worse    The patient is requesting an appointment for today with Dr. Starr.    Was an appointment offered for this call? No  If yes : Appointment type              Date    Preferred method for responding to this message: Telephone Call  What is your phone number ? [phone]     If we cannot reach you directly, may we leave a detailed response at the number you provided? Yes    Can this message wait until your PCP/provider returns, if unavailable today? Denae Vargas

## 2023-10-20 NOTE — ED PROVIDER NOTES
History     Chief Complaint   Patient presents with    Knee Pain     HPI  Kayla Lugo is a 73 year old female who presents to urgent care today (ambulatory) with complaints of left knee pain which has been ongoing since 9/3/2023.  Patient states she was getting up from the dining room table and had turned to leave the chair and started to experience left knee pain.  Denies any significant history of left knee pain.  Denies any fall, injury or trauma.  Denies any fever, chills, nausea, vomiting, diarrhea, shortness of breath or chest pain.  No open skin wounds, redness, swelling.  No other concerns.    Allergies:  Allergies   Allergen Reactions    Hydrochlorothiazide Rash    Atenolol Other (See Comments)     Bradycardia     Lisinopril Cough       Problem List:    Patient Active Problem List    Diagnosis Date Noted    SCC (squamous cell carcinoma), arm, right 07/11/2022     Priority: Medium    Personal history of other malignant neoplasm of skin 03/16/2015     Priority: Medium     right upper lip BCC 2014      History of labyrinthitis 12/22/2014     Priority: Medium    Type 2 diabetes mellitus without complication, without long-term current use of insulin (H) 10/08/2014     Priority: Medium    Basal cell carcinoma of upper lip 09/03/2014     Priority: Medium    S/P excision of skin lesion, follow-up exam 09/03/2014     Priority: Medium    Skin lesion of face 08/28/2014     Priority: Medium    Advanced care planning/counseling discussion 10/02/2012     Priority: Medium    Esophageal reflux 06/09/2011     Priority: Medium    Eczema 06/09/2011     Priority: Medium    Personal history of colonic polyps 07/25/2006     Priority: Medium     Adenomatous polyp removed April 2004. Repeat colonoscopy in 2007, repeat again 2010      Adjustment disorder with depressed mood 11/09/2005     Priority: Medium    Personal history of other disorders of nervous system and sense organs 11/09/2005     Priority: Medium     As young  child, no recurrence      Dysmetabolic syndrome X 11/08/2004     Priority: Medium     Obesity, HTN      Family history of ischemic heart disease 11/08/2004     Priority: Medium     Father with MI and death at 49  CVD risk factors: Metabolic syndrome, HTN, obesity      Mixed hyperlipidemia 06/16/2004     Priority: Medium     low HDL, father with MI and death at 49      Obesity 06/16/2004     Priority: Medium    Photokeratitis 06/16/2004     Priority: Medium     Dr. Lizzie Cortez  Effudex, stay out of sun      Essential hypertension 03/27/2001     Priority: Medium    Osteoarthrosis, unspecified whether generalized or localized, involving lower leg 03/15/2001     Priority: Medium    Hypothyroidism 07/12/1999     Priority: Medium     Secondary to Hashimoto's thyroiditis.  Synthroid        Hearing loss 07/12/1999     Priority: Medium     Left ear          Past Medical History:    Past Medical History:   Diagnosis Date    Diabetes mellitus, type 2 (H) 10/22/2014    Eczema 06/09/2011    Esophageal reflux 06/09/2011    Helicobacter pylori (H. pylori) 06/09/2011    Osteoarthrosis, unspecified whether generalized or localized, lower leg 03/15/2001    Personal history of other endocrine, metabolic, and immunity disorders 06/09/2011    Unspecified acquired hypothyroidism 06/09/2011    Unspecified essential hypertension 03/27/2001    Unspecified hearing loss, left 06/09/2011       Past Surgical History:    Past Surgical History:   Procedure Laterality Date    ARTHROSCOPY KNEE  2001    RT     COLONOSCOPY  2004    COLONOSCOPY  06/13/2012    Dr. Gorman; 5 year recall given    COLONOSCOPY  07/11/2017    COLONOSCOPY  04/2023    colonoscopy with polypectomy  2007    repeat in five     DILATION AND CURETTAGE  1996    EXCISE LESION LIP Right 09/16/2014    Procedure: EXCISE LESION LIP;  Surgeon: Bri Flores MD;  Location: [facility]    ORTHOPEDIC SURGERY Right 10/05/2015    right shoulder arthoscopy RCR    NADIRA with BSO  1997  "      Family History:    Family History   Problem Relation Age of Onset    Alcohol/Drug Father         alcoholism    C.A.D. Father         (cause of death)     Cancer Mother         basal cell    Ovarian Cancer Mother     Alcohol/Drug Son         alcoholism     C.A.D. Son     Diabetes Brother     Diabetes Maternal Grandmother        Social History:  Marital Status:   [5]  Social History     Tobacco Use    Smoking status: Never    Smokeless tobacco: Never   Substance Use Topics    Alcohol use: No     Alcohol/week: 0.0 standard drinks of alcohol    Drug use: No        Medications:    atorvastatin (LIPITOR) 10 MG tablet  blood glucose (ACCU-CHEK SMARTVIEW) test strip  blood glucose calibration (ACCU-CHEK SMARTVIEW CONTROL) solution  blood glucose monitoring (ACCU-CHEK FASTCLIX) lancets  blood glucose monitoring (ACCU-CHEK DESTINI SMARTVIEW) meter device kit  diazepam (VALIUM) 2 MG tablet  levothyroxine (SYNTHROID/LEVOTHROID) 100 MCG tablet  metFORMIN (GLUCOPHAGE XR) 500 MG 24 hr tablet  predniSONE (DELTASONE) 20 MG tablet  vitamin D3 (CHOLECALCIFEROL) 1000 units (25 mcg) tablet      Review of Systems   Constitutional:  Negative for chills and fever.   HENT:  Negative for ear discharge and nosebleeds.    Eyes:  Negative for photophobia, pain and visual disturbance.   Respiratory:  Negative for shortness of breath.    Cardiovascular:  Negative for chest pain.   Gastrointestinal:  Negative for diarrhea, nausea and vomiting.   Musculoskeletal:  Negative for gait problem.        Left knee pain   Skin: Negative.    Neurological:  Negative for dizziness, light-headedness and headaches.   Psychiatric/Behavioral: Negative.       Physical Exam   BP: (!) 206/84  Pulse: 72  Temp: 98.2  F (36.8  C)  Resp: 16  Height: 165.1 cm (5' 5\")  Weight: 84.8 kg (187 lb)  SpO2: 97 %  Recheck  BP: 187/94    Physical Exam  Vitals and nursing note reviewed.   Constitutional:       General: She is not in acute distress.     Appearance: Normal " appearance. She is not ill-appearing or toxic-appearing.   Cardiovascular:      Rate and Rhythm: Normal rate and regular rhythm.      Pulses: Normal pulses.      Heart sounds: Normal heart sounds.   Pulmonary:      Effort: Pulmonary effort is normal.      Breath sounds: Normal breath sounds.   Musculoskeletal:      Left knee: No swelling, deformity, effusion, erythema, ecchymosis, lacerations or crepitus. Normal range of motion. Tenderness present over the lateral joint line. No medial joint line tenderness. Normal pulse.      Left lower leg: Normal.      Left ankle: Normal.   Skin:     General: Skin is warm and dry.      Capillary Refill: Capillary refill takes less than 2 seconds.   Neurological:      Mental Status: She is alert.   Psychiatric:         Mood and Affect: Mood normal.       ED Course     Procedures    Results for orders placed or performed during the hospital encounter of 10/20/23 (from the past 24 hour(s))   XR Knee Left 3 Views    Narrative    PROCEDURE:  XR KNEE LEFT 3 VIEWS    HISTORY: left knee pain.    COMPARISON:  None.    TECHNIQUE:  3 views left knee.    FINDINGS:  No acute fracture. Progressive advanced tricompartmental  osteoarthritis, most severe in the patellofemoral compartment.       Impression    IMPRESSION: Severe osteoarthritis.      JUSTICE CARVER MD         SYSTEM ID:  [id number]       Medications - No data to display    Assessments & Plan (with Medical Decision Making)     I have reviewed the nursing notes.    I have reviewed the findings, diagnosis, plan and need for follow up with the patient.  (M17.12) Osteoarthritis of left knee  (primary encounter diagnosis)  Plan: Orthopedic  Referral  (I10) Essential hypertension  Plan:   Patient ambulatory with a nontoxic appearance.  Patient able to stand from a seated position in order to ambulate.  Patient arrived with complaints of left knee pain which has been ongoing since 9/3/2023.  No open skin wounds, redness,  swelling or signs of infection.  Distal pulses 2+.  Denies any fever, chills, nausea, vomiting, diarrhea, shortness of breath or chest pain.  Left knee x-ray completed which shows severe osteoarthritis.  Patient to follow RICE.  Ace wrap as needed.  Take prednisone as ordered, monitor blood sugars closely while taking medication.  [facility] if no improvement with above.  Follow-up with primary care provider in 3 days for further monitoring regarding elevated blood pressure reading in urgent care.  Denies any symptomatic hypertension at this time, denies any headache, dizziness, vision changes, nosebleeds, ear drainage, neck pain or stiffness or abdominal pain.  Return to urgent care/ED with any worsening in condition or additional concerns.  Patient in agreement with treatment plan.    New Prescriptions    PREDNISONE (DELTASONE) 20 MG TABLET    Take two tablets (= 40mg) each day for 5 (five) days     Final diagnoses:   Osteoarthritis of left knee   Essential hypertension     10/20/2023   [facility]     Pallavi Dyson NP  10/20/23 1106

## 2023-10-20 NOTE — DISCHARGE INSTRUCTIONS
Prednisone as ordered    Rest  Ice  Compression with ace wrap as needed  Elevate    [facility] for further evaluation as needed if no improvement with above by calling [phone].    Follow up with primary care provider for follow up regarding elevated blood pressure reading found in urgent care and return to the emergency department with any symptomatic hypertension    Follow-up with primary care provider or return to urgent care/ED with any worsening in condition or additional concerns.  
78 yo male with h/o depression in the ER c/o suicidal thoughts. Pt reports he has not been taking his psychiatric medications for about a year. Denies any suicidal attempts or plans. Pt has no other complaints  Pt cooperative and calm on exam. labs done. pending evaluation by psychiatrist.

## 2023-10-20 NOTE — TELEPHONE ENCOUNTER
Attempt # 1  Outcome: Talked with Patient    Comment: Patient went to Urgent Care but patient states if knee doesn't get better she will call back to schedule.

## 2023-11-09 ENCOUNTER — TRANSFERRED RECORDS (OUTPATIENT)
Dept: HEALTH INFORMATION MANAGEMENT | Facility: CLINIC | Age: 73
End: 2023-11-09

## 2023-12-11 DIAGNOSIS — E78.5 HYPERLIPIDEMIA, UNSPECIFIED HYPERLIPIDEMIA TYPE: ICD-10-CM

## 2023-12-11 NOTE — TELEPHONE ENCOUNTER
Lipitor      Last Written Prescription Date:  06/16/23  Last Fill Quantity: 90,   # refills: 1  Last Office Visit: 07/17/23  Future Office visit:    Next 5 appointments (look out 90 days)      Jan 18, 2024 11:00 AM  (Arrive by 10:45 AM)  SHORT with Natalia Satrr MD  [facility] ([facility] ) [address]  [facility] [address]  [phone]                Patient can follow-up with me or another collaborative care psychiatrist in [place] or [facility].  Would be able to see me soonest

## 2023-12-12 RX ORDER — ATORVASTATIN CALCIUM 10 MG/1
TABLET, FILM COATED ORAL
Qty: 90 TABLET | Refills: 1 | Status: SHIPPED | OUTPATIENT
Start: 2023-12-12 | End: 2024-06-07

## 2023-12-27 ENCOUNTER — TRANSFERRED RECORDS (OUTPATIENT)
Dept: HEALTH INFORMATION MANAGEMENT | Facility: CLINIC | Age: 73
End: 2023-12-27

## 2023-12-27 LAB — RETINOPATHY: NEGATIVE

## 2024-01-02 ENCOUNTER — OFFICE VISIT (OUTPATIENT)
Dept: DERMATOLOGY | Facility: OTHER | Age: 74
End: 2024-01-02
Attending: DERMATOLOGY
Payer: COMMERCIAL

## 2024-01-02 VITALS
OXYGEN SATURATION: 97 % | TEMPERATURE: 97.9 F | DIASTOLIC BLOOD PRESSURE: 90 MMHG | HEART RATE: 80 BPM | SYSTOLIC BLOOD PRESSURE: 172 MMHG

## 2024-01-02 DIAGNOSIS — Z12.83 SCREENING FOR SKIN CANCER: ICD-10-CM

## 2024-01-02 DIAGNOSIS — L81.4 SOLAR LENTIGO: ICD-10-CM

## 2024-01-02 DIAGNOSIS — Z85.828 HISTORY OF NONMELANOMA SKIN CANCER: Primary | ICD-10-CM

## 2024-01-02 PROCEDURE — 17110 DESTRUCTION B9 LES UP TO 14: CPT | Performed by: DERMATOLOGY

## 2024-01-02 PROCEDURE — 17110 DESTRUCTION B9 LES UP TO 14: CPT

## 2024-01-02 PROCEDURE — G0463 HOSPITAL OUTPT CLINIC VISIT: HCPCS

## 2024-01-02 ASSESSMENT — PAIN SCALES - GENERAL: PAINLEVEL: NO PAIN (0)

## 2024-01-02 NOTE — PROGRESS NOTES
S: Recheck visit for Kayla whom I saw last year for a lesion on her arm.  It turned out to be squamous cell carcinoma.  It was removed surgically by Dr. Mireles.  She would like skin evaluation again today.    O: Examination of the back face chest breasts arms showed only seborrheic keratoses and some benign nevi.    A:, But no worrisome lesions.  Resolved squamous of carcinoma arm.    PE: Reassured - a few seborrheic keratoses treated with \liquid nitrogen.    Return 1 year or as needed.    15 minutes spent in exam, discussion, counseling and charting.

## 2024-01-02 NOTE — LETTER
1/2/2024       RE: Kayla Lugo  [address] [place] [address] [address]  [facility] [address]     Dear Colleague,    Thank you for referring your patient, Kayla Lugo, to the [facility]. Please see a copy of my visit note below.    S: Recheck visit for Kayla whom I saw last year for a lesion on her arm.  It turned out to be squamous cell carcinoma.  It was removed surgically by Dr. Mireles.  She would like skin evaluation again today.    O: Examination of the back face chest breasts arms showed only seborrheic keratoses and some benign nevi.    A:, But no worrisome lesions.  Resolved squamous of carcinoma arm.    PE: Reassured - a few seborrheic keratoses treated with \liquid nitrogen.    Return 1 year or as needed.    15 minutes spent in exam, discussion, counseling and charting.     Again, thank you for allowing me to participate in the care of your patient.      Sincerely,    JELANI Amezquita MD

## 2024-01-16 ENCOUNTER — OFFICE VISIT (OUTPATIENT)
Dept: CARDIOLOGY | Facility: OTHER | Age: 74
End: 2024-01-16
Attending: NURSE PRACTITIONER
Payer: COMMERCIAL

## 2024-01-16 ENCOUNTER — TELEPHONE (OUTPATIENT)
Dept: CARDIOLOGY | Facility: OTHER | Age: 74
End: 2024-01-16

## 2024-01-16 VITALS
OXYGEN SATURATION: 98 % | HEIGHT: 65 IN | WEIGHT: 187.4 LBS | BODY MASS INDEX: 31.22 KG/M2 | SYSTOLIC BLOOD PRESSURE: 150 MMHG | DIASTOLIC BLOOD PRESSURE: 78 MMHG

## 2024-01-16 DIAGNOSIS — E03.9 HYPOTHYROIDISM, UNSPECIFIED TYPE: ICD-10-CM

## 2024-01-16 DIAGNOSIS — I42.2 HYPERTROPHIC CARDIOMYOPATHY (H): Primary | ICD-10-CM

## 2024-01-16 DIAGNOSIS — E11.9 TYPE 2 DIABETES MELLITUS WITHOUT COMPLICATION, WITHOUT LONG-TERM CURRENT USE OF INSULIN (H): ICD-10-CM

## 2024-01-16 DIAGNOSIS — I10 ESSENTIAL HYPERTENSION: ICD-10-CM

## 2024-01-16 DIAGNOSIS — R55 RECURRENT SYNCOPE: ICD-10-CM

## 2024-01-16 DIAGNOSIS — E78.2 MIXED HYPERLIPIDEMIA: ICD-10-CM

## 2024-01-16 LAB
ALBUMIN SERPL BCG-MCNC: 4.3 G/DL (ref 3.5–5.2)
ALP SERPL-CCNC: 61 U/L (ref 40–150)
ALT SERPL W P-5'-P-CCNC: 15 U/L (ref 0–50)
ANION GAP SERPL CALCULATED.3IONS-SCNC: 11 MMOL/L (ref 7–15)
AST SERPL W P-5'-P-CCNC: 19 U/L (ref 0–45)
ATRIAL RATE - MUSE: 79 BPM
BILIRUB SERPL-MCNC: 0.4 MG/DL
BUN SERPL-MCNC: 15.9 MG/DL (ref 8–23)
CALCIUM SERPL-MCNC: 9.6 MG/DL (ref 8.8–10.2)
CHLORIDE SERPL-SCNC: 102 MMOL/L (ref 98–107)
CREAT SERPL-MCNC: 0.79 MG/DL (ref 0.51–0.95)
DEPRECATED HCO3 PLAS-SCNC: 26 MMOL/L (ref 22–29)
DIASTOLIC BLOOD PRESSURE - MUSE: NORMAL MMHG
EGFRCR SERPLBLD CKD-EPI 2021: 79 ML/MIN/1.73M2
ERYTHROCYTE [DISTWIDTH] IN BLOOD BY AUTOMATED COUNT: 13.2 % (ref 10–15)
EST. AVERAGE GLUCOSE BLD GHB EST-MCNC: 126 MG/DL
GLUCOSE SERPL-MCNC: 99 MG/DL (ref 70–99)
HBA1C MFR BLD: 6 %
HCT VFR BLD AUTO: 43.8 % (ref 35–47)
HGB BLD-MCNC: 14.2 G/DL (ref 11.7–15.7)
INTERPRETATION ECG - MUSE: NORMAL
MAGNESIUM SERPL-MCNC: 2.2 MG/DL (ref 1.7–2.3)
MCH RBC QN AUTO: 28 PG (ref 26.5–33)
MCHC RBC AUTO-ENTMCNC: 32.4 G/DL (ref 31.5–36.5)
MCV RBC AUTO: 86 FL (ref 78–100)
P AXIS - MUSE: 29 DEGREES
PLATELET # BLD AUTO: 178 10E3/UL (ref 150–450)
POTASSIUM SERPL-SCNC: 4.4 MMOL/L (ref 3.4–5.3)
PR INTERVAL - MUSE: 160 MS
PROT SERPL-MCNC: 7.3 G/DL (ref 6.4–8.3)
QRS DURATION - MUSE: 100 MS
QT - MUSE: 396 MS
QTC - MUSE: 454 MS
R AXIS - MUSE: -26 DEGREES
RBC # BLD AUTO: 5.08 10E6/UL (ref 3.8–5.2)
SODIUM SERPL-SCNC: 139 MMOL/L (ref 135–145)
SYSTOLIC BLOOD PRESSURE - MUSE: NORMAL MMHG
T AXIS - MUSE: 44 DEGREES
T4 FREE SERPL-MCNC: 1.72 NG/DL (ref 0.9–1.7)
TSH SERPL DL<=0.005 MIU/L-ACNC: 0.16 UIU/ML (ref 0.3–4.2)
VENTRICULAR RATE- MUSE: 79 BPM
WBC # BLD AUTO: 5.1 10E3/UL (ref 4–11)

## 2024-01-16 PROCEDURE — 83735 ASSAY OF MAGNESIUM: CPT | Mod: ZL | Performed by: NURSE PRACTITIONER

## 2024-01-16 PROCEDURE — 85027 COMPLETE CBC AUTOMATED: CPT | Mod: ZL | Performed by: NURSE PRACTITIONER

## 2024-01-16 PROCEDURE — 84439 ASSAY OF FREE THYROXINE: CPT | Mod: ZL | Performed by: NURSE PRACTITIONER

## 2024-01-16 PROCEDURE — 84443 ASSAY THYROID STIM HORMONE: CPT | Mod: ZL | Performed by: NURSE PRACTITIONER

## 2024-01-16 PROCEDURE — 83036 HEMOGLOBIN GLYCOSYLATED A1C: CPT | Mod: ZL | Performed by: NURSE PRACTITIONER

## 2024-01-16 PROCEDURE — 80053 COMPREHEN METABOLIC PANEL: CPT | Mod: ZL | Performed by: NURSE PRACTITIONER

## 2024-01-16 PROCEDURE — 99214 OFFICE O/P EST MOD 30 MIN: CPT | Performed by: NURSE PRACTITIONER

## 2024-01-16 PROCEDURE — 93010 ELECTROCARDIOGRAM REPORT: CPT | Mod: 76 | Performed by: INTERNAL MEDICINE

## 2024-01-16 PROCEDURE — 36415 COLL VENOUS BLD VENIPUNCTURE: CPT | Mod: ZL | Performed by: NURSE PRACTITIONER

## 2024-01-16 PROCEDURE — G0463 HOSPITAL OUTPT CLINIC VISIT: HCPCS

## 2024-01-16 PROCEDURE — 93005 ELECTROCARDIOGRAM TRACING: CPT | Performed by: NURSE PRACTITIONER

## 2024-01-16 ASSESSMENT — PAIN SCALES - GENERAL: PAINLEVEL: NO PAIN (0)

## 2024-01-16 NOTE — PROGRESS NOTES
[facility]   CARDIOLOGY PROGRESS NOTE     Chief Complaint   Patient presents with    Follow Up     Discuss MRI Fainting spells          Diagnosis:    ICD-10-CM    1. Hypertrophic cardiomyopathy (H)  I42.2 EKG 12-lead complete w/read - (Clinic Performed)     Echocardiogram Complete      2. Recurrent syncope  R55 Echocardiogram Complete     Comprehensive metabolic panel     CBC with platelets     Hemoglobin A1c     Magnesium     TSH with free T4 reflex     Comprehensive metabolic panel     CBC with platelets     Hemoglobin A1c     Magnesium     TSH with free T4 reflex      3. Mixed hyperlipidemia  E78.2 Comprehensive metabolic panel     Comprehensive metabolic panel      4. Essential hypertension  I10 Comprehensive metabolic panel     CBC with platelets     Hemoglobin A1c     Magnesium     Comprehensive metabolic panel     CBC with platelets     Hemoglobin A1c     Magnesium      5. Type 2 diabetes mellitus without complication, without long-term current use of insulin (H)  E11.9 Comprehensive metabolic panel     CBC with platelets     Hemoglobin A1c     Magnesium     Comprehensive metabolic panel     CBC with platelets     Hemoglobin A1c     Magnesium      6. Hypothyroidism, unspecified type  E03.9 TSH with free T4 reflex     TSH with free T4 reflex            Assessment/Plan:      Recurrent Syncope  Anterobasal thickening  First syncopal episode in July 2022 which brought her to the ED for evaluation. She did have prodromal symptoms of feeling hot and sweaty prior to syncopal episode. She hit her head on the bathroom counter when she had syncopal episode. She recalls waking on the floor and overall felt okay after syncopal episode  TTE 8/2022   normal LV function with LVEF 65-70%  Mild thickening of the basal septum, but no doppler evidence of flow acceleration in the LVOT and no obvious HOWARD of the mitral valve.  Leadless EKG monitor 8/2022 without significant findings  Recurrence of syncopal episode in June  2023. She did have prodromal symptoms and then woke up on the floor. She did not go in for evaluation.  TTE 7/2023:  normal LV function with LVEF 60-65%  Thickening of the anterobasal septum is present   Given thickening of the anterobasal septum and recurrent syncope planned to proceed with further imaging- cardiac MR and MCOT 30-day monitor. Discussed implantable loop recorder but she was not interested at that time.   Cardiac MR 10/2023 at [facility]  The left ventricle is normal in cavity size. The wall thickness is mildly increased at basal septal and anteroseptal walls with max thickness of 14 mm (sigmoid septum). There is no increased wall thickness in the rest of myocardium. The total LV mass is within normal limits. Global systolic function is normal. There are no regional wall motion abnormalities. The quantitative LV ejection fraction is  67  %.   Findings are not compatible with hypertrophic obstructive cardiomyopathy in the conventional fashion. There is however sigmoid septum present at maximum thickness of 14 mm, with mildly increased LVOT gradient as per echo report. Clinical correlation is required.   Syncopal event 1/14/2023. She did not go in for evaluation. She has not yet worn heart monitor. Plan to repeat TTE, heart monitor. Reviewed cardiac MR results from 10/2023. She is not having dyspnea on exertion, exertional chest discomfort, exertional lightheadedness. She is having recurrent syncopal events typically after she has been up and moving. I am not certain her recurrent syncopal events are related to a cardiac etiology or vasovagal. After completion of testing- we will have her see Dr. Chowdhury in [place]. Encouraged her to decrease coffee intake and increase intake of hydrating fluids. Given uncontrolled hypertension she should follow sodium restrictions.       Hyperlipidemia  Continue atorvastatin 10 mg once daily  Heart healthy lifestyle encouraged    Recent Labs   Lab Test 07/17/23  7005  "01/16/23  1019   CHOL 133 137   HDL 39* 40*   LDL 62 63   TRIG 159* 171*     Hypertension, uncontrolled  Home blood pressure readings have been normal to lower since syncopal event 2 days ago. She tells me she has \"white coat hypertension.\"   Heart healthy lifestyle encouraged  Consider beta-blocker    BP Readings from Last 6 Encounters:   01/16/24 (!) 150/78   01/02/24 (!) 172/90   10/20/23 (!) 187/94   08/15/23 132/76   07/17/23 (!) 150/92   01/24/23 130/78         Pre-diabetes  Continue management by PCP    Lab Results   Component Value Date    A1C 6.4 07/17/2023    A1C 6.2 01/16/2023    A1C 6.1 07/11/2022       Hypothyroidism  Longstanding history of hypothyroidism. She actually tells me that her first ever syncopal event was in 1978 and was thought to be related to her newly identified hypothyroidism. She had not had syncopal events since this up until recently.   Continue management by PCP  TSH has been low but T4 has been normal       Follow-up with Dr. Chowdhury in [place] next month.       Interval history:  Kayla Lugo is a 72-year old female who presents for cardiology follow-up. She has a history of hypertension, hyperlipidemia. She was initially referred by primary care provider for consult regarding syncopal episode.    Today, Ms. Lugo endorses that two days ago she had another syncopal event. She did not go in for evaluation.     Two days ago- Sunday January 14th, 2023  She was standing and making a pot of coffee.  She felt hot, sweaty, like she was going to faint. She went to sit in a chair at the kitchen table.   She does not think she lost complete consciousness.   Her friend came out to the kitchen and she was limp and leaning towards the edge of the chair, falling.   He would holler at her and she would say \"I'm okay, I'm okay.\"   \"She was ringing wet. Her nightgown was like she was dumped with a bucket of water.\"   He assisted her to getting into the bedroom as he was afraid she was going to fall " "out of the chair. After about 8 feet he could not hold her up and lowered her to the floor. She was sitting on her bottom with her legs out in front of her. She was able to get onto her knees and then into bed. He left her laying in the bed and she slept for about 30 minutes.   About a half hour later she woke up and came out to the living room and stated \"I am fine.\" She changed out of her wet clothes and stayed home for the rested of the day.   When she woke up in the bed after this event she recalls feeling \"normal.\" She did not feel lightheaded, weak, tired. Per her significant other \"she looked a little on the pale side\" after she got up and moving around. He also says that he thought she was more tired for the day following this. When she would get up to do something she would then want to lay down to take a nap.       June 2023 was last time she had a syncopal episode prior to this.   First episode of syncope was in July 2022- went to the ED after passing out, hitting her head.     Thursday, January 18th she did start with some cold symptoms. Coughing. Improving. She did purchase advil col and sinus- took a dose Saturday and then took a zyrtec Saturday evening. She did not take anything prior to event on Sunday morning. She did not have a fever.     She typically was drinking 5-6 cups of coffee daily. Estimates about 2-3 twelve ounce cups of water daily.  Maybe a small cup of milk for dinner.   Eats a light breakfast: yogurt, toast, cereal, oatmeal. Lunch she usually does soup, crackers. Usually eats a hearty dinner with a salad.     She denies any chest discomfort, dyspnea or dyspnea on exertion. She denies any lightheadedness. No LE edema. No palpitations/racing/skipping sensation in her chest.     She does do her own housework- vacuuming, laundry (up and down the stairs with her laundry basket). She tolerates this without chest discomfort, dyspnea, lightheadedness, near-syncope.       Sleeping history: " Sleeps in bed, she does snore. No witness apnea. No orthopnea, PND. Wakes up early 3:30/4 am and feels well rested.     Smoking History: Never smoked    Substance Abuse History: None    Alcohol Abuse History: None    Family History: Father-  at 49 y.o. due to alcohol abuse, mom- passed away at age 81 years old d/t issues with her lungs, son- aged 51 and has had three MIs      HPI:    Kayla Lugo is a 72-year old female who was referred by primary care provider for consult regarding syncopal episode. Mrs. Lugo presented to the local emergency department on 2022 for evaluation of syncopal episode. According to ED documentation she was awake, alert and vitally stable upon arrival to department. Work-up including EKG, troponin, BMP, magnesium, CBC, CXR, head CT were all unremarkable and unrevealing for etiology of syncopal episode. Mrs. Lugo tells me that the day prior to her syncopal episode she had her 20-month grandchild all day. She does not think she had eaten or drank much the day prior due to being busy with her grandchild. It was also a warm day and she had not yet turned on her air conditioning. The morning of the incident she woke up feeling well and was throwing a pile of laundry in front of the basement door down the basement. She denies any chest pain, dyspnea, palpitations, headache prior to syncopal episode.     She has a cardiac history including hypertension, hyperlipidemia. She has a noncardiac history of diabetes mellitus, hypothyroidism, .     Denies chest/intrascapular/arm/neck/jaw discomfort. No sensation of palpitations/fluttering/skipping in chest. No dyspnea, dyspnea on exertion. No lower extremity edema. She enjoys walking and can do this without symptoms (typically walks 4-5 blocks).           RELEVANT TESTING:  Cardiac MR 10/2023 at [facility]  IMPRESSION:   1. The left ventricle is normal in cavity size. The wall thickness is mildly increased at basal septal and  anteroseptal walls with max thickness of 14 mm (sigmoid septum). There is no increased wall thickness in the rest of myocardium. The total LV mass is within normal limits. Global systolic function is normal. There are no regional wall motion abnormalities. The quantitative LV ejection fraction is  67  %.     2. The right ventricle is normal in cavity size, wall thickness, and systolic function. The quantitative RV ejection fraction is  53  %.     3. Delayed enhancement imaging demonstrates no evidence of myocardial infarction, fibrosis or infiltrative disease.     4. Findings are not compatible with hypertrophic obstructive cardiomyopathy in the conventional fashion. There is however sigmoid septum present at maximum thickness of 14 mm, with mildly increased LVOT gradient as per echo report. Clinical correlation is required.       Transthoracic Echocardiogram 7/2023  Interpretation Summary  Global and regional left ventricular function is normal with an EF of 60-65%.  Left ventricular wall thickness is normal. Left ventricular size is normal.  Thickening of the anterobasal septum is present. Left ventricular diastolic  function is indeterminate. No regional wall motion abnormalities are seen.  Right ventricular function, chamber size, wall motion, and thickness are  normal.  The inferior vena cava is normal.  No pericardial effusion is present.    Leadless EKG Monitor 8/2022  Official reading pending.   Review of zio report no tachybrady arrhythmia. No pauses or heart block.      Echocardiogram 8/2022   Interpretation Summary  Global and regional left ventricular function is hyperkinetic with an EF of  65-70%.  Mild thickening of the basal septum, but no doppler evidence of flow  acceleration in the LVOT and no obvious HOWARD of the mitral valve.  Global right ventricular function is normal.  No hemodynamically significant valve abnormalities.  The inferior vena cava is normal.  There is no prior study for direct  comparison.      Past Medical History:   Diagnosis Date    Diabetes mellitus, type 2 (H) 10/22/2014    Eczema 06/09/2011    Esophageal reflux 06/09/2011    Helicobacter pylori (H. pylori) 06/09/2011    history of     Osteoarthrosis, unspecified whether generalized or localized, lower leg 03/15/2001    Yuval Rapp MD     Personal history of other endocrine, metabolic, and immunity disorders 06/09/2011    Unspecified acquired hypothyroidism 06/09/2011    Natalia Starr MD    Unspecified essential hypertension 03/27/2001    Geremias Rouse MD     Unspecified hearing loss, left 06/09/2011       Past Surgical History:   Procedure Laterality Date    ARTHROSCOPY KNEE  2001    RT     COLONOSCOPY  2004    COLONOSCOPY  06/13/2012    Dr. Gorman; 5 year recall given    COLONOSCOPY  07/11/2017    COLONOSCOPY  04/2023    colonoscopy with polypectomy  2007    repeat in five     DILATION AND CURETTAGE  1996    EXCISE LESION LIP Right 09/16/2014    Procedure: EXCISE LESION LIP;  Surgeon: Bri Flores MD;  Location: [facility] OR    ORTHOPEDIC SURGERY Right 10/05/2015    right shoulder arthoscopy RCR    NADIRA with BSO  1997       Allergies   Allergen Reactions    Hydrochlorothiazide Rash    Atenolol Other (See Comments)     Bradycardia     Lisinopril Cough    Norvasc [Amlodipine] Hallucination       Current Outpatient Medications   Medication Sig Dispense Refill    atorvastatin (LIPITOR) 10 MG tablet TAKE 1 TABLET(10 MG) BY MOUTH DAILY 90 tablet 1    blood glucose (ACCU-CHEK SMARTVIEW) test strip Use to test blood sugar one time daily. 100 strip 3    blood glucose calibration (ACCU-CHEK SMARTVIEW CONTROL) solution Use to calibrate blood glucose monitor as directed. 1 Bottle 3    blood glucose monitoring (ACCU-CHEK FASTCLIX) lancets USE TO TEST BLOOD SUGARS ONCE DAILY 102 each 3    blood glucose monitoring (ACCU-CHEK DESTINI SMARTVIEW) meter device kit Use to test blood sugar one time daily. 1 kit 0    levothyroxine  (SYNTHROID/LEVOTHROID) 100 MCG tablet TAKE 1 TABLET(100 MCG) BY MOUTH DAILY 90 tablet 1    metFORMIN (GLUCOPHAGE XR) 500 MG 24 hr tablet TAKE 2 TABLETS(1000 MG) BY MOUTH AT BEDTIME 180 tablet 1    vitamin D3 (CHOLECALCIFEROL) 1000 units (25 mcg) tablet Take 4 tablets by mouth daily.         Social History     Socioeconomic History    Marital status:      Spouse name: Not on file    Number of children: Not on file    Years of education: Not on file    Highest education level: Not on file   Occupational History    Not on file   Tobacco Use    Smoking status: Never    Smokeless tobacco: Never   Vaping Use    Vaping Use: Never used   Substance and Sexual Activity    Alcohol use: No     Alcohol/week: 0.0 standard drinks of alcohol    Drug use: No    Sexual activity: Not Currently   Other Topics Concern     Service Not Asked    Blood Transfusions Yes    Caffeine Concern Yes     Comment: Coffee 3 cups daily     Occupational Exposure Not Asked    Hobby Hazards Not Asked    Sleep Concern Not Asked    Stress Concern Not Asked    Weight Concern Not Asked    Special Diet Not Asked    Back Care Not Asked    Exercise Yes     Comment: Walking     Bike Helmet Not Asked    Seat Belt Not Asked    Self-Exams Not Asked    Parent/sibling w/ CABG, MI or angioplasty before 65F 55M? Not Asked   Social History Narrative    Not on file     Social Determinants of Health     Financial Resource Strain: Not on file   Food Insecurity: Not on file   Transportation Needs: Not on file   Physical Activity: Not on file   Stress: Not on file   Social Connections: Not on file   Interpersonal Safety: Not on file   Housing Stability: Not on file       LAB RESULTS:   Orders placed or performed in visit on 12/11/23    atorvastatin (LIPITOR) 10 MG tablet         Review of systems: Negative except that which was noted in the HPI.    Physical examination:    Vitals: BP (!) 150/78 (BP Location: Left arm, Patient Position: Sitting, Cuff Size:  "Adult Regular)   Ht 1.651 m (5' 5\")   Wt 85 kg (187 lb 6.4 oz)   SpO2 98%   BMI 31.18 kg/m    BMI= Body mass index is 31.18 kg/m .      GENERAL APPEARANCE: healthy, alert and no distress  HEENT: no icterus, no xanthelasmas, normal pupil size and reaction, no cyanosis.  NECK: no adenopathy, no asymmetry, masses.  CHEST: lungs clear to auscultation - no rales, rhonchi or wheezes, no use of accessory muscles, no retractions, respirations are unlabored, normal respiratory rate  CARDIOVASCULAR: regular rhythm, normal S1 with physiologic split S2, no S3 or S4 and no murmur, click or rub  EXTREMITIES: no clubbing, cyanosis or edema  NEURO: alert and oriented normal speech, and affect  VASC: No vascular bruits heard.  SKIN: no ecchymoses, no rashes        Thank you for allowing me to participate in the care of your patient. Please do not hesitate to contact me if you have any questions.       Nova Augustine, CNP    "

## 2024-01-16 NOTE — PATIENT INSTRUCTIONS
Thank you for allowing Nova Augustine CNP and our  team to participate in your care. Please call our office at [phone] with scheduling questions or if you need to cancel or change your appointment. With any other questions or concerns you may call cardiology nurse at  [phone].       If you experience chest pain, chest pressure, chest tightness, shortness of breath, fainting, lightheadedness, nausea, vomiting, or other concerning symptoms, please report to the Emergency Department or call 911. These symptoms may be emergent, and best treated in the Emergency Department.

## 2024-01-16 NOTE — TELEPHONE ENCOUNTER
Has a fainting spell. Is also asking about her results from her Cardiac MRI in Oct. Awaiting message back from FLOYD Augustine about fitting patient in today.

## 2024-01-17 DIAGNOSIS — E03.9 HYPOTHYROIDISM, UNSPECIFIED TYPE: ICD-10-CM

## 2024-01-17 RX ORDER — LEVOTHYROXINE SODIUM 88 UG/1
88 TABLET ORAL DAILY
Qty: 90 TABLET | Refills: 1 | Status: SHIPPED | OUTPATIENT
Start: 2024-01-17 | End: 2024-07-09

## 2024-01-18 ENCOUNTER — HOSPITAL ENCOUNTER (OUTPATIENT)
Dept: CARDIOLOGY | Facility: HOSPITAL | Age: 74
Discharge: HOME | End: 2024-01-18
Attending: NURSE PRACTITIONER
Payer: COMMERCIAL

## 2024-01-18 DIAGNOSIS — I42.2 HYPERTROPHIC CARDIOMYOPATHY (H): ICD-10-CM

## 2024-01-18 DIAGNOSIS — R55 RECURRENT SYNCOPE: ICD-10-CM

## 2024-01-18 LAB
BI-PLANE LVEF ECHO: NORMAL
LVEF ECHO: NORMAL

## 2024-01-18 PROCEDURE — 93306 TTE W/DOPPLER COMPLETE: CPT

## 2024-01-18 PROCEDURE — 999N000096 CARDIAC MOBILE TELEMETRY MONITOR

## 2024-01-18 PROCEDURE — 93306 TTE W/DOPPLER COMPLETE: CPT | Mod: 26 | Performed by: STUDENT IN AN ORGANIZED HEALTH CARE EDUCATION/TRAINING PROGRAM

## 2024-01-18 NOTE — PATIENT INSTRUCTIONS
MCOT was placed on patient during outpatient appointment today. Patient was instructed to wear monitor for 30 days. Skin was prepped and monitor was placed following [employer] guidelines.    Patient was instructed on monitor charging, replacement of monitor tape, and usage/charging of cell phone. Patient has been instructed to take monitor off prior to showering. If skin irritation occurs patient was instructed to remove patch and contact [employer].    Patient was informed MD will contact them within a week of receiving results. Staff reminded patient of outside billing notice which was explained to patient during the scheduling process of this monitor. Patient also instructed to contact [email or web address] with any questions [phone].    Patient had no further questions and agreed with plan. Patient was sent home with the all supplies in box, instructions and cell phone.

## 2024-01-18 NOTE — PROGRESS NOTES
"  Assessment & Plan     1. Type 2 diabetes mellitus without complication, without long-term current use of insulin (H)  No changes to current medication, will refill when due.  Follow-up in six months, sooner as needed.    2. Mixed hyperlipidemia  As above    3. Essential hypertension  As above    4. Hypothyroidism, unspecified type  No changes    5. Vaginal atrophy  Will try vaginal estrogen, follow-up if no improvement noted.  - conjugated estrogens (PREMARIN) 0.625 MG/GM vaginal cream; Place 1 g vaginally twice a week  Dispense: 30 g; Refill: 2         BMI  Estimated body mass index is 29.45 kg/m  as calculated from the following:    Height as of 1/16/24: 1.651 m (5' 5\").    Weight as of this encounter: 80.3 kg (177 lb).       Return in about 6 months (around 7/19/2024) for Diabetic Follow-up, Chronic Disease Management, Medication review.        Irina Garza is a 73 year old, presenting for the following health issues:  Diabetes    HPI     Diabetes Follow-up    How often are you checking your blood sugar? One time daily  What time of day are you checking your blood sugars (select all that apply)?   Mornings  Have you had any blood sugars above 200?  No  Have you had any blood sugars below 70?  No  What symptoms do you notice when your blood sugar is low?  Shaky  What concerns do you have today about your diabetes? None   Do you have any of these symptoms? (Select all that apply)  No numbness or tingling in feet.  No redness, sores or blisters on feet.  No complaints of excessive thirst.  No reports of blurry vision.  No significant changes to weight.      BP Readings from Last 2 Encounters:   01/19/24 (!) 146/80   01/16/24 (!) 150/78     Hemoglobin A1C (%)   Date Value   01/16/2024 6.0 (H)   07/17/2023 6.4 (H)   06/29/2021 5.8 (H)   12/01/2020 5.8 (H)     LDL Cholesterol Calculated (mg/dL)   Date Value   07/17/2023 62   01/16/2023 63   06/29/2021 71   12/01/2020 71             Hyperlipidemia " Follow-Up    Are you regularly taking any medication or supplement to lower your cholesterol?   Yes- Lipitor  Are you having muscle aches or other side effects that you think could be caused by your cholesterol lowering medication?  No    Hypertension Follow-up    Do you check your blood pressure regularly outside of the clinic? Yes   Are you following a low salt diet? Yes  Are your blood pressures ever more than 140 on the top number (systolic) OR more   than 90 on the bottom number (diastolic), for example 140/90? Yes    Hypothyroidism Follow-up    Since last visit, patient describes the following symptoms: Weight stable, no hair loss, no skin changes, no constipation, no loose stools, dry skin, constipation, loose stools, anxiety, fatigue, and hair loss    Patient notes she is in a new relationship, and intercourse is difficult and uncomfortable.    How many servings of fruits and vegetables do you eat daily?  0-1  On average, how many sweetened beverages do you drink each day (Examples: soda, juice, sweet tea, etc.  Do NOT count diet or artificially sweetened beverages)?   0  How many days per week do you exercise enough to make your heart beat faster? 3 or less  How many minutes a day do you exercise enough to make your heart beat faster? 9 or less  How many days per week do you miss taking your medication? 0          Objective    BP (!) 146/80 (BP Location: Left arm, Patient Position: Sitting, Cuff Size: Adult Regular)   Pulse 66   Temp 97.8  F (36.6  C) (Tympanic)   Resp 16   Wt 80.3 kg (177 lb)   SpO2 99%   Breastfeeding No   BMI 29.45 kg/m    Body mass index is 29.45 kg/m .    Review of Systems  Constitutional, HEENT, cardiovascular, pulmonary, gi and gu systems are negative, except as otherwise noted.  Physical Exam   GENERAL: alert and no distress  PSYCH: mentation appears normal, affect normal/bright    Labs were recently updated through Cardiology, results reviewed with patient        Signed  Electronically by: Natalia Starr MD

## 2024-01-19 ENCOUNTER — OFFICE VISIT (OUTPATIENT)
Dept: FAMILY MEDICINE | Facility: OTHER | Age: 74
End: 2024-01-19
Attending: FAMILY MEDICINE
Payer: COMMERCIAL

## 2024-01-19 VITALS
OXYGEN SATURATION: 99 % | SYSTOLIC BLOOD PRESSURE: 146 MMHG | RESPIRATION RATE: 16 BRPM | DIASTOLIC BLOOD PRESSURE: 80 MMHG | WEIGHT: 177 LBS | HEART RATE: 66 BPM | BODY MASS INDEX: 29.45 KG/M2 | TEMPERATURE: 97.8 F

## 2024-01-19 DIAGNOSIS — E78.2 MIXED HYPERLIPIDEMIA: ICD-10-CM

## 2024-01-19 DIAGNOSIS — E03.9 HYPOTHYROIDISM, UNSPECIFIED TYPE: ICD-10-CM

## 2024-01-19 DIAGNOSIS — N95.2 VAGINAL ATROPHY: ICD-10-CM

## 2024-01-19 DIAGNOSIS — I10 ESSENTIAL HYPERTENSION: ICD-10-CM

## 2024-01-19 DIAGNOSIS — E11.9 TYPE 2 DIABETES MELLITUS WITHOUT COMPLICATION, WITHOUT LONG-TERM CURRENT USE OF INSULIN (H): Primary | ICD-10-CM

## 2024-01-19 PROCEDURE — G0463 HOSPITAL OUTPT CLINIC VISIT: HCPCS

## 2024-01-19 PROCEDURE — 99214 OFFICE O/P EST MOD 30 MIN: CPT | Performed by: FAMILY MEDICINE

## 2024-01-19 ASSESSMENT — PAIN SCALES - GENERAL: PAINLEVEL: NO PAIN (0)

## 2024-01-19 NOTE — NURSING NOTE
"Chief Complaint   Patient presents with     Otalgia     Sinus Problem       Initial BP (!) 198/88 (BP Location: Right arm, Patient Position: Chair, Cuff Size: Adult Large)   Pulse 103   Temp 98.6  F (37  C) (Tympanic)   Ht 1.651 m (5' 5\")   Wt 87.5 kg (193 lb)   SpO2 96%   BMI 32.12 kg/m   Estimated body mass index is 32.12 kg/m  as calculated from the following:    Height as of this encounter: 1.651 m (5' 5\").    Weight as of this encounter: 87.5 kg (193 lb).  Medication Reconciliation: complete  Zayra Seymour LPN    "
Yes

## 2024-01-23 ENCOUNTER — TELEPHONE (OUTPATIENT)
Dept: CARDIOLOGY | Facility: OTHER | Age: 74
End: 2024-01-23

## 2024-01-23 NOTE — TELEPHONE ENCOUNTER
Patient calling inquiring about echo results.   Regarding the MCOT, she is asking if ok to take of periodically (an hour or 2 at a time) on vacation to go into the Hot tub in [place]. Do you have any thoughts about being in a hot tub with the heat and issues with dehydration. Leaving 01/28 returning 2/1  Patient requests a [employer] message to reply.

## 2024-02-05 DIAGNOSIS — E11.9 TYPE 2 DIABETES MELLITUS WITHOUT COMPLICATION, WITHOUT LONG-TERM CURRENT USE OF INSULIN (H): ICD-10-CM

## 2024-02-05 RX ORDER — METFORMIN HCL 500 MG
TABLET, EXTENDED RELEASE 24 HR ORAL
Qty: 180 TABLET | Refills: 1 | Status: SHIPPED | OUTPATIENT
Start: 2024-02-05 | End: 2024-07-16

## 2024-02-05 NOTE — TELEPHONE ENCOUNTER
Metformin  Last Written Prescription Date: 8/14/23  Last Fill Quantity: 180 # of Refills: 1  Last Office Visit: 1/19/24

## 2024-02-12 NOTE — PROGRESS NOTES
[facility]        HPI: Ms. Kayla Lugo is a 73 year old  female with PMH significant for    -Thickening of the anterobasal septum suspicious for HCM  -Recurrent syncope  -[place] hypertension    Patient is being seen today for recurrent syncopal episodes since October 2022.  Patient tells me that she has passed out 3 times so far.  All these 3 events occurred very early in the morning.  All of these syncopal episodes have prodromal symptoms including feeling hot and sweaty.  In October 2022 she used the bathroom, got out and felt hot, flushed, sweaty, loss of consciousness and hit her head.  She did end up hitting her face on the vanity and had some bruises.  The second episode occurred in June 2023.  She woke up in the morning, got up, used the bathroom (micturition) and when she got out she felt sweaty walking to the kitchen.  She passed out in front of the pantry.  She did not injure herself.  Last episode occurred last month in January 2024.  Her boyfriend witnessed the event.  She got up in the morning trying to make coffee in the kitchen.  She felt warm, flushed and sat on the chair.  Her boyfriend tells me that she was very pale, sweaty.  Patient denies feeling palpitations, chest pain or shortness of breath prior to any of these events.  Her boyfriend helped her to go to bed.  She woke up and she was able to continue her daily without any limitations.  Since then she has been doing well.  She has no cardiac symptoms.  She is physically active.  Does not smoke.  No alcohol abuse.  No new medications.  She is on metformin and levothyroxine.    She is currently wearing a heart monitor for a month.  Cardiac MRI October 2023 showed mild thickening of the anterobasal septum otherwise unremarkable and not consistent with HCM.  Recent transthoracic echocardiogram on 1/18 showed normal  biventricular function with no significant valve disease.  Again thickening of the anterobasal septum was noted.    She has high blood pressure in clinic.  She has been monitoring her blood pressure at home and tells me that her blood pressure is usually 127/67 mmHg.  She has tried blood pressure medications in the past but ended up with some side effects.  Diabetes is well-controlled.    Medications, personal, family, and social history reviewed with patient and revised.    PAST MEDICAL HISTORY:  Past Medical History:   Diagnosis Date    Diabetes mellitus, type 2 (H) 10/22/2014    Eczema 06/09/2011    Esophageal reflux 06/09/2011    Helicobacter pylori (H. pylori) 06/09/2011    history of     Osteoarthrosis, unspecified whether generalized or localized, lower leg 03/15/2001    Yuval Rapp MD     Personal history of other endocrine, metabolic, and immunity disorders 06/09/2011    Unspecified acquired hypothyroidism 06/09/2011    Natalia Starr MD    Unspecified essential hypertension 03/27/2001    Geremias Rouse MD     Unspecified hearing loss, left 06/09/2011       CURRENT MEDICATIONS:  Current Outpatient Medications   Medication Sig Dispense Refill    atorvastatin (LIPITOR) 10 MG tablet TAKE 1 TABLET(10 MG) BY MOUTH DAILY 90 tablet 1    blood glucose (ACCU-CHEK SMARTVIEW) test strip Use to test blood sugar one time daily. 100 strip 3    blood glucose calibration (ACCU-CHEK SMARTVIEW CONTROL) solution Use to calibrate blood glucose monitor as directed. 1 Bottle 3    blood glucose monitoring (ACCU-CHEK FASTCLIX) lancets USE TO TEST BLOOD SUGARS ONCE DAILY 102 each 3    blood glucose monitoring (ACCU-CHEK DESTINI SMARTVIEW) meter device kit Use to test blood sugar one time daily. 1 kit 0    conjugated estrogens (PREMARIN) 0.625 MG/GM vaginal cream Place 1 g vaginally twice a week 30 g 2    levothyroxine (SYNTHROID/LEVOTHROID) 88 MCG tablet Take 1 tablet (88 mcg) by mouth daily 90 tablet 1    metFORMIN (GLUCOPHAGE  "XR) 500 MG 24 hr tablet TAKE 2 TABLETS(1000 MG) BY MOUTH AT BEDTIME 180 tablet 1    vitamin D3 (CHOLECALCIFEROL) 1000 units (25 mcg) tablet Take 4 tablets by mouth daily.         PAST SURGICAL HISTORY:  Past Surgical History:   Procedure Laterality Date    ARTHROSCOPY KNEE  2001    RT     COLONOSCOPY  2004    COLONOSCOPY  06/13/2012    Dr. Gorman; 5 year recall given    COLONOSCOPY  07/11/2017    COLONOSCOPY  04/2023    colonoscopy with polypectomy  2007    repeat in five     DILATION AND CURETTAGE  1996    EXCISE LESION LIP Right 09/16/2014    Procedure: EXCISE LESION LIP;  Surgeon: Bri Flores MD;  Location: [facility]    ORTHOPEDIC SURGERY Right 10/05/2015    right shoulder arthoscopy RCR    NADIRA with BSO  1997       ALLERGIES:     Allergies   Allergen Reactions    Hydrochlorothiazide Rash    Atenolol Other (See Comments)     Bradycardia     Lisinopril Cough    Norvasc [Amlodipine] Hallucination       FAMILY HISTORY:  Family History   Problem Relation Age of Onset    Alcohol/Drug Father         alcoholism    C.A.D. Father         (cause of death)     Cancer Mother         basal cell    Ovarian Cancer Mother     Alcohol/Drug Son         alcoholism     C.A.D. Son     Diabetes Brother     Diabetes Maternal Grandmother          SOCIAL HISTORY:  Social History     Tobacco Use    Smoking status: Never     Passive exposure: Never    Smokeless tobacco: Never   Vaping Use    Vaping Use: Never used   Substance Use Topics    Alcohol use: No     Alcohol/week: 0.0 standard drinks of alcohol    Drug use: No       ROS:   Constitutional: No fever, chills, or sweats. Weight stable.   Cardiovascular: As per HPI.       Exam:  BP (!) 150/82   Pulse 73   Temp 97.9  F (36.6  C) (Tympanic)   Resp 16   Ht 1.651 m (5' 5\")   Wt 83.5 kg (184 lb 1.6 oz)   SpO2 97%   BMI 30.64 kg/m    GENERAL APPEARANCE: alert and no distress  HEENT: no icterus, no central cyanosis  LYMPH/NECK: no adenopathy, no asymmetry, JVP not " elevated  RESPIRATORY: lungs clear to auscultation - no rales, rhonchi or wheezes, no use of accessory muscles, no retractions, respirations are unlabored, normal respiratory rate  CARDIOVASCULAR: regular rhythm, normal S1, S2, no S3 or S4 and no murmur, click or rub, precordium quiet with normal PMI.  GI: soft, non tender  EXTREMITIES: no edema  NEURO: alert, normal speech,and affect  SKIN: no ecchymoses, no rashes     I have reviewed the labs and personally reviewed the imaging below and made my comment in the assessment and plan.    Labs:  CBC RESULTS:   Lab Results   Component Value Date    WBC 5.1 01/16/2024    WBC 6.6 09/25/2015    RBC 5.08 01/16/2024    RBC 5.06 09/25/2015    HGB 14.2 01/16/2024    HGB 14.3 09/25/2015    HCT 43.8 01/16/2024    HCT 43.7 09/25/2015    MCV 86 01/16/2024    MCV 86 09/25/2015    MCH 28.0 01/16/2024    MCH 28.3 09/25/2015    MCHC 32.4 01/16/2024    MCHC 32.7 09/25/2015    RDW 13.2 01/16/2024    RDW 13.4 09/25/2015     01/16/2024     09/25/2015       BMP RESULTS:  Lab Results   Component Value Date     01/16/2024     06/29/2021    POTASSIUM 4.4 01/16/2024    POTASSIUM 4.1 07/30/2022    POTASSIUM 4.4 06/29/2021    CHLORIDE 102 01/16/2024    CHLORIDE 106 07/30/2022    CHLORIDE 104 06/29/2021    CO2 26 01/16/2024    CO2 27 07/30/2022    CO2 28 06/29/2021    ANIONGAP 11 01/16/2024    ANIONGAP 4 07/30/2022    ANIONGAP 5 06/29/2021    GLC 99 01/16/2024     (H) 07/30/2022     (H) 06/29/2021    BUN 15.9 01/16/2024    BUN 20 07/30/2022    BUN 16 06/29/2021    CR 0.79 01/16/2024    CR 0.80 06/29/2021    GFRESTIMATED 79 01/16/2024    GFRESTIMATED 74 06/29/2021    GFRESTBLACK 86 06/29/2021    KIRSTEN 9.6 01/16/2024    KIRSTEN 9.6 06/29/2021   Transthoracic echocardiogram 1/18/2024:  Global and regional left ventricular function is hyperkinetic with an EF of  65-70%.Thickening of the anterobasal septum is present.  The right ventricle is normal size.Global right  ventricular function is normal.  IVC diameter <2.1 cm collapsing >50% with sniff suggests a normal RA pressure  of 3 mmHg.  No pericardial effusion is present.  There has been no change  Cardiac MR 10/2023 at [facility]  IMPRESSION:   1. The left ventricle is normal in cavity size. The wall thickness is mildly increased at basal septal and anteroseptal walls with max thickness of 14 mm (sigmoid septum). There is no increased wall thickness in the rest of myocardium. The total LV mass is within normal limits. Global systolic function is normal. There are no regional wall motion abnormalities. The quantitative LV ejection fraction is  67  %.     2. The right ventricle is normal in cavity size, wall thickness, and systolic function. The quantitative RV ejection fraction is  53  %.     3. Delayed enhancement imaging demonstrates no evidence of myocardial infarction, fibrosis or infiltrative disease.     4. Findings are not compatible with hypertrophic obstructive cardiomyopathy in the conventional fashion. There is however sigmoid septum present at maximum thickness of 14 mm, with mildly increased LVOT gradient as per echo report. Clinical correlation is required.         Transthoracic Echocardiogram 7/2023  Interpretation Summary  Global and regional left ventricular function is normal with an EF of 60-65%.  Left ventricular wall thickness is normal. Left ventricular size is normal.  Thickening of the anterobasal septum is present. Left ventricular diastolic  function is indeterminate. No regional wall motion abnormalities are seen.  Right ventricular function, chamber size, wall motion, and thickness are  normal.  The inferior vena cava is normal.  No pericardial effusion is present.     Leadless EKG Monitor 8/2022  Review of zio report no tachybrady arrhythmia. No pauses or heart block.       Echocardiogram 8/2022   Interpretation Summary  Global and regional left ventricular function is hyperkinetic with an EF  of  65-70%.  Mild thickening of the basal septum, but no doppler evidence of flow  acceleration in the LVOT and no obvious HOWARD of the mitral valve.  Global right ventricular function is normal.  No hemodynamically significant valve abnormalities.  The inferior vena cava is normal.  There is no prior study for direct comparison.    Assessment and Plan:     # Vasovagal syncope  -I think patient's syncopal episodes are typical of vasovagal syncope given 3 of these syncopal episodes occurred when she was upright, in the morning and with typical prodromal symptoms including sweating and hot and flushed feeling.  I discussed the benign nature of vasovagal faint.  I think biggest trigger in her case is likely dehydration.  She tells me that she does not drink enough water.  She is also restricting salt significantly.    Recommendations:  1.  Hydration with 60 to 80 ounces of water  2.  Start hydration first thing in the morning with big glass of water  3.  If you feel lightheaded, dizzy, clammy sit down or lie down and elevate your legs  4.  If you end up fainting and injuring yourself go to emergency room  5.  Avoid hot tub, very hot shower and being outside when it is too hot  6.  Drink electrolyte drinks like liquid IV or Gatorade  7.  Do not be too restrictive on salt in your diet  8.  Remember to monitor blood pressure at home and normal blood pressures is less than 130/80 mmHg    Return to clinic 6 months.    Total time spent today for this visit is 77 minutes including precharting, face-to-face clinic visit, review of labs/imaging and medical documentation.    Ajay SAN MD  [facility] Division of Cardiology  Pager [phone]

## 2024-02-13 ENCOUNTER — OFFICE VISIT (OUTPATIENT)
Dept: CARDIOLOGY | Facility: OTHER | Age: 74
End: 2024-02-13
Attending: INTERNAL MEDICINE
Payer: COMMERCIAL

## 2024-02-13 VITALS
SYSTOLIC BLOOD PRESSURE: 150 MMHG | HEIGHT: 65 IN | HEART RATE: 73 BPM | TEMPERATURE: 97.9 F | DIASTOLIC BLOOD PRESSURE: 82 MMHG | WEIGHT: 184.1 LBS | OXYGEN SATURATION: 97 % | RESPIRATION RATE: 16 BRPM | BODY MASS INDEX: 30.67 KG/M2

## 2024-02-13 DIAGNOSIS — R55 VASOVAGAL SYNCOPE: Primary | ICD-10-CM

## 2024-02-13 PROCEDURE — 99417 PROLNG OP E/M EACH 15 MIN: CPT | Performed by: INTERNAL MEDICINE

## 2024-02-13 PROCEDURE — G0463 HOSPITAL OUTPT CLINIC VISIT: HCPCS

## 2024-02-13 PROCEDURE — 99215 OFFICE O/P EST HI 40 MIN: CPT | Performed by: INTERNAL MEDICINE

## 2024-02-13 ASSESSMENT — PAIN SCALES - GENERAL: PAINLEVEL: NO PAIN (0)

## 2024-02-13 NOTE — PATIENT INSTRUCTIONS
Thank you for allowing Dr. Chowdhury and our  team to participate in your care. Please call our office at [phone] with scheduling questions or if you need to cancel or change your appointment. With any other questions or concerns you may call cardiology nurse at [phone].    You have vasovagal syncope or in other terms that is common faint    If you experience chest pain, chest pressure, chest tightness, shortness of breath, fainting, lightheadedness, nausea, vomiting, or other concerning symptoms, please report to the Emergency Department or call 911. These symptoms may be emergent, and best treated in the Emergency Department.    Follow up in 6 months.  Recommendations:  1.  Hydration with 60 to 80 ounces of water  2.  Start hydration first thing in the morning with big glass of water  3.  If you feel lightheaded, dizzy, clammy sit down or lie down and elevate your legs  4.  If you end up fainting and injuring yourself go to emergency room  5.  Avoid hot tub, very hot shower and being outside when it is too hot  6.  Drink electrolyte drinks like liquid IV or Gatorade  7.  Do not be too restrictive on salt in your diet  8.  Remember to monitor blood pressure at home and normal blood pressures is less than 130/80 mmHg

## 2024-02-26 PROCEDURE — 93228 REMOTE 30 DAY ECG REV/REPORT: CPT | Performed by: INTERNAL MEDICINE

## 2024-05-23 ENCOUNTER — TRANSFERRED RECORDS (OUTPATIENT)
Dept: HEALTH INFORMATION MANAGEMENT | Facility: CLINIC | Age: 74
End: 2024-05-23

## 2024-06-07 DIAGNOSIS — E78.5 HYPERLIPIDEMIA, UNSPECIFIED HYPERLIPIDEMIA TYPE: ICD-10-CM

## 2024-06-07 RX ORDER — ATORVASTATIN CALCIUM 10 MG/1
TABLET, FILM COATED ORAL
Qty: 90 TABLET | Refills: 1 | Status: SHIPPED | OUTPATIENT
Start: 2024-06-07

## 2024-06-07 NOTE — TELEPHONE ENCOUNTER
Lipitor      Last Written Prescription Date:  12/12/23  Last Fill Quantity: 90,   # refills: 1  Last Office Visit: 1/19/24  Future Office visit:    Next 5 appointments (look out 90 days)      Jul 16, 2024  1:30 PM  (Arrive by 1:15 PM)  Provider Visit with Natalia Starr MD  [facility] ([facility] ) [address] [address]  [phone]     Aug 13, 2024  1:30 PM  (Arrive by 1:15 PM)  Return Visit with Ajay Chowdhury MD  [facility] [place] ([facility] [place] ) [address]  [place] [address]  [phone]             Routing refill request to provider for review/approval because:

## 2024-07-06 ENCOUNTER — HEALTH MAINTENANCE LETTER (OUTPATIENT)
Age: 74
End: 2024-07-06

## 2024-07-09 DIAGNOSIS — E03.9 HYPOTHYROIDISM, UNSPECIFIED TYPE: ICD-10-CM

## 2024-07-09 RX ORDER — LEVOTHYROXINE SODIUM 88 UG/1
88 TABLET ORAL DAILY
Qty: 90 TABLET | Refills: 1 | Status: SHIPPED | OUTPATIENT
Start: 2024-07-09

## 2024-07-09 NOTE — TELEPHONE ENCOUNTER
Disp Refills Start End MALINI   levothyroxine (SYNTHROID/LEVOTHROID) 88 MCG tablet 90 tablet 1 1/17/2024 -- No     Last Office Visit: 01/16/2024  Future Office visit:    Next 5 appointments (look out 90 days)      Jul 16, 2024 1:30 PM  (Arrive by 1:15 PM)  Provider Visit with Natalia Starr MD  [facility] ([facility] ) [address] [address]  [phone]     Aug 13, 2024 1:30 PM  (Arrive by 1:15 PM)  Return Visit with Ajay Chowdhury MD  [facility] [place] ([facility] [place] ) [address]  [place] [address]  [phone]             Routing refill request to provider for review/approval because:

## 2024-07-16 ENCOUNTER — OFFICE VISIT (OUTPATIENT)
Dept: FAMILY MEDICINE | Facility: OTHER | Age: 74
End: 2024-07-16
Attending: FAMILY MEDICINE
Payer: COMMERCIAL

## 2024-07-16 VITALS
RESPIRATION RATE: 16 BRPM | WEIGHT: 193.1 LBS | BODY MASS INDEX: 32.17 KG/M2 | HEIGHT: 65 IN | SYSTOLIC BLOOD PRESSURE: 138 MMHG | OXYGEN SATURATION: 99 % | HEART RATE: 85 BPM | DIASTOLIC BLOOD PRESSURE: 76 MMHG | TEMPERATURE: 97.4 F

## 2024-07-16 DIAGNOSIS — E11.9 TYPE 2 DIABETES MELLITUS WITHOUT COMPLICATION, WITHOUT LONG-TERM CURRENT USE OF INSULIN (H): ICD-10-CM

## 2024-07-16 DIAGNOSIS — E78.2 MIXED HYPERLIPIDEMIA: ICD-10-CM

## 2024-07-16 DIAGNOSIS — Z12.31 ENCOUNTER FOR SCREENING MAMMOGRAM FOR BREAST CANCER: ICD-10-CM

## 2024-07-16 DIAGNOSIS — I10 ESSENTIAL HYPERTENSION: ICD-10-CM

## 2024-07-16 DIAGNOSIS — E03.9 HYPOTHYROIDISM, UNSPECIFIED TYPE: ICD-10-CM

## 2024-07-16 DIAGNOSIS — E11.9 TYPE 2 DIABETES MELLITUS WITHOUT COMPLICATION, WITHOUT LONG-TERM CURRENT USE OF INSULIN (H): Primary | ICD-10-CM

## 2024-07-16 LAB
ALT SERPL W P-5'-P-CCNC: 17 U/L (ref 0–50)
ANION GAP SERPL CALCULATED.3IONS-SCNC: 15 MMOL/L (ref 7–15)
BUN SERPL-MCNC: 13.8 MG/DL (ref 8–23)
CALCIUM SERPL-MCNC: 10.3 MG/DL (ref 8.8–10.4)
CHLORIDE SERPL-SCNC: 102 MMOL/L (ref 98–107)
CHOLEST SERPL-MCNC: 150 MG/DL
CREAT SERPL-MCNC: 0.9 MG/DL (ref 0.51–0.95)
CREAT UR-MCNC: 43.5 MG/DL
EGFRCR SERPLBLD CKD-EPI 2021: 67 ML/MIN/1.73M2
EST. AVERAGE GLUCOSE BLD GHB EST-MCNC: 137 MG/DL
FASTING STATUS PATIENT QL REPORTED: NO
FASTING STATUS PATIENT QL REPORTED: NO
GLUCOSE SERPL-MCNC: 155 MG/DL (ref 70–99)
HBA1C MFR BLD: 6.4 %
HCO3 SERPL-SCNC: 24 MMOL/L (ref 22–29)
HDLC SERPL-MCNC: 38 MG/DL
LDLC SERPL CALC-MCNC: 46 MG/DL
MICROALBUMIN UR-MCNC: <12 MG/L
MICROALBUMIN/CREAT UR: NORMAL MG/G{CREAT}
NONHDLC SERPL-MCNC: 112 MG/DL
POTASSIUM SERPL-SCNC: 4 MMOL/L (ref 3.4–5.3)
SODIUM SERPL-SCNC: 141 MMOL/L (ref 135–145)
TRIGL SERPL-MCNC: 331 MG/DL
TSH SERPL DL<=0.005 MIU/L-ACNC: 1.28 UIU/ML (ref 0.3–4.2)

## 2024-07-16 PROCEDURE — 82043 UR ALBUMIN QUANTITATIVE: CPT | Mod: ZL | Performed by: FAMILY MEDICINE

## 2024-07-16 PROCEDURE — 83036 HEMOGLOBIN GLYCOSYLATED A1C: CPT | Mod: ZL | Performed by: FAMILY MEDICINE

## 2024-07-16 PROCEDURE — 84460 ALANINE AMINO (ALT) (SGPT): CPT | Mod: ZL | Performed by: FAMILY MEDICINE

## 2024-07-16 PROCEDURE — G2211 COMPLEX E/M VISIT ADD ON: HCPCS | Performed by: FAMILY MEDICINE

## 2024-07-16 PROCEDURE — 36415 COLL VENOUS BLD VENIPUNCTURE: CPT | Mod: ZL | Performed by: FAMILY MEDICINE

## 2024-07-16 PROCEDURE — G0463 HOSPITAL OUTPT CLINIC VISIT: HCPCS

## 2024-07-16 PROCEDURE — 80048 BASIC METABOLIC PNL TOTAL CA: CPT | Mod: ZL | Performed by: FAMILY MEDICINE

## 2024-07-16 PROCEDURE — 99214 OFFICE O/P EST MOD 30 MIN: CPT | Performed by: FAMILY MEDICINE

## 2024-07-16 PROCEDURE — 80061 LIPID PANEL: CPT | Mod: ZL | Performed by: FAMILY MEDICINE

## 2024-07-16 PROCEDURE — 84443 ASSAY THYROID STIM HORMONE: CPT | Mod: ZL | Performed by: FAMILY MEDICINE

## 2024-07-16 RX ORDER — METFORMIN HCL 500 MG
1000 TABLET, EXTENDED RELEASE 24 HR ORAL 2 TIMES DAILY WITH MEALS
Qty: 180 TABLET | Refills: 1 | Status: SHIPPED | OUTPATIENT
Start: 2024-07-16

## 2024-07-16 RX ORDER — METFORMIN HCL 500 MG
1000 TABLET, EXTENDED RELEASE 24 HR ORAL 2 TIMES DAILY WITH MEALS
Qty: 360 TABLET | OUTPATIENT
Start: 2024-07-16

## 2024-07-16 ASSESSMENT — PAIN SCALES - GENERAL: PAINLEVEL: NO PAIN (0)

## 2024-07-16 NOTE — PROGRESS NOTES
"  Assessment & Plan     1. Type 2 diabetes mellitus without complication, without long-term current use of insulin (H)  Labs updated, medication refilled.  Follow-up in six months, sooner as needed.  - Albumin Random Urine Quantitative with Creat Ratio; Future  - HEMOGLOBIN A1C; Future  - metFORMIN (GLUCOPHAGE XR) 500 MG 24 hr tablet; Take 2 tablets (1,000 mg) by mouth 2 times daily (with meals)  Dispense: 180 tablet; Refill: 1  - HEMOGLOBIN A1C  - Albumin Random Urine Quantitative with Creat Ratio    2. Mixed hyperlipidemia  As above  - Lipid Profile; Future  - ALT; Future  - Lipid Profile  - ALT    3. Essential hypertension  As above  - Basic metabolic panel; Future  - Basic metabolic panel    4. Hypothyroidism, unspecified type  As above  - TSH with free T4 reflex; Future  - TSH with free T4 reflex    5. Encounter for screening mammogram for breast cancer  Ordered  - MA Screen Bilateral w/Wilson; Future     The longitudinal plan of care for the diagnosis(es)/condition(s) as documented were addressed during this visit. Due to the added complexity in care, I will continue to support Kayla in the subsequent management and with ongoing continuity of care.        BMI  Estimated body mass index is 32.13 kg/m  as calculated from the following:    Height as of this encounter: 1.651 m (5' 5\").    Weight as of this encounter: 87.6 kg (193 lb 1.6 oz).   Weight management plan: Discussed healthy diet and exercise guidelines      Return in about 6 months (around 1/16/2025) for Diabetic Follow-up, Chronic Disease Management, Medication review.      Irina Garza is a 74 year old, presenting for the following health issues:  Diabetes, Lipids, and Thyroid Problem    HPI     Diabetes Follow-up    How often are you checking your blood sugar? A few times a month  What time of day are you checking your blood sugars (select all that apply)?  Before and after meals  Have you had any blood sugars above 200?  No  Have you had any " "blood sugars below 70?  No  What symptoms do you notice when your blood sugar is low?  Shaky  What concerns do you have today about your diabetes? None   Do you have any of these symptoms? (Select all that apply)  No numbness or tingling in feet.  No redness, sores or blisters on feet.  No complaints of excessive thirst.  No reports of blurry vision.  No significant changes to weight.      BP Readings from Last 2 Encounters:   07/16/24 138/76   02/13/24 (!) 150/82     Hemoglobin A1C (%)   Date Value   07/16/2024 6.4 (H)   01/16/2024 6.0 (H)   06/29/2021 5.8 (H)   12/01/2020 5.8 (H)     LDL Cholesterol Calculated (mg/dL)   Date Value   07/16/2024 46   07/17/2023 62   06/29/2021 71   12/01/2020 71             Hyperlipidemia Follow-Up    Are you regularly taking any medication or supplement to lower your cholesterol?   Yes- Lipitor  Are you having muscle aches or other side effects that you think could be caused by your cholesterol lowering medication?  Yes- leg pains at night    Hypothyroidism Follow-up    Since last visit, patient describes the following symptoms: Weight stable, no hair loss, no skin changes, no constipation, no loose stools        Review of Systems  Constitutional, HEENT, cardiovascular, pulmonary, gi and gu systems are negative, except as otherwise noted.      Objective    /76 (BP Location: Left arm, Patient Position: Sitting, Cuff Size: Adult Regular)   Pulse 85   Temp 97.4  F (36.3  C) (Tympanic)   Resp 16   Ht 1.651 m (5' 5\")   Wt 87.6 kg (193 lb 1.6 oz)   SpO2 99%   BMI 32.13 kg/m    Body mass index is 32.13 kg/m .  Physical Exam   GENERAL: alert and no distress  PSYCH: mentation appears normal, affect normal/bright          Signed Electronically by: Natalia Starr MD    "

## 2024-07-16 NOTE — TELEPHONE ENCOUNTER
Metformin      Last Written Prescription Date:  7/16/24  Last Fill Quantity: 180,   # refills: 1  Last Office Visit: 7/16/24  Future Office visit:    Next 5 appointments (look out 90 days)      Aug 13, 2024 1:30 PM  (Arrive by 1:15 PM)  Return Visit with Ajay Chowdhury MD  [facility] [place] ([facility] [place] ) [address] [place] [address]  [phone]             Routing refill request to provider for review/approval because:  PATIENT REQUESTS A 90 DAY SUPPLY

## 2024-07-17 ENCOUNTER — TELEPHONE (OUTPATIENT)
Dept: MAMMOGRAPHY | Facility: OTHER | Age: 74
End: 2024-07-17

## 2024-07-17 ENCOUNTER — ANCILLARY PROCEDURE (OUTPATIENT)
Dept: MAMMOGRAPHY | Facility: OTHER | Age: 74
End: 2024-07-17
Attending: FAMILY MEDICINE
Payer: COMMERCIAL

## 2024-07-17 DIAGNOSIS — Z12.31 ENCOUNTER FOR SCREENING MAMMOGRAM FOR BREAST CANCER: ICD-10-CM

## 2024-07-17 PROCEDURE — 77063 BREAST TOMOSYNTHESIS BI: CPT | Mod: TC

## 2024-08-10 NOTE — PROGRESS NOTES
[facility]        HPI: Ms. Kayla Lugo is a 73 year old  female with PMH significant for    -Thickening of the anterobasal septum suspicious for HCM  -Recurrent syncope  -Whitecoat hypertension    Patient is being seen today for recurrent syncopal episodes since October 2022.  Patient tells me that she has passed out 3 times so far.  All these 3 events occurred very early in the morning.  All of these syncopal episodes have prodromal symptoms including feeling hot and sweaty.  In October 2022 she used the bathroom, got out and felt hot, flushed, sweaty, loss of consciousness and hit her head.  She did end up hitting her face on the vanity and had some bruises.  The second episode occurred in June 2023.  She woke up in the morning, got up, used the bathroom (micturition) and when she got out she felt sweaty walking to the kitchen.  She passed out in front of the pantry.  She did not injure herself.  Last episode occurred last month in January 2024.  Her boyfriend witnessed the event.  She got up in the morning trying to make coffee in the kitchen.  She felt warm, flushed and sat on the chair.  Her boyfriend tells me that she was very pale, sweaty.  Patient denies feeling palpitations, chest pain or shortness of breath prior to any of these events.  Her boyfriend helped her to go to bed.  She woke up and she was able to continue her daily without any limitations.  Since then she has been doing well.  She has no cardiac symptoms.  She is physically active.  Does not smoke.  No alcohol abuse.  No new medications.  She is on metformin and levothyroxine.    She is currently wearing a heart monitor for a month.  Cardiac MRI October 2023 showed mild thickening of the anterobasal septum otherwise unremarkable and not consistent with HCM.  Recent transthoracic echocardiogram on 1/18 showed normal  biventricular function with no significant valve disease.  Again thickening of the anterobasal septum was noted.    She has high blood pressure in clinic.  She has been monitoring her blood pressure at home and tells me that her blood pressure is usually 127/67 mmHg.  She has tried blood pressure medications in the past but ended up with some side effects.  Diabetes is well-controlled.    Medications, personal, family, and social history reviewed with patient and revised.    Interval history 8/13/2024  Patient is being seen today for follow-up.  She has not had any syncope since I saw her 6 months ago.  She tells me that she eats potato chips to increase her salt intake.  No symptoms.    PAST MEDICAL HISTORY:  Past Medical History:   Diagnosis Date    Diabetes mellitus, type 2 (H) 10/22/2014    Eczema 06/09/2011    Esophageal reflux 06/09/2011    Helicobacter pylori (H. pylori) 06/09/2011    history of     Osteoarthrosis, unspecified whether generalized or localized, lower leg 03/15/2001    Yuval Rapp MD     Personal history of other endocrine, metabolic, and immunity disorders 06/09/2011    Unspecified acquired hypothyroidism 06/09/2011    Natalia Starr MD    Unspecified essential hypertension 03/27/2001    Geremias Rouse MD     Unspecified hearing loss, left 06/09/2011       CURRENT MEDICATIONS:  Current Outpatient Medications   Medication Sig Dispense Refill    atorvastatin (LIPITOR) 10 MG tablet TAKE 1 TABLET(10 MG) BY MOUTH DAILY 90 tablet 1    blood glucose (ACCU-CHEK SMARTVIEW) test strip Use to test blood sugar one time daily. 100 strip 3    blood glucose calibration (ACCU-CHEK SMARTVIEW CONTROL) solution Use to calibrate blood glucose monitor as directed. 1 Bottle 3    blood glucose monitoring (ACCU-CHEK FASTCLIX) lancets USE TO TEST BLOOD SUGARS ONCE DAILY 102 each 3    blood glucose monitoring (ACCU-CHEK DESTINI SMARTVIEW) meter device kit Use to test blood sugar one time daily. 1 kit 0     conjugated estrogens (PREMARIN) 0.625 MG/GM vaginal cream Place 1 g vaginally twice a week 30 g 2    levothyroxine (SYNTHROID/LEVOTHROID) 88 MCG tablet Take 1 tablet (88 mcg) by mouth daily 90 tablet 1    metFORMIN (GLUCOPHAGE XR) 500 MG 24 hr tablet Take 2 tablets (1,000 mg) by mouth 2 times daily (with meals) 180 tablet 1    vitamin D3 (CHOLECALCIFEROL) 1000 units (25 mcg) tablet Take 4 tablets by mouth daily.         PAST SURGICAL HISTORY:  Past Surgical History:   Procedure Laterality Date    ARTHROSCOPY KNEE  2001    RT     COLONOSCOPY  2004    COLONOSCOPY  06/13/2012    Dr. Gorman; 5 year recall given    COLONOSCOPY  07/11/2017    COLONOSCOPY  04/2023    colonoscopy with polypectomy  2007    repeat in five     DILATION AND CURETTAGE  1996    EXCISE LESION LIP Right 09/16/2014    Procedure: EXCISE LESION LIP;  Surgeon: Bri Flores MD;  Location: [facility]    ORTHOPEDIC SURGERY Right 10/05/2015    right shoulder arthoscopy RCR    NADIRA with BSO  1997       ALLERGIES:     Allergies   Allergen Reactions    Hydrochlorothiazide Rash    Atenolol Other (See Comments)     Bradycardia     Lisinopril Cough    Norvasc [Amlodipine] Hallucination       FAMILY HISTORY:  Family History   Problem Relation Age of Onset    Alcohol/Drug Father         alcoholism    C.A.D. Father         (cause of death)     Cancer Mother         basal cell    Ovarian Cancer Mother     Alcohol/Drug Son         alcoholism     C.A.D. Son     Diabetes Brother     Diabetes Maternal Grandmother          SOCIAL HISTORY:  Social History     Tobacco Use    Smoking status: Never     Passive exposure: Never    Smokeless tobacco: Never   Vaping Use    Vaping status: Never Used   Substance Use Topics    Alcohol use: No     Alcohol/week: 0.0 standard drinks of alcohol    Drug use: No       ROS:   Constitutional: No fever, chills, or sweats. Weight stable.   Cardiovascular: As per HPI.       Exam:  BP (!) 144/80   Pulse 86   Temp 96.8  F (36  C)  "(Tympanic)   Resp 16   Ht 1.651 m (5' 5\")   Wt 85.5 kg (188 lb 9.6 oz)   SpO2 96%   BMI 31.38 kg/m    GENERAL APPEARANCE: alert and no distress  HEENT: no icterus, no central cyanosis  LYMPH/NECK: no adenopathy, no asymmetry  CARDIOVASCULAR: regular rhythm, normal S1, S2, no S3 or S4 and no murmur, click or rub, precordium quiet with normal PMI.  EXTREMITIES: no edema  NEURO: alert, normal speech,and affect  SKIN: no ecchymoses, no rashes     I have reviewed the labs and personally reviewed the imaging below and made my comment in the assessment and plan.    Labs:  CBC RESULTS:   Lab Results   Component Value Date    WBC 5.1 01/16/2024    WBC 6.6 09/25/2015    RBC 5.08 01/16/2024    RBC 5.06 09/25/2015    HGB 14.2 01/16/2024    HGB 14.3 09/25/2015    HCT 43.8 01/16/2024    HCT 43.7 09/25/2015    MCV 86 01/16/2024    MCV 86 09/25/2015    MCH 28.0 01/16/2024    MCH 28.3 09/25/2015    MCHC 32.4 01/16/2024    MCHC 32.7 09/25/2015    RDW 13.2 01/16/2024    RDW 13.4 09/25/2015     01/16/2024     09/25/2015       BMP RESULTS:  Lab Results   Component Value Date     07/16/2024     06/29/2021    POTASSIUM 4.0 07/16/2024    POTASSIUM 4.1 07/30/2022    POTASSIUM 4.4 06/29/2021    CHLORIDE 102 07/16/2024    CHLORIDE 106 07/30/2022    CHLORIDE 104 06/29/2021    CO2 24 07/16/2024    CO2 27 07/30/2022    CO2 28 06/29/2021    ANIONGAP 15 07/16/2024    ANIONGAP 4 07/30/2022    ANIONGAP 5 06/29/2021     (H) 07/16/2024     (H) 07/30/2022     (H) 06/29/2021    BUN 13.8 07/16/2024    BUN 20 07/30/2022    BUN 16 06/29/2021    CR 0.90 07/16/2024    CR 0.80 06/29/2021    GFRESTIMATED 67 07/16/2024    GFRESTIMATED 74 06/29/2021    GFRESTBLACK 86 06/29/2021    KIRSTEN 10.3 07/16/2024    KIRSTEN 9.6 06/29/2021   Mobile telemetry January 2024  Cardionet MCOT patch report on Alondra Lugo.  Ordered secondary to hypertrophic cardiomyopathy.Monitor was worn for 27 days, 19 hr's, and 39 min's.  " Prescribed for 30 days.  The patch was placed on 1/18/2024 and was completed on 2/16/2024.The underlying rhythm was sinus.Hrt rate ranged from 50 bpm, maximum heart rate of 120 bpm, averaging 71 bpm.No atrial fibrillation, SVT, pauses, heart block, or VT.  Atrial fibrillation was not present.   x121 symptomatic events and x4 device triggered events.  These corresponded to sinus rhythm, PVC's, sinus bradycardia, and sinus tachycardia.   x0 of PAC's.     x3,474 of PVC's consisting of <1%.      Transthoracic echocardiogram 1/18/2024:  Global and regional left ventricular function is hyperkinetic with an EF of  65-70%.Thickening of the anterobasal septum is present.  The right ventricle is normal size.Global right ventricular function is normal.  IVC diameter <2.1 cm collapsing >50% with sniff suggests a normal RA pressure  of 3 mmHg.  No pericardial effusion is present.  There has been no change  Cardiac MR 10/2023 at [facility]  IMPRESSION:   1. The left ventricle is normal in cavity size. The wall thickness is mildly increased at basal septal and anteroseptal walls with max thickness of 14 mm (sigmoid septum). There is no increased wall thickness in the rest of myocardium. The total LV mass is within normal limits. Global systolic function is normal. There are no regional wall motion abnormalities. The quantitative LV ejection fraction is  67  %.     2. The right ventricle is normal in cavity size, wall thickness, and systolic function. The quantitative RV ejection fraction is  53  %.     3. Delayed enhancement imaging demonstrates no evidence of myocardial infarction, fibrosis or infiltrative disease.     4. Findings are not compatible with hypertrophic obstructive cardiomyopathy in the conventional fashion. There is however sigmoid septum present at maximum thickness of 14 mm, with mildly increased LVOT gradient as per echo report. Clinical correlation is required.         Transthoracic Echocardiogram  7/2023  Interpretation Summary  Global and regional left ventricular function is normal with an EF of 60-65%.  Left ventricular wall thickness is normal. Left ventricular size is normal.  Thickening of the anterobasal septum is present. Left ventricular diastolic  function is indeterminate. No regional wall motion abnormalities are seen.  Right ventricular function, chamber size, wall motion, and thickness are  normal.  The inferior vena cava is normal.  No pericardial effusion is present.     Leadless EKG Monitor 8/2022  Review of zio report no tachybrady arrhythmia. No pauses or heart block.       Echocardiogram 8/2022   Interpretation Summary  Global and regional left ventricular function is hyperkinetic with an EF of  65-70%.  Mild thickening of the basal septum, but no doppler evidence of flow  acceleration in the LVOT and no obvious HOWARD of the mitral valve.  Global right ventricular function is normal.  No hemodynamically significant valve abnormalities.  The inferior vena cava is normal.  There is no prior study for direct comparison.    Assessment :     # Vasovagal syncope  # Hypertension uncontrolled  -No recurrent syncope over the last 6 months. Unfortunately patient raised salt intake.  Blood pressure is high in clinic.    Plan:  -When she has warning symptoms of syncope she is going to lower herself down to avoid injuries  -Continue hydration and avoid hot tubs and hot shower  -Start losartan 25 mg daily  -She is going to update us about her blood pressure readings from home in a month.    Return to clinic as needed.    Total time spent today for this visit is 26 minutes including precharting, face-to-face clinic visit, review of labs/imaging and medical documentation.    Ajay SAN MD  [facility] Division of Cardiology  Pager [phone]

## 2024-08-13 ENCOUNTER — OFFICE VISIT (OUTPATIENT)
Dept: CARDIOLOGY | Facility: OTHER | Age: 74
End: 2024-08-13
Attending: INTERNAL MEDICINE
Payer: COMMERCIAL

## 2024-08-13 VITALS
HEIGHT: 65 IN | SYSTOLIC BLOOD PRESSURE: 144 MMHG | RESPIRATION RATE: 16 BRPM | BODY MASS INDEX: 31.42 KG/M2 | OXYGEN SATURATION: 96 % | WEIGHT: 188.6 LBS | TEMPERATURE: 96.8 F | DIASTOLIC BLOOD PRESSURE: 80 MMHG | HEART RATE: 86 BPM

## 2024-08-13 DIAGNOSIS — I10 PRIMARY HYPERTENSION: Primary | ICD-10-CM

## 2024-08-13 DIAGNOSIS — R55 VASOVAGAL SYNCOPE: ICD-10-CM

## 2024-08-13 DIAGNOSIS — R03.0 ELEVATED BLOOD PRESSURE READING WITHOUT DIAGNOSIS OF HYPERTENSION: ICD-10-CM

## 2024-08-13 PROCEDURE — 99213 OFFICE O/P EST LOW 20 MIN: CPT | Performed by: INTERNAL MEDICINE

## 2024-08-13 PROCEDURE — G0463 HOSPITAL OUTPT CLINIC VISIT: HCPCS

## 2024-08-13 RX ORDER — LOSARTAN POTASSIUM 25 MG/1
25 TABLET ORAL DAILY
Qty: 90 TABLET | Refills: 3 | Status: SHIPPED | OUTPATIENT
Start: 2024-08-13

## 2024-08-13 ASSESSMENT — PAIN SCALES - GENERAL: PAINLEVEL: NO PAIN (0)

## 2024-08-13 NOTE — NURSING NOTE
"Chief Complaint   Patient presents with    Follow Up     6 month follow up       Initial BP (!) 148/96 (BP Location: Right arm, Patient Position: Sitting, Cuff Size: Adult Regular)   Pulse 86   Temp 96.8  F (36  C) (Tympanic)   Resp 16   Ht 1.651 m (5' 5\")   Wt 85.5 kg (188 lb 9.6 oz)   SpO2 96%   BMI 31.38 kg/m   Estimated body mass index is 31.38 kg/m  as calculated from the following:    Height as of this encounter: 1.651 m (5' 5\").    Weight as of this encounter: 85.5 kg (188 lb 9.6 oz).  Medication Reconciliation: complete    Siena Wheeler RN    "

## 2024-08-13 NOTE — PATIENT INSTRUCTIONS
Thank you for allowing Dr. Chowdhury and our  team to participate in your care. Please call our office at [phone] with scheduling questions or if you need to cancel or change your appointment. With any other questions or concerns you may call cardiology nurse at [phone].       If you experience chest pain, chest pressure, chest tightness, shortness of breath, fainting, lightheadedness, nausea, vomiting, or other concerning symptoms, please report to the Emergency Department or call 911. These symptoms may be emergent, and best treated in the Emergency Department.

## 2024-08-28 ENCOUNTER — MEDICAL CORRESPONDENCE (OUTPATIENT)
Dept: HEALTH INFORMATION MANAGEMENT | Facility: HOSPITAL | Age: 74
End: 2024-08-28

## 2024-09-26 ENCOUNTER — TRANSFERRED RECORDS (OUTPATIENT)
Dept: HEALTH INFORMATION MANAGEMENT | Facility: CLINIC | Age: 74
End: 2024-09-26

## 2024-10-21 ENCOUNTER — OFFICE VISIT (OUTPATIENT)
Dept: FAMILY MEDICINE | Facility: OTHER | Age: 74
End: 2024-10-21
Attending: FAMILY MEDICINE
Payer: COMMERCIAL

## 2024-10-21 VITALS
HEART RATE: 72 BPM | TEMPERATURE: 97.7 F | SYSTOLIC BLOOD PRESSURE: 152 MMHG | HEIGHT: 65 IN | OXYGEN SATURATION: 98 % | WEIGHT: 189 LBS | DIASTOLIC BLOOD PRESSURE: 86 MMHG | RESPIRATION RATE: 16 BRPM | BODY MASS INDEX: 31.49 KG/M2

## 2024-10-21 DIAGNOSIS — E78.2 MIXED HYPERLIPIDEMIA: ICD-10-CM

## 2024-10-21 DIAGNOSIS — I10 ESSENTIAL HYPERTENSION: ICD-10-CM

## 2024-10-21 DIAGNOSIS — E11.9 TYPE 2 DIABETES MELLITUS WITHOUT COMPLICATION, WITHOUT LONG-TERM CURRENT USE OF INSULIN (H): Primary | ICD-10-CM

## 2024-10-21 LAB
ALT SERPL W P-5'-P-CCNC: 16 U/L (ref 0–50)
ANION GAP SERPL CALCULATED.3IONS-SCNC: 13 MMOL/L (ref 7–15)
BUN SERPL-MCNC: 12.6 MG/DL (ref 8–23)
CALCIUM SERPL-MCNC: 9.9 MG/DL (ref 8.8–10.4)
CHLORIDE SERPL-SCNC: 102 MMOL/L (ref 98–107)
CHOLEST SERPL-MCNC: 178 MG/DL
CREAT SERPL-MCNC: 0.9 MG/DL (ref 0.51–0.95)
EGFRCR SERPLBLD CKD-EPI 2021: 67 ML/MIN/1.73M2
EST. AVERAGE GLUCOSE BLD GHB EST-MCNC: 123 MG/DL
FASTING STATUS PATIENT QL REPORTED: YES
FASTING STATUS PATIENT QL REPORTED: YES
GLUCOSE SERPL-MCNC: 114 MG/DL (ref 70–99)
HBA1C MFR BLD: 5.9 %
HCO3 SERPL-SCNC: 24 MMOL/L (ref 22–29)
HDLC SERPL-MCNC: 38 MG/DL
LDLC SERPL CALC-MCNC: 116 MG/DL
NONHDLC SERPL-MCNC: 140 MG/DL
POTASSIUM SERPL-SCNC: 4.6 MMOL/L (ref 3.4–5.3)
SODIUM SERPL-SCNC: 139 MMOL/L (ref 135–145)
TRIGL SERPL-MCNC: 119 MG/DL

## 2024-10-21 PROCEDURE — G2211 COMPLEX E/M VISIT ADD ON: HCPCS | Performed by: FAMILY MEDICINE

## 2024-10-21 PROCEDURE — 84460 ALANINE AMINO (ALT) (SGPT): CPT | Mod: ZL | Performed by: FAMILY MEDICINE

## 2024-10-21 PROCEDURE — 80048 BASIC METABOLIC PNL TOTAL CA: CPT | Mod: ZL | Performed by: FAMILY MEDICINE

## 2024-10-21 PROCEDURE — G0463 HOSPITAL OUTPT CLINIC VISIT: HCPCS

## 2024-10-21 PROCEDURE — 80061 LIPID PANEL: CPT | Mod: ZL | Performed by: FAMILY MEDICINE

## 2024-10-21 PROCEDURE — 82947 ASSAY GLUCOSE BLOOD QUANT: CPT | Mod: ZL | Performed by: FAMILY MEDICINE

## 2024-10-21 PROCEDURE — 83036 HEMOGLOBIN GLYCOSYLATED A1C: CPT | Mod: ZL | Performed by: FAMILY MEDICINE

## 2024-10-21 PROCEDURE — 36415 COLL VENOUS BLD VENIPUNCTURE: CPT | Mod: ZL | Performed by: FAMILY MEDICINE

## 2024-10-21 PROCEDURE — 99214 OFFICE O/P EST MOD 30 MIN: CPT | Performed by: FAMILY MEDICINE

## 2024-10-21 RX ORDER — ROSUVASTATIN CALCIUM 5 MG/1
5 TABLET, COATED ORAL DAILY
Qty: 90 TABLET | OUTPATIENT
Start: 2024-10-21

## 2024-10-21 RX ORDER — ROSUVASTATIN CALCIUM 5 MG/1
5 TABLET, COATED ORAL DAILY
Qty: 30 TABLET | Refills: 2 | Status: SHIPPED | OUTPATIENT
Start: 2024-10-21

## 2024-10-21 ASSESSMENT — PAIN SCALES - GENERAL: PAINLEVEL: NO PAIN (0)

## 2024-10-21 NOTE — PROGRESS NOTES
Assessment & Plan     1. Type 2 diabetes mellitus without complication, without long-term current use of insulin (H)  Labs updated, will refill medication when due.  - Hemoglobin A1c; Future  - Hemoglobin A1c    2. Mixed hyperlipidemia  Labs updated, will change statin to Crestor as she did have myalgias with Lipitor.  Follow-up in three months for lab recheck, sooner as needed.  - ALT; Future  - Lipid Profile; Future  - rosuvastatin (CRESTOR) 5 MG tablet; Take 1 tablet (5 mg) by mouth daily.  Dispense: 30 tablet; Refill: 2  - ALT  - Lipid Profile    3. Essential hypertension  As above.  Home readings have been normal with verified cuff, so I'm not worried about slightly elevated reading here today.  - Basic metabolic panel; Future  - Basic metabolic panel       The longitudinal plan of care for the diagnosis(es)/condition(s) as documented were addressed during this visit. Due to the added complexity in care, I will continue to support Kayla in the subsequent management and with ongoing continuity of care.     Return in about 3 months (around 1/21/2025) for Diabetic Follow-up, Chronic Disease Management, Medication review.      Irina Garza is a 74 year old, presenting for the following health issues:  Diabetes, Thyroid Problem, and Hypertension        10/21/2024    10:00 AM   Additional Questions   Roomed by tabatha   Accompanied by none     HPI     Diabetes Follow-up    How often are you checking your blood sugar? A few times a month  What time of day are you checking your blood sugars (select all that apply)?  Before meals  Have you had any blood sugars above 200?  No  Have you had any blood sugars below 70?  No  What symptoms do you notice when your blood sugar is low?  Shaky  What concerns do you have today about your diabetes? None   Do you have any of these symptoms? (Select all that apply)  No numbness or tingling in feet.  No redness, sores or blisters on feet.  No complaints of excessive thirst.  No  "reports of blurry vision.  No significant changes to weight.          Hyperlipidemia Follow-Up    Are you regularly taking any medication or supplement to lower your cholesterol?   No  Are you having muscle aches or other side effects that you think could be caused by your cholesterol lowering medication?  Yes- was on hold for 3 months and cramping went away     Hypertension Follow-up    Do you check your blood pressure regularly outside of the clinic? Yes   Are you following a low salt diet? No  Are your blood pressures ever more than 140 on the top number (systolic) OR more   than 90 on the bottom number (diastolic), for example 140/90? Yes  Patient does bring in record of home readings, including normal reading this morning.  She has developed a component of white coat syndrome.      BP Readings from Last 2 Encounters:   10/21/24 (!) 152/86   09/16/24 124/80     Hemoglobin A1C (%)   Date Value   10/21/2024 5.9 (H)   07/16/2024 6.4 (H)   06/29/2021 5.8 (H)   12/01/2020 5.8 (H)     LDL Cholesterol Calculated (mg/dL)   Date Value   10/21/2024 116 (H)   07/16/2024 46   06/29/2021 71   12/01/2020 71         Hypothyroidism Follow-up    Since last visit, patient describes the following symptoms: Weight stable, no hair loss, no skin changes, no constipation, no loose stools          Review of Systems  Constitutional, HEENT, cardiovascular, pulmonary, gi and gu systems are negative, except as otherwise noted.      Objective    BP (!) 152/86 (BP Location: Right arm, Patient Position: Chair, Cuff Size: Adult Regular)   Pulse 72   Temp 97.7  F (36.5  C) (Tympanic)   Resp 16   Ht 1.651 m (5' 5\")   Wt 85.7 kg (189 lb)   SpO2 98%   BMI 31.45 kg/m    Body mass index is 31.45 kg/m .  Physical Exam   GENERAL: alert and no distress  PSYCH: mentation appears normal, affect normal/bright          Signed Electronically by: Natalia Starr MD    "

## 2024-11-08 DIAGNOSIS — E11.9 TYPE 2 DIABETES MELLITUS WITHOUT COMPLICATION, WITHOUT LONG-TERM CURRENT USE OF INSULIN (H): ICD-10-CM

## 2024-11-08 RX ORDER — METFORMIN HYDROCHLORIDE 500 MG/1
1000 TABLET, EXTENDED RELEASE ORAL 2 TIMES DAILY WITH MEALS
Qty: 360 TABLET | Refills: 1 | Status: SHIPPED | OUTPATIENT
Start: 2024-11-08

## 2024-11-18 DIAGNOSIS — E11.9 TYPE 2 DIABETES MELLITUS WITHOUT COMPLICATION, WITHOUT LONG-TERM CURRENT USE OF INSULIN (H): ICD-10-CM

## 2024-11-18 RX ORDER — METFORMIN HYDROCHLORIDE 500 MG/1
1000 TABLET, EXTENDED RELEASE ORAL 2 TIMES DAILY WITH MEALS
Qty: 360 TABLET | Refills: 1 | OUTPATIENT
Start: 2024-11-18

## 2024-11-19 ENCOUNTER — TELEPHONE (OUTPATIENT)
Dept: INTERNAL MEDICINE | Facility: OTHER | Age: 74
End: 2024-11-19

## 2024-11-19 NOTE — TELEPHONE ENCOUNTER
Patient presents to [facility] to discuss denial of metformin.     Upon review, a refill for metformin was sent to the pharmacy on 11/8/24.  A second refill request was received on 11/18/24 which was denied due to the medication refill being sent on 11/8/24. RN spoke with pharmacist, updates provided as per order below.      Disp Refills Start End MALINI   metFORMIN (GLUCOPHAGE XR) 500 MG 24 hr tablet 360 tablet 1 11/8/2024 -- No   Sig - Route: TAKE 2 TABLETS(1000 MG) BY MOUTH TWICE DAILY WITH MEALS - Oral   Sent to pharmacy as: metFORMIN HCl  MG Oral Tablet Extended Release 24 Hour (GLUCOPHAGE XR)   Class: E-Prescribe   Notes to Pharmacy: **Patient requests 90 days supply**   Order: [id number]   E-Prescribing Status: Receipt confirmed by pharmacy (11/8/2024 10:12 AM CST)

## 2025-01-09 DIAGNOSIS — E03.9 HYPOTHYROIDISM, UNSPECIFIED TYPE: ICD-10-CM

## 2025-01-09 RX ORDER — LEVOTHYROXINE SODIUM 88 UG/1
88 TABLET ORAL DAILY
Qty: 90 TABLET | Refills: 2 | Status: SHIPPED | OUTPATIENT
Start: 2025-01-09

## 2025-01-09 NOTE — TELEPHONE ENCOUNTER
Synthroid      Last Written Prescription Date:  07/09/24  Last Fill Quantity: 90,   # refills: 1  Last Office Visit: 10/21/24  Future Office visit:    Next 5 appointments (look out 90 days)      Jan 20, 2025 9:00 AM  (Arrive by 8:45 AM)  Adult Preventative Visit with Natalia Starr MD  [facility] ([facility] [address]  [facility] [address]  [phone]

## 2025-01-20 ENCOUNTER — OFFICE VISIT (OUTPATIENT)
Dept: FAMILY MEDICINE | Facility: OTHER | Age: 75
End: 2025-01-20
Attending: FAMILY MEDICINE
Payer: COMMERCIAL

## 2025-01-20 VITALS
DIASTOLIC BLOOD PRESSURE: 84 MMHG | RESPIRATION RATE: 16 BRPM | WEIGHT: 194.5 LBS | HEIGHT: 65 IN | SYSTOLIC BLOOD PRESSURE: 144 MMHG | OXYGEN SATURATION: 98 % | HEART RATE: 76 BPM | BODY MASS INDEX: 32.4 KG/M2 | TEMPERATURE: 98.4 F

## 2025-01-20 DIAGNOSIS — I10 WHITE COAT SYNDROME WITH DIAGNOSIS OF HYPERTENSION: ICD-10-CM

## 2025-01-20 DIAGNOSIS — E78.2 MIXED HYPERLIPIDEMIA: ICD-10-CM

## 2025-01-20 DIAGNOSIS — E03.9 HYPOTHYROIDISM, UNSPECIFIED TYPE: ICD-10-CM

## 2025-01-20 DIAGNOSIS — I10 ESSENTIAL HYPERTENSION: ICD-10-CM

## 2025-01-20 DIAGNOSIS — E11.9 TYPE 2 DIABETES MELLITUS WITHOUT COMPLICATION, WITHOUT LONG-TERM CURRENT USE OF INSULIN (H): ICD-10-CM

## 2025-01-20 DIAGNOSIS — Z00.00 ENCOUNTER FOR MEDICARE ANNUAL WELLNESS EXAM: Primary | ICD-10-CM

## 2025-01-20 LAB
ALT SERPL W P-5'-P-CCNC: 19 U/L (ref 0–50)
ANION GAP SERPL CALCULATED.3IONS-SCNC: 10 MMOL/L (ref 7–15)
BUN SERPL-MCNC: 13 MG/DL (ref 8–23)
CALCIUM SERPL-MCNC: 10.3 MG/DL (ref 8.8–10.4)
CHLORIDE SERPL-SCNC: 101 MMOL/L (ref 98–107)
CHOLEST SERPL-MCNC: 121 MG/DL
CREAT SERPL-MCNC: 0.92 MG/DL (ref 0.51–0.95)
EGFRCR SERPLBLD CKD-EPI 2021: 65 ML/MIN/1.73M2
EST. AVERAGE GLUCOSE BLD GHB EST-MCNC: 123 MG/DL
FASTING STATUS PATIENT QL REPORTED: YES
FASTING STATUS PATIENT QL REPORTED: YES
GLUCOSE SERPL-MCNC: 104 MG/DL (ref 70–99)
HBA1C MFR BLD: 5.9 %
HCO3 SERPL-SCNC: 28 MMOL/L (ref 22–29)
HDLC SERPL-MCNC: 38 MG/DL
LDLC SERPL CALC-MCNC: 58 MG/DL
NONHDLC SERPL-MCNC: 83 MG/DL
POTASSIUM SERPL-SCNC: 4.4 MMOL/L (ref 3.4–5.3)
SODIUM SERPL-SCNC: 139 MMOL/L (ref 135–145)
TRIGL SERPL-MCNC: 126 MG/DL

## 2025-01-20 PROCEDURE — 80061 LIPID PANEL: CPT | Mod: ZL | Performed by: FAMILY MEDICINE

## 2025-01-20 PROCEDURE — 99207 PR FOOT EXAM NO CHARGE: CPT | Performed by: FAMILY MEDICINE

## 2025-01-20 PROCEDURE — 99214 OFFICE O/P EST MOD 30 MIN: CPT | Mod: 25 | Performed by: FAMILY MEDICINE

## 2025-01-20 PROCEDURE — 83036 HEMOGLOBIN GLYCOSYLATED A1C: CPT | Mod: ZL | Performed by: FAMILY MEDICINE

## 2025-01-20 PROCEDURE — 80048 BASIC METABOLIC PNL TOTAL CA: CPT | Mod: ZL | Performed by: FAMILY MEDICINE

## 2025-01-20 PROCEDURE — 84460 ALANINE AMINO (ALT) (SGPT): CPT | Mod: ZL | Performed by: FAMILY MEDICINE

## 2025-01-20 PROCEDURE — G0439 PPPS, SUBSEQ VISIT: HCPCS | Performed by: FAMILY MEDICINE

## 2025-01-20 PROCEDURE — G0463 HOSPITAL OUTPT CLINIC VISIT: HCPCS

## 2025-01-20 PROCEDURE — G2211 COMPLEX E/M VISIT ADD ON: HCPCS | Performed by: FAMILY MEDICINE

## 2025-01-20 PROCEDURE — 36415 COLL VENOUS BLD VENIPUNCTURE: CPT | Mod: ZL | Performed by: FAMILY MEDICINE

## 2025-01-20 PROCEDURE — 82374 ASSAY BLOOD CARBON DIOXIDE: CPT | Mod: ZL | Performed by: FAMILY MEDICINE

## 2025-01-20 RX ORDER — ROSUVASTATIN CALCIUM 5 MG/1
5 TABLET, COATED ORAL DAILY
Qty: 90 TABLET | Refills: 1 | Status: SHIPPED | OUTPATIENT
Start: 2025-01-20

## 2025-01-20 SDOH — HEALTH STABILITY: PHYSICAL HEALTH: ON AVERAGE, HOW MANY DAYS PER WEEK DO YOU ENGAGE IN MODERATE TO STRENUOUS EXERCISE (LIKE A BRISK WALK)?: 0 DAYS

## 2025-01-20 ASSESSMENT — PAIN SCALES - GENERAL: PAINLEVEL_OUTOF10: MILD PAIN (1)

## 2025-01-20 ASSESSMENT — SOCIAL DETERMINANTS OF HEALTH (SDOH): HOW OFTEN DO YOU GET TOGETHER WITH FRIENDS OR RELATIVES?: MORE THAN THREE TIMES A WEEK

## 2025-01-20 NOTE — PROGRESS NOTES
"Preventive Care Visit  [facility]  Natalia Starr MD, Family Medicine  Jan 20, 2025      Assessment & Plan       ICD-10-CM    1. Encounter for Medicare annual wellness exam  Z00.00       2. Type 2 diabetes mellitus without complication, without long-term current use of insulin (H)  E11.9 Hemoglobin A1c     MO FOOT EXAM NO CHARGE     Hemoglobin A1c      3. Mixed hyperlipidemia  E78.2 rosuvastatin (CRESTOR) 5 MG tablet     ALT     Lipid Profile     ALT     Lipid Profile      4. Essential hypertension  I10 Basic metabolic panel     Basic metabolic panel      5. White coat syndrome with diagnosis of hypertension  I10       6. Hypothyroidism, unspecified type  E03.9         Chronic conditions - DM, HTN, hyperlipidemia, and hypothyroidism - are stable.  Labs are updated, medications refilled as needed.  Follow-up in six months for CDM, sooner as needed.  Patient has normal BP at home, this mornings readings added below  Other screening measures are UTD    Patient has been advised of split billing requirements and indicates understanding: Yes        BMI  Estimated body mass index is 32.37 kg/m  as calculated from the following:    Height as of this encounter: 1.651 m (5' 5\").    Weight as of this encounter: 88.2 kg (194 lb 8 oz).   Weight management plan: Discussed healthy diet and exercise guidelines    Counseling  Appropriate preventive services were addressed with this patient via screening, questionnaire, or discussion as appropriate for fall prevention, nutrition, physical activity, Tobacco-use cessation, social engagement, weight loss and cognition.  Checklist reviewing preventive services available has been given to the patient.  Reviewed patient's diet, addressing concerns and/or questions.   The patient was instructed to see the dentist every 6 months.       Return in about 6 months (around 7/20/2025) for Diabetic Follow-up, Chronic Disease Management, Medication review.      Irina Garza is a 74 year " old, presenting for the following:  Physical            HPI    Diabetes Follow-up    How often are you checking your blood sugar? One time daily  What time of day are you checking your blood sugars (select all that apply)?  Before meals  Have you had any blood sugars above 200?  No  Have you had any blood sugars below 70?  No  What symptoms do you notice when your blood sugar is low?  Not applicable  What concerns do you have today about your diabetes? None   Do you have any of these symptoms? (Select all that apply)  No numbness or tingling in feet.  No redness, sores or blisters on feet.  No complaints of excessive thirst.  No reports of blurry vision.  No significant changes to weight.      BP Readings from Last 2 Encounters:   01/20/25 (!) 144/84   10/21/24 (!) 152/86     Hemoglobin A1C (%)   Date Value   10/21/2024 5.9 (H)   07/16/2024 6.4 (H)   06/29/2021 5.8 (H)   12/01/2020 5.8 (H)     LDL Cholesterol Calculated (mg/dL)   Date Value   10/21/2024 116 (H)   07/16/2024 46   06/29/2021 71   12/01/2020 71             Hyperlipidemia Follow-Up    Are you regularly taking any medication or supplement to lower your cholesterol?   Yes- Crestor  Are you having muscle aches or other side effects that you think could be caused by your cholesterol lowering medication?  No    Hypertension Follow-up    Do you check your blood pressure regularly outside of the clinic? Yes   Are you following a low salt diet? Yes  Are your blood pressures ever more than 140 on the top number (systolic) OR more   than 90 on the bottom number (diastolic), for example 140/90? No  BP was 122/75 this morning at home    Hypothyroidism Follow-up    Since last visit, patient describes the following symptoms: Weight stable, no hair loss, no skin changes, no constipation, no loose stools    Health Care Directive  Patient does not have a Health Care Directive: Discussed advance care planning with patient; however, patient declined at this time.       1/20/2025   General Health   How would you rate your overall physical health? Good   Feel stress (tense, anxious, or unable to sleep) Only a little   (!) STRESS CONCERN      1/20/2025   Nutrition   Diet: Low salt    I don't know       Multiple values from one day are sorted in reverse-chronological order         1/20/2025   Exercise   Days per week of moderate/strenous exercise 0 days   (!) EXERCISE CONCERN      1/20/2025   Social Factors   Frequency of gathering with friends or relatives More than three times a week   Worry food won't last until get money to buy more No   Food not last or not have enough money for food? No   Do you have housing? (Housing is defined as stable permanent housing and does not include staying ouside in a car, in a tent, in an abandoned building, in an overnight shelter, or couch-surfing.) Yes   Are you worried about losing your housing? No   Lack of transportation? No   Unable to get utilities (heat,electricity)? No         1/20/2025   Fall Risk   Fallen 2 or more times in the past year? No    No   Trouble with walking or balance? No    No       Multiple values from one day are sorted in reverse-chronological order          1/20/2025   Activities of Daily Living- Home Safety   Needs help with the following daily activites None of the above   Safety concerns in the home None of the above         1/20/2025   Dental   Dentist two times every year? (!) NO         1/20/2025   Hearing Screening   Hearing concerns? None of the above         1/20/2025   Driving Risk Screening   Patient/family members have concerns about driving No         1/20/2025   General Alertness/Fatigue Screening   Have you been more tired than usual lately? No         1/20/2025   Urinary Incontinence Screening   Bothered by leaking urine in past 6 months No         1/20/2025   TB Screening   Were you born outside of the [place]? No         Today's PHQ-2 Score:       1/20/2025     8:35 AM   PHQ-2 ( 1999 [employer])   Q1: Little  interest or pleasure in doing things 0   Q2: Feeling down, depressed or hopeless 0   PHQ-2 Score 0    Q1: Little interest or pleasure in doing things Not at all   Q2: Feeling down, depressed or hopeless Not at all   PHQ-2 Score 0       Patient-reported           2025   Substance Use   Alcohol more than 3/day or more than 7/wk Not Applicable   Do you have a current opioid prescription? No   How severe/bad is pain from 1 to 10? 1/10   Do you use any other substances recreationally? No     Social History     Tobacco Use    Smoking status: Never     Passive exposure: Never    Smokeless tobacco: Never   Vaping Use    Vaping status: Never Used   Substance Use Topics    Alcohol use: No     Alcohol/week: 0.0 standard drinks of alcohol    Drug use: No           2024   LAST FHS-7 RESULTS   1st degree relative breast or ovarian cancer No   Any relative bilateral breast cancer No   Any male have breast cancer No   Any ONE woman have BOTH breast AND ovarian cancer No   Any woman with breast cancer before 50yrs No   2 or more relatives with breast AND/OR ovarian cancer No   2 or more relatives with breast AND/OR bowel cancer No        Mammogram Screening - Mammogram every 1-2 years updated in Health Maintenance based on mutual decision making    ASCVD Risk   The 10-year ASCVD risk score (Slime CARLSON, et al., 2019) is: 37.2%    Values used to calculate the score:      Age: 74 years      Sex: Female      Is Non- : No      Diabetic: Yes      Tobacco smoker: No      Systolic Blood Pressure: 144 mmHg      Is BP treated: Yes      HDL Cholesterol: 38 mg/dL      Total Cholesterol: 178 mg/dL    Fracture Risk Assessment Tool  Link to Frax Calculator  Use the information below to complete the Frax calculator  : 1950  Sex: female  Weight (kg): 88.2 kg (actual weight)  Height (cm): 165.1 cm  Previous Fragility Fracture:  No  History of parent with fractured hip:  No  Current Smoking:   No  Patient has been on glucocorticoids for more than 3 months (5mg/day or more): No  Rheumatoid Arthritis on Problem List:  No  Secondary Osteoporosis on Problem List:  No  Consumes 3 or more units of alcohol per day: No  Femoral Neck BMD (g/cm2)            Reviewed and updated as needed this visit by Provider   Tobacco  Allergies  Meds  Problems  Med Hx  Surg Hx  Fam Hx            Patient Active Problem List   Diagnosis    Essential hypertension    Hypothyroidism    Esophageal reflux    Osteoarthrosis, unspecified whether generalized or localized, involving lower leg    Eczema    Hearing loss    Advanced care planning/counseling discussion    Skin lesion of face    Basal cell carcinoma of upper lip    S/P excision of skin lesion, follow-up exam    Type 2 diabetes mellitus without complication, without long-term current use of insulin (H)    History of labyrinthitis    Mixed hyperlipidemia    Adjustment disorder with depressed mood    Dysmetabolic syndrome X    Family history of ischemic heart disease    Obesity    Personal history of colon polyps, unspecified    Personal history of other malignant neoplasm of skin    Photokeratitis    Personal history of other disorders of nervous system and sense organs    SCC (squamous cell carcinoma), arm, right     Past Surgical History:   Procedure Laterality Date    ARTHROSCOPY KNEE  2001    RT     COLONOSCOPY  2004    COLONOSCOPY  06/13/2012    Dr. Gorman; 5 year recall given    COLONOSCOPY  07/11/2017    COLONOSCOPY  04/2023    colonoscopy with polypectomy  2007    repeat in five     DILATION AND CURETTAGE  1996    EXCISE LESION LIP Right 09/16/2014    Procedure: EXCISE LESION LIP;  Surgeon: Bri Flores MD;  Location: [facility]    ORTHOPEDIC SURGERY Right 10/05/2015    right shoulder arthoscopy RCR    NADIRA with BSO  1997       Social History     Tobacco Use    Smoking status: Never     Passive exposure: Never    Smokeless tobacco: Never   Substance Use  Topics    Alcohol use: No     Alcohol/week: 0.0 standard drinks of alcohol     Family History   Problem Relation Age of Onset    Cancer Mother         basal cell    Ovarian Cancer Mother     Alcohol/Drug Father         alcoholism    C.A.D. Father         (cause of death)     Diabetes Brother     Bladder Cancer Brother     Diabetes Maternal Grandmother     Alcohol/Drug Son         alcoholism     C.A.D. Son          Current Outpatient Medications   Medication Sig Dispense Refill    blood glucose (ACCU-CHEK SMARTVIEW) test strip Use to test blood sugar one time daily. 100 strip 3    blood glucose calibration (ACCU-CHEK SMARTVIEW CONTROL) solution Use to calibrate blood glucose monitor as directed. 1 Bottle 3    blood glucose monitoring (ACCU-CHEK FASTCLIX) lancets USE TO TEST BLOOD SUGARS ONCE DAILY 102 each 3    blood glucose monitoring (ACCU-CHEK DESTINI SMARTVIEW) meter device kit Use to test blood sugar one time daily. 1 kit 0    levothyroxine (SYNTHROID/LEVOTHROID) 88 MCG tablet Take 1 tablet (88 mcg) by mouth daily. 90 tablet 2    losartan (COZAAR) 25 MG tablet Take 1 tablet (25 mg) by mouth daily 90 tablet 3    metFORMIN (GLUCOPHAGE XR) 500 MG 24 hr tablet TAKE 2 TABLETS(1000 MG) BY MOUTH TWICE DAILY WITH MEALS 360 tablet 1    rosuvastatin (CRESTOR) 5 MG tablet Take 1 tablet (5 mg) by mouth daily. 90 tablet 1    vitamin D3 (CHOLECALCIFEROL) 1000 units (25 mcg) tablet Take 4 tablets by mouth daily.      conjugated estrogens (PREMARIN) 0.625 MG/GM vaginal cream Place 1 g vaginally twice a week (Patient not taking: Reported on 9/16/2024) 30 g 2     Allergies   Allergen Reactions    Hydrochlorothiazide Rash    Atenolol Other (See Comments)     Bradycardia     Lisinopril Cough    Norvasc [Amlodipine] Hallucination     Current providers sharing in care for this patient include:  Patient Care Team:  Natalia Starr MD as PCP - General  Natalia Starr MD as Assigned PCP  JELANI Amezquita MD as Assigned  "Surgical Provider  Ajay Chowdhury MD as Assigned Heart and Vascular Provider    The following health maintenance items are reviewed in Epic and correct as of today:  Health Maintenance   Topic Date Due    MEDICARE ANNUAL WELLNESS VISIT  07/28/2016    ADVANCE CARE PLANNING  11/20/2022    A1C  04/21/2025    MICROALBUMIN  07/16/2025    TSH W/FREE T4 REFLEX  07/16/2025    BMP  10/21/2025    LIPID  10/21/2025    EYE EXAM  12/30/2025    DIABETIC FOOT EXAM  01/20/2026    FALL RISK ASSESSMENT  01/20/2026    MAMMO SCREENING  07/17/2026    COLORECTAL CANCER SCREENING  04/06/2028    DEXA  08/10/2032    DTAP/TDAP/TD IMMUNIZATION (5 - Td or Tdap) 02/17/2033    HEPATITIS C SCREENING  Completed    PHQ-2 (once per calendar year)  Completed    INFLUENZA VACCINE  Completed    Pneumococcal Vaccine: 50+ Years  Completed    ZOSTER IMMUNIZATION  Completed    RSV VACCINE  Completed    COVID-19 Vaccine  Completed    HPV IMMUNIZATION  Aged Out    MENINGITIS IMMUNIZATION  Aged Out    RSV MONOCLONAL ANTIBODY  Aged Out         Review of Systems  Constitutional, HEENT, cardiovascular, pulmonary, gi and gu systems are negative, except as otherwise noted.     Objective    Exam  BP (!) 144/84 (BP Location: Left arm, Patient Position: Sitting, Cuff Size: Adult Large)   Pulse 76   Temp 98.4  F (36.9  C) (Tympanic)   Resp 16   Ht 1.651 m (5' 5\")   Wt 88.2 kg (194 lb 8 oz)   SpO2 98%   BMI 32.37 kg/m     Estimated body mass index is 32.37 kg/m  as calculated from the following:    Height as of this encounter: 1.651 m (5' 5\").    Weight as of this encounter: 88.2 kg (194 lb 8 oz).    Physical Exam  GENERAL: alert and no distress  EYES: Eyes grossly normal to inspection, PERRL and conjunctivae and sclerae normal  HENT: ear canals and TM's normal, nose and mouth without ulcers or lesions  NECK: no adenopathy and no carotid bruits  RESP: lungs clear to auscultation - no rales, rhonchi or wheezes  CV: regular rates and rhythm, normal S1 S2, no S3 or " S4, and no murmur, click or rub  ABDOMEN: soft, nontender, no hepatosplenomegaly, no masses and bowel sounds normal  MS: no gross musculoskeletal defects noted, no edema  SKIN: no suspicious lesions or rashes  NEURO: Normal strength and tone, mentation intact and speech normal  PSYCH: mentation appears normal, affect normal/bright         1/20/2025   Mini Cog   Clock Draw Score 2 Normal   3 Item Recall 3 objects recalled   Mini Cog Total Score 5              Signed Electronically by: Natalia Starr MD

## 2025-03-04 ENCOUNTER — OFFICE VISIT (OUTPATIENT)
Dept: DERMATOLOGY | Facility: OTHER | Age: 75
End: 2025-03-04
Attending: DERMATOLOGY
Payer: COMMERCIAL

## 2025-03-04 VITALS
DIASTOLIC BLOOD PRESSURE: 89 MMHG | OXYGEN SATURATION: 97 % | HEART RATE: 76 BPM | SYSTOLIC BLOOD PRESSURE: 199 MMHG | RESPIRATION RATE: 18 BRPM

## 2025-03-04 DIAGNOSIS — Z12.83 SCREENING FOR SKIN CANCER: ICD-10-CM

## 2025-03-04 DIAGNOSIS — Z85.828 HISTORY OF NONMELANOMA SKIN CANCER: Primary | ICD-10-CM

## 2025-03-04 PROCEDURE — G0463 HOSPITAL OUTPT CLINIC VISIT: HCPCS

## 2025-03-04 ASSESSMENT — PAIN SCALES - GENERAL: PAINLEVEL_OUTOF10: NO PAIN (0)

## 2025-03-04 NOTE — LETTER
3/4/2025       RE: Kayla Lugo  [address] [place] [address] [address]  [facility] [address]     Dear Colleague,    Thank you for referring your patient, Kayla Lugo, to the [facility]. Please see a copy of my visit note below.    S: Kayla  returns for a checkup.  She was last seen in November 2024 she has some concerning lesions in her scalp and a history of squamous cell carcinoma.    O: Exam of the scalp shows only a few scaly papules none suggesting anything concerning.  Examination of her face or upper chest or back or abdomen showed no lesions other than a few nevi and seborrheic keratoses.  The squamous cell carcinoma had been removed from her right arm and that showed only a healthy scar.  We did not examine her legs.    A: No worrisome lesions no significant scalp lesions of concern    P reassured advised that we found nothing serious today and that nothing needed to be done.  Advised that would be happy to see her again as needed or any year.  Meds and allergies reviewed.  20 minutes spent in chart review examination discussion conversation and counseling      Again, thank you for allowing me to participate in the care of your patient.      Sincerely,    JELANI Amezquita MD

## 2025-03-07 NOTE — PROGRESS NOTES
S: Kayla  returns for a checkup.  She was last seen in November 2024 she has some concerning lesions in her scalp and a history of squamous cell carcinoma.    O: Exam of the scalp shows only a few scaly papules none suggesting anything concerning.  Examination of her face or upper chest or back or abdomen showed no lesions other than a few nevi and seborrheic keratoses.  The squamous cell carcinoma had been removed from her right arm and that showed only a healthy scar.  We did not examine her legs.    A: No worrisome lesions no significant scalp lesions of concern    P reassured advised that we found nothing serious today and that nothing needed to be done.  Advised that would be happy to see her again as needed or any year.  Meds and allergies reviewed.  20 minutes spent in chart review examination discussion conversation and counseling

## 2025-03-09 ENCOUNTER — HEALTH MAINTENANCE LETTER (OUTPATIENT)
Age: 75
End: 2025-03-09

## 2025-03-13 ENCOUNTER — TRANSFERRED RECORDS (OUTPATIENT)
Dept: HEALTH INFORMATION MANAGEMENT | Facility: CLINIC | Age: 75
End: 2025-03-13

## 2025-05-08 DIAGNOSIS — E11.9 TYPE 2 DIABETES MELLITUS WITHOUT COMPLICATION, WITHOUT LONG-TERM CURRENT USE OF INSULIN (H): ICD-10-CM

## 2025-05-08 RX ORDER — METFORMIN HYDROCHLORIDE 500 MG/1
1000 TABLET, EXTENDED RELEASE ORAL 2 TIMES DAILY WITH MEALS
Qty: 360 TABLET | Refills: 0 | Status: SHIPPED | OUTPATIENT
Start: 2025-05-08

## 2025-05-08 NOTE — TELEPHONE ENCOUNTER
Metformin ER      Last Written Prescription Date:  11/08/24  Last Fill Quantity: 360,   # refills: 1  Last Office Visit: 01/20/25  Future Office visit:    Next 5 appointments (look out 90 days)      Jul 21, 2025 1:30 PM  (Arrive by 1:15 PM)  Provider Visit with Natalia Starr MD  [facility] ([facility] ) [address]  [place] [address]  [phone]

## 2025-07-16 NOTE — PROGRESS NOTES
"  Assessment & Plan     1. Type 2 diabetes mellitus without complication, without long-term current use of insulin (H) (Primary)  Labs updated, medication refilled.  Consider lowering am metformin dose if HgbA1c still in range.  - Albumin Random Urine Quantitative with Creat Ratio; Future  - Hemoglobin A1c; Future  - metFORMIN (GLUCOPHAGE XR) 500 MG 24 hr tablet; Take 2 tablets (1,000 mg) by mouth 2 times daily (with meals).  Dispense: 360 tablet; Refill: 3  - Albumin Random Urine Quantitative with Creat Ratio  - Hemoglobin A1c    2. Mixed hyperlipidemia  As above, medication refilled  - ALT; Future  - Lipid Profile; Future  - rosuvastatin (CRESTOR) 5 MG tablet; Take 1 tablet (5 mg) by mouth daily.  Dispense: 90 tablet; Refill: 3  - ALT  - Lipid Profile    3. White coat syndrome with diagnosis of hypertension  Patient will message with normal BP readings from home later    4. Essential hypertension  As above, normal BP at home  - Basic metabolic panel; Future  - Basic metabolic panel    5. Hypothyroidism, unspecified type  Labs updated  - TSH with free T4 reflex; Future  - TSH with free T4 reflex    6. Primary hypertension  Refilled  - losartan (COZAAR) 25 MG tablet; Take 1 tablet (25 mg) by mouth daily.  Dispense: 90 tablet; Refill: 3        The longitudinal plan of care for the diagnosis(es)/condition(s) as documented were addressed during this visit. Due to the added complexity in care, I will continue to support Kayla in the subsequent management and with ongoing continuity of care.     BMI  Estimated body mass index is 30.42 kg/m  as calculated from the following:    Height as of this encounter: 1.651 m (5' 5\").    Weight as of this encounter: 82.9 kg (182 lb 12.8 oz).   Weight management plan: Discussed healthy diet and exercise guidelines    Follow-up  Return in about 6 months (around 1/21/2026) for Diabetic Follow-up, Chronic Disease Management, Medication review.    Irina Garza is a 75 year old, " presenting for the following health issues:  Hypertension, Diabetes, Lipids, and Depression        7/21/2025     1:03 PM   Additional Questions   Roomed by tabatha   Accompanied by none     HPI      Diabetes Follow-up    How often are you checking your blood sugar? A few times a week  What time of day are you checking your blood sugars (select all that apply)?  Before and after meals  Have you had any blood sugars above 200?  No  Have you had any blood sugars below 70?  Yes   What symptoms do you notice when your blood sugar is low?  Dizzy  What concerns do you have today about your diabetes? None   Do you have any of these symptoms? (Select all that apply)  No numbness or tingling in feet.  No redness, sores or blisters on feet.  No complaints of excessive thirst.  No reports of blurry vision.  No significant changes to weight.      BP Readings from Last 2 Encounters:   07/21/25 (!) 144/80   03/04/25 (!) 199/89     Hemoglobin A1C (%)   Date Value   01/20/2025 5.9 (H)   10/21/2024 5.9 (H)   06/29/2021 5.8 (H)   12/01/2020 5.8 (H)     LDL Cholesterol Calculated (mg/dL)   Date Value   01/20/2025 58   10/21/2024 116 (H)   06/29/2021 71   12/01/2020 71             Hyperlipidemia Follow-Up    Are you regularly taking any medication or supplement to lower your cholesterol?   Yes- Crestor 5 mg  Are you having muscle aches or other side effects that you think could be caused by your cholesterol lowering medication?  Yes- leg cramps lower leg     Hypertension Follow-up    Do you check your blood pressure regularly outside of the clinic? Yes   Are you following a low salt diet? Yes  Are your blood pressures ever more than 140 on the top number (systolic) OR more   than 90 on the bottom number (diastolic), for example 140/90? Yes one 131/71 137/79, 134/74, 145/74    BP Readings from Last 2 Encounters:   07/21/25 (!) 144/80   03/04/25 (!) 199/89     Depression   How are you doing with your depression since your last visit? Improved    Are you having other symptoms that might be associated with depression? No  Have you had a significant life event?  No   Are you feeling anxious or having panic attacks?   No  Do you have any concerns with your use of alcohol or other drugs? No    Social History     Tobacco Use    Smoking status: Never     Passive exposure: Never    Smokeless tobacco: Never   Vaping Use    Vaping status: Never Used   Substance Use Topics    Alcohol use: No     Alcohol/week: 0.0 standard drinks of alcohol    Drug use: No         6/29/2021     9:00 AM 1/10/2022     2:00 PM 7/21/2025    12:53 PM   PHQ   PHQ-9 Total Score 0 0 1    Q9: Thoughts of better off dead/self-harm past 2 weeks Not at all Not at all Not at all       Patient-reported         6/29/2021     9:00 AM 1/10/2022     2:00 PM 7/21/2025    12:55 PM   JOCELYNN-7 SCORE   Total Score   3 (minimal anxiety)   Total Score 2 5 3        Patient-reported         7/21/2025    12:53 PM   Last PHQ-9   1.  Little interest or pleasure in doing things 0   2.  Feeling down, depressed, or hopeless 0   3.  Trouble falling or staying asleep, or sleeping too much 1   4.  Feeling tired or having little energy 0   5.  Poor appetite or overeating 0   6.  Feeling bad about yourself 0   7.  Trouble concentrating 0   8.  Moving slowly or restless 0   Q9: Thoughts of better off dead/self-harm past 2 weeks 0   PHQ-9 Total Score 1        Patient-reported         7/21/2025    12:55 PM   JOCELYNN-7    1. Feeling nervous, anxious, or on edge 1   2. Not being able to stop or control worrying 0   3. Worrying too much about different things 1   4. Trouble relaxing 0   5. Being so restless that it is hard to sit still 0   6. Becoming easily annoyed or irritable 1   7. Feeling afraid, as if something awful might happen 0   JOCELYNN-7 Total Score 3    If you checked any problems, how difficult have they made it for you to do your work, take care of things at home, or get along with other people? Not difficult at all        "Patient-reported       Suicide Assessment Five-step Evaluation and Treatment (SAFE-T)        Review of Systems  Constitutional, HEENT, cardiovascular, pulmonary, gi and gu systems are negative, except as otherwise noted.      Objective    BP (!) 144/80 (BP Location: Right arm, Patient Position: Chair, Cuff Size: Adult Large)   Pulse 72   Temp 98.2  F (36.8  C) (Tympanic)   Resp 16   Ht 1.651 m (5' 5\")   Wt 82.9 kg (182 lb 12.8 oz)   SpO2 97%   BMI 30.42 kg/m    Body mass index is 30.42 kg/m .  Physical Exam   GENERAL: alert and no distress  PSYCH: mentation appears normal, affect normal/bright          Signed Electronically by: Natalia Starr MD    "

## 2025-07-21 ENCOUNTER — TELEPHONE (OUTPATIENT)
Dept: MAMMOGRAPHY | Facility: OTHER | Age: 75
End: 2025-07-21

## 2025-07-21 ENCOUNTER — OFFICE VISIT (OUTPATIENT)
Dept: FAMILY MEDICINE | Facility: OTHER | Age: 75
End: 2025-07-21
Attending: FAMILY MEDICINE
Payer: COMMERCIAL

## 2025-07-21 ENCOUNTER — ANCILLARY PROCEDURE (OUTPATIENT)
Dept: MAMMOGRAPHY | Facility: OTHER | Age: 75
End: 2025-07-21
Attending: FAMILY MEDICINE
Payer: COMMERCIAL

## 2025-07-21 ENCOUNTER — MYC MEDICAL ADVICE (OUTPATIENT)
Dept: FAMILY MEDICINE | Facility: OTHER | Age: 75
End: 2025-07-21

## 2025-07-21 VITALS
BODY MASS INDEX: 30.46 KG/M2 | SYSTOLIC BLOOD PRESSURE: 144 MMHG | HEIGHT: 65 IN | TEMPERATURE: 98.2 F | HEART RATE: 72 BPM | DIASTOLIC BLOOD PRESSURE: 80 MMHG | WEIGHT: 182.8 LBS | RESPIRATION RATE: 16 BRPM | OXYGEN SATURATION: 97 %

## 2025-07-21 DIAGNOSIS — I10 WHITE COAT SYNDROME WITH DIAGNOSIS OF HYPERTENSION: ICD-10-CM

## 2025-07-21 DIAGNOSIS — E03.9 HYPOTHYROIDISM, UNSPECIFIED TYPE: ICD-10-CM

## 2025-07-21 DIAGNOSIS — Z12.31 VISIT FOR SCREENING MAMMOGRAM: ICD-10-CM

## 2025-07-21 DIAGNOSIS — I10 ESSENTIAL HYPERTENSION: ICD-10-CM

## 2025-07-21 DIAGNOSIS — E78.2 MIXED HYPERLIPIDEMIA: ICD-10-CM

## 2025-07-21 DIAGNOSIS — I10 PRIMARY HYPERTENSION: ICD-10-CM

## 2025-07-21 DIAGNOSIS — E11.9 TYPE 2 DIABETES MELLITUS WITHOUT COMPLICATION, WITHOUT LONG-TERM CURRENT USE OF INSULIN (H): Primary | ICD-10-CM

## 2025-07-21 LAB
ALT SERPL W P-5'-P-CCNC: 18 U/L (ref 0–50)
ANION GAP SERPL CALCULATED.3IONS-SCNC: 11 MMOL/L (ref 7–15)
BUN SERPL-MCNC: 10.9 MG/DL (ref 8–23)
CALCIUM SERPL-MCNC: 10.1 MG/DL (ref 8.8–10.4)
CHLORIDE SERPL-SCNC: 105 MMOL/L (ref 98–107)
CHOLEST SERPL-MCNC: 106 MG/DL
CREAT SERPL-MCNC: 0.82 MG/DL (ref 0.51–0.95)
CREAT UR-MCNC: 105.6 MG/DL
EGFRCR SERPLBLD CKD-EPI 2021: 75 ML/MIN/1.73M2
EST. AVERAGE GLUCOSE BLD GHB EST-MCNC: 126 MG/DL
FASTING STATUS PATIENT QL REPORTED: YES
FASTING STATUS PATIENT QL REPORTED: YES
GLUCOSE SERPL-MCNC: 92 MG/DL (ref 70–99)
HBA1C MFR BLD: 6 %
HCO3 SERPL-SCNC: 26 MMOL/L (ref 22–29)
HDLC SERPL-MCNC: 37 MG/DL
LDLC SERPL CALC-MCNC: 50 MG/DL
MICROALBUMIN UR-MCNC: <12 MG/L
MICROALBUMIN/CREAT UR: NORMAL MG/G{CREAT}
NONHDLC SERPL-MCNC: 69 MG/DL
POTASSIUM SERPL-SCNC: 4.4 MMOL/L (ref 3.4–5.3)
SODIUM SERPL-SCNC: 142 MMOL/L (ref 135–145)
T4 FREE SERPL-MCNC: 1.65 NG/DL (ref 0.9–1.7)
TRIGL SERPL-MCNC: 97 MG/DL
TSH SERPL DL<=0.005 MIU/L-ACNC: 0.18 UIU/ML (ref 0.3–4.2)

## 2025-07-21 PROCEDURE — 82043 UR ALBUMIN QUANTITATIVE: CPT | Mod: ZL | Performed by: FAMILY MEDICINE

## 2025-07-21 PROCEDURE — 36415 COLL VENOUS BLD VENIPUNCTURE: CPT | Mod: ZL | Performed by: FAMILY MEDICINE

## 2025-07-21 PROCEDURE — 84439 ASSAY OF FREE THYROXINE: CPT | Mod: ZL | Performed by: FAMILY MEDICINE

## 2025-07-21 PROCEDURE — 77063 BREAST TOMOSYNTHESIS BI: CPT | Mod: 26 | Performed by: RADIOLOGY

## 2025-07-21 PROCEDURE — 82465 ASSAY BLD/SERUM CHOLESTEROL: CPT | Mod: ZL | Performed by: FAMILY MEDICINE

## 2025-07-21 PROCEDURE — 77067 SCR MAMMO BI INCL CAD: CPT | Mod: 26 | Performed by: RADIOLOGY

## 2025-07-21 PROCEDURE — 84460 ALANINE AMINO (ALT) (SGPT): CPT | Mod: ZL | Performed by: FAMILY MEDICINE

## 2025-07-21 PROCEDURE — 84443 ASSAY THYROID STIM HORMONE: CPT | Mod: ZL | Performed by: FAMILY MEDICINE

## 2025-07-21 PROCEDURE — G0463 HOSPITAL OUTPT CLINIC VISIT: HCPCS

## 2025-07-21 PROCEDURE — 80048 BASIC METABOLIC PNL TOTAL CA: CPT | Mod: ZL | Performed by: FAMILY MEDICINE

## 2025-07-21 PROCEDURE — 77067 SCR MAMMO BI INCL CAD: CPT | Mod: TC

## 2025-07-21 PROCEDURE — 83036 HEMOGLOBIN GLYCOSYLATED A1C: CPT | Mod: ZL | Performed by: FAMILY MEDICINE

## 2025-07-21 RX ORDER — ROSUVASTATIN CALCIUM 5 MG/1
5 TABLET, COATED ORAL DAILY
Qty: 90 TABLET | Refills: 3 | Status: SHIPPED | OUTPATIENT
Start: 2025-07-21

## 2025-07-21 RX ORDER — METFORMIN HYDROCHLORIDE 500 MG/1
1000 TABLET, EXTENDED RELEASE ORAL 2 TIMES DAILY WITH MEALS
Qty: 360 TABLET | Refills: 3 | Status: SHIPPED | OUTPATIENT
Start: 2025-07-21

## 2025-07-21 RX ORDER — LOSARTAN POTASSIUM 25 MG/1
25 TABLET ORAL DAILY
Qty: 90 TABLET | Refills: 3 | Status: SHIPPED | OUTPATIENT
Start: 2025-07-21

## 2025-07-21 ASSESSMENT — ANXIETY QUESTIONNAIRES
GAD7 TOTAL SCORE: 3
7. FEELING AFRAID AS IF SOMETHING AWFUL MIGHT HAPPEN: NOT AT ALL
6. BECOMING EASILY ANNOYED OR IRRITABLE: SEVERAL DAYS
GAD7 TOTAL SCORE: 3
2. NOT BEING ABLE TO STOP OR CONTROL WORRYING: NOT AT ALL
3. WORRYING TOO MUCH ABOUT DIFFERENT THINGS: SEVERAL DAYS
7. FEELING AFRAID AS IF SOMETHING AWFUL MIGHT HAPPEN: NOT AT ALL
8. IF YOU CHECKED OFF ANY PROBLEMS, HOW DIFFICULT HAVE THESE MADE IT FOR YOU TO DO YOUR WORK, TAKE CARE OF THINGS AT HOME, OR GET ALONG WITH OTHER PEOPLE?: NOT DIFFICULT AT ALL
1. FEELING NERVOUS, ANXIOUS, OR ON EDGE: SEVERAL DAYS
IF YOU CHECKED OFF ANY PROBLEMS ON THIS QUESTIONNAIRE, HOW DIFFICULT HAVE THESE PROBLEMS MADE IT FOR YOU TO DO YOUR WORK, TAKE CARE OF THINGS AT HOME, OR GET ALONG WITH OTHER PEOPLE: NOT DIFFICULT AT ALL
4. TROUBLE RELAXING: NOT AT ALL
5. BEING SO RESTLESS THAT IT IS HARD TO SIT STILL: NOT AT ALL
GAD7 TOTAL SCORE: 3

## 2025-07-21 ASSESSMENT — PATIENT HEALTH QUESTIONNAIRE - PHQ9
SUM OF ALL RESPONSES TO PHQ QUESTIONS 1-9: 1
SUM OF ALL RESPONSES TO PHQ QUESTIONS 1-9: 1
10. IF YOU CHECKED OFF ANY PROBLEMS, HOW DIFFICULT HAVE THESE PROBLEMS MADE IT FOR YOU TO DO YOUR WORK, TAKE CARE OF THINGS AT HOME, OR GET ALONG WITH OTHER PEOPLE: NOT DIFFICULT AT ALL

## 2025-07-21 ASSESSMENT — PAIN SCALES - GENERAL: PAINLEVEL_OUTOF10: NO PAIN (0)
